# Patient Record
Sex: FEMALE | Race: WHITE | NOT HISPANIC OR LATINO | Employment: OTHER | URBAN - METROPOLITAN AREA
[De-identification: names, ages, dates, MRNs, and addresses within clinical notes are randomized per-mention and may not be internally consistent; named-entity substitution may affect disease eponyms.]

---

## 2017-01-23 ENCOUNTER — HOSPITAL ENCOUNTER (OUTPATIENT)
Dept: FAMILY MEDICINE CLINIC | Facility: CLINIC | Age: 57
Setting detail: SPECIMEN
Discharge: HOME | End: 2017-01-23
Attending: PREVENTIVE MEDICINE | Admitting: PREVENTIVE MEDICINE

## 2017-01-23 LAB
ALBUMIN SERPL-MCNC: 3.9 G/DL (ref 3.5–4.8)
ALBUMIN/GLOB SERPL: 1.2 {RATIO} (ref 1–1.7)
ALP SERPL-CCNC: 109 IU/L (ref 32–91)
ALT SERPL-CCNC: 16 IU/L (ref 14–54)
ANION GAP SERPL CALC-SCNC: 12.4 MMOL/L (ref 10–20)
AST SERPL-CCNC: 21 IU/L (ref 15–41)
BASOPHILS # BLD AUTO: 0.1 10*3/UL (ref 0–0.2)
BASOPHILS NFR BLD AUTO: 1 % (ref 0–2)
BILIRUB SERPL-MCNC: 1 MG/DL (ref 0.3–1.2)
BUN SERPL-MCNC: 8 MG/DL (ref 8–20)
BUN/CREAT SERPL: 10 (ref 5.4–26.2)
CALCIUM SERPL-MCNC: 9 MG/DL (ref 8.9–10.3)
CHLORIDE SERPL-SCNC: 103 MMOL/L (ref 101–111)
CHOLEST SERPL-MCNC: 192 MG/DL
CHOLEST/HDLC SERPL: 4.9 {RATIO}
CONV CO2: 27 MMOL/L (ref 22–32)
CONV LDL CHOLESTEROL DIRECT: 127 MG/DL (ref 0–100)
CONV MICROALBUM.,U,RANDOM: 71 MG/L
CONV TOTAL PROTEIN: 7.1 G/DL (ref 6.1–7.9)
CREAT 24H UR-MCNC: 103.4 MG/DL
CREAT UR-MCNC: 0.8 MG/DL (ref 0.4–1)
CRP SERPL-MCNC: 0.77 MG/DL (ref 0–0.7)
DIFFERENTIAL METHOD BLD: (no result)
EOSINOPHIL # BLD AUTO: 0.2 10*3/UL (ref 0–0.3)
EOSINOPHIL # BLD AUTO: 3 % (ref 0–3)
ERYTHROCYTE [DISTWIDTH] IN BLOOD BY AUTOMATED COUNT: 14.1 % (ref 11.5–14.5)
ERYTHROCYTE [SEDIMENTATION RATE] IN BLOOD BY WESTERGREN METHOD: 17 MM/HR (ref 0–30)
GLOBULIN UR ELPH-MCNC: 3.2 G/DL (ref 2.5–3.8)
GLUCOSE SERPL-MCNC: 122 MG/DL (ref 65–99)
HCT VFR BLD AUTO: 42.8 % (ref 35–49)
HDLC SERPL-MCNC: 39 MG/DL
HGB BLD-MCNC: 14.4 G/DL (ref 12–15)
LDLC/HDLC SERPL: 3.3 {RATIO}
LIPID INTERPRETATION: ABNORMAL
LYMPHOCYTES # BLD AUTO: 1.7 10*3/UL (ref 0.8–4.8)
LYMPHOCYTES NFR BLD AUTO: 23 % (ref 18–42)
MCH RBC QN AUTO: 27.4 PG (ref 26–32)
MCHC RBC AUTO-ENTMCNC: 33.6 G/DL (ref 32–36)
MCV RBC AUTO: 81.4 FL (ref 80–94)
MICROALBUMIN/CREAT UR: 68.7 UG/MG
MONOCYTES # BLD AUTO: 0.5 10*3/UL (ref 0.1–1.3)
MONOCYTES NFR BLD AUTO: 7 % (ref 2–11)
NEUTROPHILS # BLD AUTO: 4.8 10*3/UL (ref 2.3–8.6)
NEUTROPHILS NFR BLD AUTO: 66 % (ref 50–75)
NRBC BLD AUTO-RTO: 0 /100{WBCS}
NRBC/RBC NFR BLD MANUAL: 0 10*3/UL
PLATELET # BLD AUTO: 252 10*3/UL (ref 150–450)
PMV BLD AUTO: 9.5 FL (ref 7.4–10.4)
POTASSIUM SERPL-SCNC: 3.4 MMOL/L (ref 3.6–5.1)
RBC # BLD AUTO: 5.26 10*6/UL (ref 4–5.4)
SODIUM SERPL-SCNC: 139 MMOL/L (ref 136–144)
TRIGL SERPL-MCNC: 179 MG/DL
TSH SERPL-ACNC: 1.34 UIU/ML (ref 0.34–5.6)
VIT B12 SERPL-MCNC: 359 PG/ML (ref 180–914)
VLDLC SERPL CALC-MCNC: 25.9 MG/DL
WBC # BLD AUTO: 7.3 10*3/UL (ref 4.5–11.5)

## 2017-01-25 LAB
HAV IGM SERPL QL IA: NONREACTIVE
HBV CORE IGM SERPL QL IA: NONREACTIVE
HBV SURFACE AG SERPL QL IA: NONREACTIVE
HCV AB SER DONR QL: NORMAL
HCV AB SER DONR QL: NORMAL
HIV1+2 AB SERPL QL IA: NORMAL

## 2017-02-20 ENCOUNTER — HOSPITAL ENCOUNTER (OUTPATIENT)
Dept: PAIN MEDICINE | Facility: HOSPITAL | Age: 57
Discharge: HOME | End: 2017-02-20
Attending: PHYSICAL MEDICINE & REHABILITATION | Admitting: PHYSICAL MEDICINE & REHABILITATION

## 2017-02-20 LAB
AMPHETAMINES UR QL SCN: NEGATIVE
BZE UR QL SCN: NEGATIVE
CREAT 24H UR-MCNC: NORMAL MG/DL
METHADONE UR QL SCN: NEGATIVE
OPIATE CONFIRMATION URINE: NORMAL
THC SERPLBLD CFM-MCNC: NEGATIVE NG/ML

## 2017-03-13 ENCOUNTER — HOSPITAL ENCOUNTER (OUTPATIENT)
Dept: MRI IMAGING | Facility: HOSPITAL | Age: 57
Discharge: HOME | End: 2017-03-13
Attending: PREVENTIVE MEDICINE | Admitting: PREVENTIVE MEDICINE

## 2017-03-20 ENCOUNTER — HOSPITAL ENCOUNTER (OUTPATIENT)
Dept: PAIN MEDICINE | Facility: HOSPITAL | Age: 57
Discharge: HOME | End: 2017-03-20
Attending: PHYSICAL MEDICINE & REHABILITATION | Admitting: PHYSICAL MEDICINE & REHABILITATION

## 2017-04-03 ENCOUNTER — HOSPITAL ENCOUNTER (OUTPATIENT)
Dept: PAIN MEDICINE | Facility: HOSPITAL | Age: 57
Discharge: HOME | End: 2017-04-03
Attending: PHYSICAL MEDICINE & REHABILITATION | Admitting: PHYSICAL MEDICINE & REHABILITATION

## 2017-04-13 ENCOUNTER — HOSPITAL ENCOUNTER (OUTPATIENT)
Dept: ORTHOPEDIC SURGERY | Facility: CLINIC | Age: 57
Discharge: HOME | End: 2017-04-13
Attending: ORTHOPAEDIC SURGERY | Admitting: ORTHOPAEDIC SURGERY

## 2017-04-17 ENCOUNTER — HOSPITAL ENCOUNTER (OUTPATIENT)
Dept: PAIN MEDICINE | Facility: HOSPITAL | Age: 57
Discharge: HOME | End: 2017-04-17
Attending: PHYSICAL MEDICINE & REHABILITATION | Admitting: PHYSICAL MEDICINE & REHABILITATION

## 2017-04-25 ENCOUNTER — HOSPITAL ENCOUNTER (OUTPATIENT)
Dept: FAMILY MEDICINE CLINIC | Facility: CLINIC | Age: 57
Setting detail: SPECIMEN
Discharge: HOME | End: 2017-04-25
Attending: PREVENTIVE MEDICINE | Admitting: PREVENTIVE MEDICINE

## 2017-04-25 LAB
CASTS URNS QL MICRO: ABNORMAL /[LPF]
CONV BACTERIA IN URINE MICRO: NEGATIVE
CONV HYALINE CASTS IN URINE MICRO: 0 /[LPF] (ref 0–5)
CONV MICROALBUM.,U,RANDOM: 15 MG/L
CONV SMALL ROUND CELLS: ABNORMAL /[HPF]
CREAT 24H UR-MCNC: 64.2 MG/DL
MICROALBUMIN/CREAT UR: 23.4 UG/MG
RBC #/AREA URNS HPF: 4 /[HPF] (ref 0–3)
SPERM URNS QL MICRO: ABNORMAL /[HPF]
SQUAMOUS SPT QL MICRO: 2 /[HPF] (ref 0–5)
UNIDENT CRYS URNS QL MICRO: ABNORMAL /[HPF]
WBC #/AREA URNS HPF: 8 /[HPF] (ref 0–5)
YEAST SPEC QL WET PREP: ABNORMAL /[HPF]

## 2017-04-26 LAB
BACTERIA SPEC AEROBE CULT: NORMAL
Lab: NORMAL
MICRO REPORT STATUS: NORMAL
SPECIMEN SOURCE: NORMAL

## 2017-04-28 ENCOUNTER — HOSPITAL ENCOUNTER (OUTPATIENT)
Dept: FAMILY MEDICINE CLINIC | Facility: CLINIC | Age: 57
Setting detail: SPECIMEN
Discharge: HOME | End: 2017-04-28
Attending: PREVENTIVE MEDICINE | Admitting: PREVENTIVE MEDICINE

## 2017-04-28 LAB
ALBUMIN SERPL-MCNC: 3.7 G/DL (ref 3.5–4.8)
ALBUMIN/GLOB SERPL: 1.1 {RATIO} (ref 1–1.7)
ALP SERPL-CCNC: 119 IU/L (ref 32–91)
ALT SERPL-CCNC: 17 IU/L (ref 14–54)
ANION GAP SERPL CALC-SCNC: 13.8 MMOL/L (ref 10–20)
AST SERPL-CCNC: 16 IU/L (ref 15–41)
BILIRUB SERPL-MCNC: 0.9 MG/DL (ref 0.3–1.2)
BUN SERPL-MCNC: 11 MG/DL (ref 8–20)
BUN/CREAT SERPL: 13.8 (ref 5.4–26.2)
CALCIUM SERPL-MCNC: 9.2 MG/DL (ref 8.9–10.3)
CHLORIDE SERPL-SCNC: 103 MMOL/L (ref 101–111)
CHOLEST SERPL-MCNC: 200 MG/DL
CHOLEST/HDLC SERPL: 4.3 {RATIO}
CONV CO2: 28 MMOL/L (ref 22–32)
CONV LDL CHOLESTEROL DIRECT: 135 MG/DL (ref 0–100)
CONV TOTAL PROTEIN: 7.2 G/DL (ref 6.1–7.9)
CREAT UR-MCNC: 0.8 MG/DL (ref 0.4–1)
CRP SERPL-MCNC: 0.84 MG/DL (ref 0–0.7)
ERYTHROCYTE [SEDIMENTATION RATE] IN BLOOD BY WESTERGREN METHOD: 14 MM/HR (ref 0–30)
GLOBULIN UR ELPH-MCNC: 3.5 G/DL (ref 2.5–3.8)
GLUCOSE SERPL-MCNC: 113 MG/DL (ref 65–99)
HDLC SERPL-MCNC: 47 MG/DL
LDLC/HDLC SERPL: 2.9 {RATIO}
LIPID INTERPRETATION: ABNORMAL
POTASSIUM SERPL-SCNC: 3.8 MMOL/L (ref 3.6–5.1)
SODIUM SERPL-SCNC: 141 MMOL/L (ref 136–144)
TRIGL SERPL-MCNC: 108 MG/DL
VIT B12 SERPL-MCNC: 359 PG/ML (ref 180–914)
VLDLC SERPL CALC-MCNC: 17.9 MG/DL

## 2017-04-29 LAB
BACTERIA SPEC AEROBE CULT: NORMAL
Lab: NORMAL
MICRO REPORT STATUS: NORMAL
SPECIMEN SOURCE: NORMAL

## 2017-05-01 ENCOUNTER — HOSPITAL ENCOUNTER (OUTPATIENT)
Dept: PAIN MEDICINE | Facility: HOSPITAL | Age: 57
Discharge: HOME | End: 2017-05-01
Attending: PHYSICAL MEDICINE & REHABILITATION | Admitting: PHYSICAL MEDICINE & REHABILITATION

## 2017-05-15 ENCOUNTER — HOSPITAL ENCOUNTER (OUTPATIENT)
Dept: PAIN MEDICINE | Facility: HOSPITAL | Age: 57
Discharge: HOME | End: 2017-05-15
Attending: PHYSICAL MEDICINE & REHABILITATION | Admitting: PHYSICAL MEDICINE & REHABILITATION

## 2017-06-13 ENCOUNTER — HOSPITAL ENCOUNTER (OUTPATIENT)
Dept: FAMILY MEDICINE CLINIC | Facility: CLINIC | Age: 57
Setting detail: SPECIMEN
Discharge: HOME | End: 2017-06-13
Attending: PREVENTIVE MEDICINE | Admitting: PREVENTIVE MEDICINE

## 2017-07-10 ENCOUNTER — HOSPITAL ENCOUNTER (OUTPATIENT)
Dept: PAIN MEDICINE | Facility: HOSPITAL | Age: 57
Discharge: HOME | End: 2017-07-10
Attending: PHYSICAL MEDICINE & REHABILITATION | Admitting: PHYSICAL MEDICINE & REHABILITATION

## 2017-07-25 ENCOUNTER — ON CAMPUS - OUTPATIENT (AMBULATORY)
Dept: URBAN - METROPOLITAN AREA HOSPITAL 2 | Facility: HOSPITAL | Age: 57
End: 2017-07-25

## 2017-07-25 ENCOUNTER — HOSPITAL ENCOUNTER (OUTPATIENT)
Dept: OTHER | Facility: HOSPITAL | Age: 57
Setting detail: SPECIMEN
Discharge: HOME | End: 2017-07-25
Attending: INTERNAL MEDICINE | Admitting: INTERNAL MEDICINE

## 2017-07-25 ENCOUNTER — OFFICE (AMBULATORY)
Dept: URBAN - METROPOLITAN AREA CLINIC 64 | Facility: CLINIC | Age: 57
End: 2017-07-25

## 2017-07-25 VITALS
HEART RATE: 72 BPM | WEIGHT: 250 LBS | SYSTOLIC BLOOD PRESSURE: 101 MMHG | SYSTOLIC BLOOD PRESSURE: 111 MMHG | TEMPERATURE: 98.4 F | DIASTOLIC BLOOD PRESSURE: 69 MMHG | SYSTOLIC BLOOD PRESSURE: 114 MMHG | DIASTOLIC BLOOD PRESSURE: 58 MMHG | DIASTOLIC BLOOD PRESSURE: 55 MMHG | OXYGEN SATURATION: 92 % | SYSTOLIC BLOOD PRESSURE: 107 MMHG | HEART RATE: 66 BPM | DIASTOLIC BLOOD PRESSURE: 76 MMHG | HEART RATE: 68 BPM | SYSTOLIC BLOOD PRESSURE: 88 MMHG | HEART RATE: 90 BPM | HEIGHT: 66 IN | OXYGEN SATURATION: 94 % | DIASTOLIC BLOOD PRESSURE: 92 MMHG | DIASTOLIC BLOOD PRESSURE: 89 MMHG | RESPIRATION RATE: 18 BRPM | SYSTOLIC BLOOD PRESSURE: 207 MMHG | DIASTOLIC BLOOD PRESSURE: 75 MMHG | DIASTOLIC BLOOD PRESSURE: 61 MMHG | SYSTOLIC BLOOD PRESSURE: 158 MMHG | OXYGEN SATURATION: 95 % | OXYGEN SATURATION: 96 % | HEART RATE: 61 BPM | OXYGEN SATURATION: 93 % | SYSTOLIC BLOOD PRESSURE: 122 MMHG | DIASTOLIC BLOOD PRESSURE: 97 MMHG | OXYGEN SATURATION: 91 % | HEART RATE: 71 BPM | HEART RATE: 58 BPM | HEART RATE: 64 BPM | HEART RATE: 63 BPM | SYSTOLIC BLOOD PRESSURE: 146 MMHG | RESPIRATION RATE: 16 BRPM

## 2017-07-25 DIAGNOSIS — K57.30 DIVERTICULOSIS OF LARGE INTESTINE WITHOUT PERFORATION OR ABS: ICD-10-CM

## 2017-07-25 DIAGNOSIS — K62.1 RECTAL POLYP: ICD-10-CM

## 2017-07-25 DIAGNOSIS — D12.5 BENIGN NEOPLASM OF SIGMOID COLON: ICD-10-CM

## 2017-07-25 DIAGNOSIS — Z86.010 PERSONAL HISTORY OF COLONIC POLYPS: ICD-10-CM

## 2017-07-25 DIAGNOSIS — D12.3 BENIGN NEOPLASM OF TRANSVERSE COLON: ICD-10-CM

## 2017-07-25 DIAGNOSIS — K63.5 POLYP OF COLON: ICD-10-CM

## 2017-07-25 PROBLEM — Z80.0 FAMILY HISTORY OF COLON CANCER: Status: ACTIVE | Noted: 2017-07-25

## 2017-07-25 PROBLEM — Z12.11 SCREENING FOR MALIGNANT NEOPLASMS OF COLON: Status: ACTIVE | Noted: 2017-07-25

## 2017-07-25 LAB
GI HISTOLOGY: A. UNSPECIFIED: (no result)
GI HISTOLOGY: B. UNSPECIFIED: (no result)
GI HISTOLOGY: C. UNSPECIFIED: (no result)
GI HISTOLOGY: PDF REPORT: (no result)

## 2017-07-25 PROCEDURE — 45385 COLONOSCOPY W/LESION REMOVAL: CPT | Mod: PT | Performed by: INTERNAL MEDICINE

## 2017-07-25 PROCEDURE — 88305 TISSUE EXAM BY PATHOLOGIST: CPT | Mod: 26 | Performed by: INTERNAL MEDICINE

## 2017-07-25 RX ADMIN — PROPOFOL: 10 INJECTION, EMULSION INTRAVENOUS at 10:32

## 2017-09-05 ENCOUNTER — HOSPITAL ENCOUNTER (OUTPATIENT)
Dept: PAIN MEDICINE | Facility: HOSPITAL | Age: 57
Discharge: HOME | End: 2017-09-05
Attending: PHYSICAL MEDICINE & REHABILITATION | Admitting: PHYSICAL MEDICINE & REHABILITATION

## 2017-10-31 ENCOUNTER — HOSPITAL ENCOUNTER (OUTPATIENT)
Dept: PAIN MEDICINE | Facility: HOSPITAL | Age: 57
Discharge: HOME | End: 2017-10-31
Attending: PHYSICAL MEDICINE & REHABILITATION | Admitting: PHYSICAL MEDICINE & REHABILITATION

## 2017-11-16 ENCOUNTER — HOSPITAL ENCOUNTER (OUTPATIENT)
Dept: PAIN MEDICINE | Facility: HOSPITAL | Age: 57
Discharge: HOME | End: 2017-11-16
Attending: PHYSICAL MEDICINE & REHABILITATION | Admitting: PHYSICAL MEDICINE & REHABILITATION

## 2017-11-28 ENCOUNTER — HOSPITAL ENCOUNTER (OUTPATIENT)
Dept: PAIN MEDICINE | Facility: HOSPITAL | Age: 57
Discharge: HOME | End: 2017-11-28
Attending: PHYSICAL MEDICINE & REHABILITATION | Admitting: PHYSICAL MEDICINE & REHABILITATION

## 2017-12-19 ENCOUNTER — HOSPITAL ENCOUNTER (OUTPATIENT)
Dept: PAIN MEDICINE | Facility: HOSPITAL | Age: 57
Discharge: HOME | End: 2017-12-19
Attending: PHYSICAL MEDICINE & REHABILITATION | Admitting: PHYSICAL MEDICINE & REHABILITATION

## 2018-02-06 ENCOUNTER — HOSPITAL ENCOUNTER (OUTPATIENT)
Dept: PAIN MEDICINE | Facility: HOSPITAL | Age: 58
Discharge: HOME | End: 2018-02-06
Attending: PHYSICAL MEDICINE & REHABILITATION | Admitting: PHYSICAL MEDICINE & REHABILITATION

## 2018-02-08 ENCOUNTER — HOSPITAL ENCOUNTER (OUTPATIENT)
Dept: FAMILY MEDICINE CLINIC | Facility: CLINIC | Age: 58
Setting detail: SPECIMEN
Discharge: HOME | End: 2018-02-08
Attending: PREVENTIVE MEDICINE | Admitting: PREVENTIVE MEDICINE

## 2018-02-08 LAB
ALBUMIN SERPL-MCNC: 3.9 G/DL (ref 3.5–4.8)
ALBUMIN/GLOB SERPL: 1.1 {RATIO} (ref 1–1.7)
ALP SERPL-CCNC: 102 IU/L (ref 32–91)
ALT SERPL-CCNC: 14 IU/L (ref 14–54)
ANION GAP SERPL CALC-SCNC: 11.2 MMOL/L (ref 10–20)
AST SERPL-CCNC: 16 IU/L (ref 15–41)
BASOPHILS # BLD AUTO: 0 10*3/UL (ref 0–0.2)
BASOPHILS NFR BLD AUTO: 1 % (ref 0–2)
BILIRUB SERPL-MCNC: 1 MG/DL (ref 0.3–1.2)
BUN SERPL-MCNC: 8 MG/DL (ref 8–20)
BUN/CREAT SERPL: 11.4 (ref 5.4–26.2)
CALCIUM SERPL-MCNC: 9.3 MG/DL (ref 8.9–10.3)
CHLORIDE SERPL-SCNC: 104 MMOL/L (ref 101–111)
CHOLEST SERPL-MCNC: 192 MG/DL
CHOLEST/HDLC SERPL: 5.1 {RATIO}
CONV CO2: 27 MMOL/L (ref 22–32)
CONV LDL CHOLESTEROL DIRECT: 129 MG/DL (ref 0–100)
CONV MICROALBUM.,U,RANDOM: 22 MG/L
CONV TOTAL PROTEIN: 7.3 G/DL (ref 6.1–7.9)
CREAT 24H UR-MCNC: 126.9 MG/DL
CREAT UR-MCNC: 0.7 MG/DL (ref 0.4–1)
DIFFERENTIAL METHOD BLD: (no result)
EOSINOPHIL # BLD AUTO: 0.2 10*3/UL (ref 0–0.3)
EOSINOPHIL # BLD AUTO: 2 % (ref 0–3)
ERYTHROCYTE [DISTWIDTH] IN BLOOD BY AUTOMATED COUNT: 14.2 % (ref 11.5–14.5)
GLOBULIN UR ELPH-MCNC: 3.4 G/DL (ref 2.5–3.8)
GLUCOSE SERPL-MCNC: 106 MG/DL (ref 65–99)
HCT VFR BLD AUTO: 42.5 % (ref 35–49)
HDLC SERPL-MCNC: 38 MG/DL
HGB BLD-MCNC: 14.5 G/DL (ref 12–15)
LDLC/HDLC SERPL: 3.4 {RATIO}
LIPID INTERPRETATION: ABNORMAL
LYMPHOCYTES # BLD AUTO: 1.8 10*3/UL (ref 0.8–4.8)
LYMPHOCYTES NFR BLD AUTO: 19 % (ref 18–42)
MCH RBC QN AUTO: 28.7 PG (ref 26–32)
MCHC RBC AUTO-ENTMCNC: 34 G/DL (ref 32–36)
MCV RBC AUTO: 84.5 FL (ref 80–94)
MICROALBUMIN/CREAT UR: 17.3 UG/MG
MONOCYTES # BLD AUTO: 0.6 10*3/UL (ref 0.1–1.3)
MONOCYTES NFR BLD AUTO: 6 % (ref 2–11)
NEUTROPHILS # BLD AUTO: 6.9 10*3/UL (ref 2.3–8.6)
NEUTROPHILS NFR BLD AUTO: 72 % (ref 50–75)
NRBC BLD AUTO-RTO: 0 /100{WBCS}
NRBC/RBC NFR BLD MANUAL: 0 10*3/UL
PLATELET # BLD AUTO: 285 10*3/UL (ref 150–450)
PMV BLD AUTO: 9.6 FL (ref 7.4–10.4)
POTASSIUM SERPL-SCNC: 4.2 MMOL/L (ref 3.6–5.1)
RBC # BLD AUTO: 5.03 10*6/UL (ref 4–5.4)
SODIUM SERPL-SCNC: 138 MMOL/L (ref 136–144)
TRIGL SERPL-MCNC: 139 MG/DL
VLDLC SERPL CALC-MCNC: 25.1 MG/DL
WBC # BLD AUTO: 9.4 10*3/UL (ref 4.5–11.5)

## 2018-03-13 ENCOUNTER — HOSPITAL ENCOUNTER (OUTPATIENT)
Dept: FAMILY MEDICINE CLINIC | Facility: CLINIC | Age: 58
Setting detail: SPECIMEN
Discharge: HOME | End: 2018-03-13
Attending: PREVENTIVE MEDICINE | Admitting: PREVENTIVE MEDICINE

## 2018-04-24 ENCOUNTER — HOSPITAL ENCOUNTER (OUTPATIENT)
Dept: PAIN MEDICINE | Facility: HOSPITAL | Age: 58
Discharge: HOME | End: 2018-04-24
Attending: PHYSICAL MEDICINE & REHABILITATION | Admitting: PHYSICAL MEDICINE & REHABILITATION

## 2018-05-08 ENCOUNTER — HOSPITAL ENCOUNTER (OUTPATIENT)
Dept: ORTHOPEDIC SURGERY | Facility: CLINIC | Age: 58
Discharge: HOME | End: 2018-05-08
Attending: PHYSICIAN ASSISTANT | Admitting: PHYSICIAN ASSISTANT

## 2022-03-17 ENCOUNTER — OFFICE VISIT (OUTPATIENT)
Dept: ORTHOPEDIC SURGERY | Facility: CLINIC | Age: 62
End: 2022-03-17

## 2022-03-17 VITALS
WEIGHT: 253.4 LBS | BODY MASS INDEX: 40.72 KG/M2 | SYSTOLIC BLOOD PRESSURE: 138 MMHG | HEART RATE: 98 BPM | DIASTOLIC BLOOD PRESSURE: 82 MMHG | HEIGHT: 66 IN

## 2022-03-17 DIAGNOSIS — Z96.652 HISTORY OF TOTAL KNEE REPLACEMENT, LEFT: Primary | ICD-10-CM

## 2022-03-17 DIAGNOSIS — E66.01 MORBID OBESITY: ICD-10-CM

## 2022-03-17 PROBLEM — E11.65 TYPE 2 DIABETES MELLITUS WITH HYPERGLYCEMIA (HCC): Status: ACTIVE | Noted: 2017-01-23

## 2022-03-17 PROBLEM — Z96.651 PRESENCE OF RIGHT ARTIFICIAL KNEE JOINT: Status: ACTIVE | Noted: 2018-04-19

## 2022-03-17 PROBLEM — M79.7 FIBROMYALGIA: Status: ACTIVE | Noted: 2017-01-23

## 2022-03-17 PROBLEM — G89.29 CHRONIC LOW BACK PAIN: Status: ACTIVE | Noted: 2017-01-23

## 2022-03-17 PROBLEM — E55.9 VITAMIN D DEFICIENCY: Status: ACTIVE | Noted: 2017-01-23

## 2022-03-17 PROBLEM — R74.8 HIGH ALKALINE PHOSPHATASE: Status: ACTIVE | Noted: 2017-01-23

## 2022-03-17 PROBLEM — F41.1 ANXIETY STATE: Status: ACTIVE | Noted: 2017-01-23

## 2022-03-17 PROBLEM — E87.6 HYPOKALEMIA: Status: ACTIVE | Noted: 2017-01-23

## 2022-03-17 PROBLEM — M47.816 LUMBAR SPONDYLOSIS: Status: ACTIVE | Noted: 2020-02-26

## 2022-03-17 PROBLEM — M54.16 LUMBAR RADICULOPATHY: Status: ACTIVE | Noted: 2017-02-20

## 2022-03-17 PROBLEM — M51.36 DEGENERATION OF INTERVERTEBRAL DISC OF LUMBAR REGION: Status: ACTIVE | Noted: 2017-02-20

## 2022-03-17 PROBLEM — E66.9 OBESITY WITH BODY MASS INDEX 30 OR GREATER: Status: ACTIVE | Noted: 2017-01-23

## 2022-03-17 PROBLEM — A49.02 MRSA INFECTION: Status: ACTIVE | Noted: 2017-01-23

## 2022-03-17 PROBLEM — Z80.0 FAMILY HISTORY OF MALIGNANT NEOPLASM OF COLON: Status: ACTIVE | Noted: 2017-01-23

## 2022-03-17 PROBLEM — I10 HYPERTENSION: Status: ACTIVE | Noted: 2017-04-28

## 2022-03-17 PROBLEM — K13.21 LEUKOPLAKIA OF ORAL MUCOSA: Status: ACTIVE | Noted: 2017-01-23

## 2022-03-17 PROBLEM — M51.369 DEGENERATION OF INTERVERTEBRAL DISC OF LUMBAR REGION: Status: ACTIVE | Noted: 2017-02-20

## 2022-03-17 PROBLEM — H72.90 TYMPANIC MEMBRANE PERFORATION: Status: ACTIVE | Noted: 2018-03-13

## 2022-03-17 PROBLEM — M54.50 CHRONIC LOW BACK PAIN: Status: ACTIVE | Noted: 2017-01-23

## 2022-03-17 PROCEDURE — 99204 OFFICE O/P NEW MOD 45 MIN: CPT | Performed by: PHYSICIAN ASSISTANT

## 2022-03-17 RX ORDER — CYCLOSPORINE 0.5 MG/ML
1 EMULSION OPHTHALMIC EVERY 12 HOURS
COMMUNITY

## 2022-03-17 RX ORDER — SACCHAROMYCES BOULARDII 250 MG
250 CAPSULE ORAL 2 TIMES DAILY
COMMUNITY
End: 2022-05-09

## 2022-03-17 RX ORDER — DIPHENHYDRAMINE HYDROCHLORIDE 25 MG/1
CAPSULE, LIQUID FILLED ORAL
Status: ON HOLD | COMMUNITY
Start: 2017-01-23 | End: 2022-05-24

## 2022-03-17 RX ORDER — LOSARTAN POTASSIUM 100 MG/1
100 TABLET ORAL
COMMUNITY
Start: 2017-10-26

## 2022-03-17 RX ORDER — ATORVASTATIN CALCIUM 20 MG/1
20 TABLET, FILM COATED ORAL NIGHTLY
COMMUNITY
Start: 2022-02-15

## 2022-03-17 RX ORDER — GABAPENTIN 600 MG/1
600 TABLET ORAL 3 TIMES DAILY
COMMUNITY
Start: 2022-02-15

## 2022-03-17 RX ORDER — BUPROPION HYDROCHLORIDE 150 MG/1
1 TABLET ORAL EVERY MORNING
COMMUNITY
Start: 2022-02-15

## 2022-03-17 RX ORDER — MELOXICAM 15 MG/1
15 TABLET ORAL DAILY
Qty: 30 TABLET | Refills: 0 | Status: SHIPPED | OUTPATIENT
Start: 2022-03-17 | End: 2022-04-13

## 2022-03-17 RX ORDER — DAPAGLIFLOZIN AND METFORMIN HYDROCHLORIDE 5; 1000 MG/1; MG/1
TABLET, FILM COATED, EXTENDED RELEASE ORAL
COMMUNITY
End: 2022-05-09

## 2022-03-17 RX ORDER — ALBUTEROL SULFATE 90 UG/1
2 AEROSOL, METERED RESPIRATORY (INHALATION) EVERY 6 HOURS PRN
COMMUNITY

## 2022-03-17 RX ORDER — FAMOTIDINE 20 MG/1
TABLET, FILM COATED ORAL
COMMUNITY
End: 2022-05-09

## 2022-03-17 RX ORDER — FLUOXETINE HYDROCHLORIDE 60 MG/1
TABLET, FILM COATED ORAL; ORAL EVERY 24 HOURS
Status: ON HOLD | COMMUNITY
Start: 2017-11-13 | End: 2022-05-24

## 2022-03-17 RX ORDER — MULTIPLE VITAMINS W/ MINERALS TAB 9MG-400MCG
1 TAB ORAL DAILY
COMMUNITY

## 2022-03-17 RX ORDER — FLUTICASONE PROPIONATE 50 MCG
2 SPRAY, SUSPENSION (ML) NASAL DAILY
COMMUNITY

## 2022-03-17 RX ORDER — VILAZODONE HYDROCHLORIDE 40 MG/1
40 TABLET ORAL
COMMUNITY
Start: 2022-02-15

## 2022-03-17 RX ORDER — CHOLECALCIFEROL (VITAMIN D3) 25 MCG
1000 TABLET,CHEWABLE ORAL DAILY
COMMUNITY
Start: 2017-04-25

## 2022-03-17 RX ORDER — DULAGLUTIDE 1.5 MG/.5ML
1.5 INJECTION, SOLUTION SUBCUTANEOUS WEEKLY
COMMUNITY

## 2022-03-17 RX ORDER — HYDROXYZINE HYDROCHLORIDE 25 MG/1
25 TABLET, FILM COATED ORAL EVERY 8 HOURS PRN
COMMUNITY

## 2022-03-17 NOTE — PROGRESS NOTES
ORTHOPEDIC VISIT    Referring Provider: Referring  Primary Care Provider: Chaya Dorsey MD         Subjective:  Chief Complaint:  Chief Complaint   Patient presents with   • Left Knee - Initial Evaluation     S/p Lt TKA 2014 in [place], increased issues per patient. Was walking x 1-2 months ago felt like knee went out, feels a lot of pressure.       HPI:  Monica Fitzpatrick is a 61 y.o. female who presents for her initial visit for left knee pain ongoing for the last 2 months.  She denies any specific injury.  She had a left total knee replacement in 2014 in [place].  She describes a sharp, shooting pain in the general knee area.  She also reports radiation up and down the leg, but denies any numbness or tingling.  Her pain does increase significantly with weightbearing, and she also reports instability.  She has not been able to try anti-inflammatories due to stomach issues.  Taping by physical therapy has helped a little.  She denies any significant issues with the knee until now.  She denies any history of infections in the knee.    Past Medical History:   Diagnosis Date   • Anxiety and depression    • Diabetes (HCC)    • Dry eye    • GERD (gastroesophageal reflux disease)    • Hyperlipidemia    • Hypertension        Past Surgical History:   Procedure Laterality Date   • REPLACEMENT TOTAL KNEE Left 2014    [place]   • TOTAL KNEE ARTHROPLASTY Right    • WRIST SURGERY Bilateral     plate and screws       Family History   Problem Relation Age of Onset   • Diabetes Mother    • Diabetes Father    • Alcohol abuse Father    • Drug abuse Sister    • Alcohol abuse Brother    • Diabetes Maternal Grandmother        Social History     Occupational History   • Not on file   Tobacco Use   • Smoking status: Never Smoker   • Smokeless tobacco: Never Used   Vaping Use   • Vaping Use: Never used   Substance and Sexual Activity   • Alcohol use: Never   • Drug use: Never   • Sexual activity: Defer         Medications:    Current Outpatient Medications:   •  albuterol sulfate  (90 Base) MCG/ACT inhaler, Ventolin HFA 90 mcg/actuation aerosol inhaler  INHALE 2 PUFFS BY MOUTH FOUR TIMES DAILY, Disp: , Rfl:   •  atorvastatin (LIPITOR) 20 MG tablet, Take 20 mg by mouth Daily., Disp: , Rfl:   •  Blood Glucose Monitoring Suppl (Blood Glucose Monitor System) w/Device kit, BLOOD GLUCOSE MONITOR SYSTEM w/Device KIT, Disp: , Rfl:   •  buPROPion XL (WELLBUTRIN XL) 150 MG 24 hr tablet, Take 1 tablet by mouth Daily., Disp: , Rfl:   •  Cyanocobalamin (B-12) 1000 MCG capsule, B-12 1000 MCG CAPS, Disp: , Rfl:   •  cycloSPORINE (RESTASIS) 0.05 % ophthalmic emulsion, Restasis 0.05 % eye drops in a dropperette, Disp: , Rfl:   •  dapagliflozin-metformin HCl ER (Xigduo XR) 5-1000 MG tablet, Xigduo XR 5 mg-1,000 mg tablet,extended release  TAKE 1 TABLET BY MOUTH EVERY DAY, Disp: , Rfl:   •  Dulaglutide (Trulicity) 1.5 MG/0.5ML solution pen-injector, Trulicity 1.5 mg/0.5 mL subcutaneous pen injector  Inject by subcutaneous route., Disp: , Rfl:   •  famotidine (PEPCID) 20 MG tablet, famotidine 20 mg tablet, Disp: , Rfl:   •  FLUoxetine (PROzac) 60 MG tablet, Daily., Disp: , Rfl:   •  fluticasone (FLONASE) 50 MCG/ACT nasal spray, fluticasone propionate 50 mcg/actuation nasal spray,suspension  INSTILL 2 SPRAYS INTO EACH NOSTRIL EVERY DAY, Disp: , Rfl:   •  gabapentin (NEURONTIN) 600 MG tablet, Take 600 mg by mouth 3 (Three) Times a Day., Disp: , Rfl:   •  hydrocortisone 2.5 % cream, hydrocortisone 2.5 % topical cream with perineal applicator, Disp: , Rfl:   •  hydrOXYzine (ATARAX) 25 MG tablet, hydroxyzine HCl 25 mg tablet  TAKE ONE (1) TABLET BY MOUTH EVERY 6 HOURS AS NEEDED FOR ANXIETY, Disp: , Rfl:   •  losartan (COZAAR) 100 MG tablet, losartan 100 mg tablet  TAKE 1 TABLET BY MOUTH EVERY DAY, Disp: , Rfl:   •  multivitamin with minerals tablet tablet, Take 1 tablet by mouth Daily., Disp: , Rfl:   •  saccharomyces boulardii  "(FLORASTOR) 250 MG capsule, Take 250 mg by mouth 2 (Two) Times a Day., Disp: , Rfl:   •  Viibryd 40 MG tablet tablet, Take 40 mg by mouth Daily., Disp: , Rfl:   •  vitamin D3 125 MCG (5000 UT) capsule capsule, Take 5,000 Units by mouth Daily., Disp: , Rfl:   •  meloxicam (MOBIC) 15 MG tablet, Take 1 tablet by mouth Daily. Prn joint pain, Disp: 30 tablet, Rfl: 0    Allergies:  Allergies   Allergen Reactions   • Sulfa Antibiotics Itching, Nausea And Vomiting, Nausea Only and Rash   • Tramadol Itching, Nausea Only, Rash and GI Intolerance         Review of Systems:  Gen -no fever, chills , sweats, headache   Eyes - no irritation or discharge   ENT -  no ear pain , runny nose , sore throat , difficulty swallowing   Resp - no cough , congestion , excessive expectoration   CVS - no chest pain , palpitations.   Abd - no pain , nausea , vomiting , diarrhea   Skin - no rash , lesions.   Neuro - no dizziness    Please see HPI for any other pertinent positives.  All other systems were reviewed and are negative.       Objective   Objective:    /82 (BP Location: Left arm, Patient Position: Sitting, Cuff Size: Large Adult)   Pulse 98   Ht 167.6 cm (66\")   Wt 115 kg (253 lb 6.4 oz)   BMI 40.90 kg/m²     Physical Examination:  Alert, oriented, morbidly obese individual in no acute distress, ambulating with the assistance of a walker  Left lower extremity shows a well-healed surgical incision with no erythema, drainage, or open skin lesions. There is a mild amount of swelling. It is grossly well aligned, and the patient is neurovascularly intact distally. The knee is stable to varus and valgus stress, there is no patellar maltracking or crepitus noted, and plantar and dorsiflexion is 5/5. There is mild tenderness to palpation over the patella tendon and medial joint line and with range of motion, which is about 0-115.  No overt instability appreciated.           Imaging:  xrays obtained today  left Knee X-Ray    Date of " "exam: 3/17/2022    Indication: Left knee pain status post left total knee replacement    AP, Lateral, [place] views    Findings:A well positioned knee replacement without evidence of bony or implant failure. and No fractures or dislocations are appreciated    within normal limits joint spaces    Hardware appropriately positioned yes    no prior studies available for comparison.    This patient's x-ray report was graded according to the Kellgren and Fam classification.  This took into account the joint space narrowing, osteophyte formation, sclerosis of the distal femur/proximal tibia along with deformity of those bones.  The findings were indicative of K L grade na.    X-RAY was ordered and reviewed by GRICELDA Luciano          Assessment:  1. History of total knee replacement, left    2. Morbid obesity (HCC)                 Plan:  Since patient is having significant pain and trouble bearing weight, we will proceed with a bone scan as well as some blood work to include a CBC, sed rate, and CRP.  Weight loss is highly recommended.  I will try her on meloxicam for her pain.  She will be fitted for hinged knee brace today to help with stability and fall prevention.  She should continue using her walker.  We will plan to see her back after the above has been completed, further recommendations will be pending.             GRICELDA Luciano  03/17/22  09:43 EDT    \"Please note that portions of this note were completed with a voice recognition program\".   "

## 2022-03-17 NOTE — PATIENT INSTRUCTIONS
Preventing Health Risks of Being Overweight  Maintaining a healthy body weight is an important part of your overall health. Your healthy body weight depends on your age, gender, and height. Being overweight puts you at risk for many health problems, including:  Heart disease.  Diabetes.  Problems sleeping.  Joint problems.  You can make changes to your diet and lifestyle to prevent these risks. Consider working with a health care provider or a dietitian to make these changes.  What nutrition changes can be made?    Eat only as much as your body needs. In most cases, this is about 2,000 calories a day, but the amount varies depending on your height, gender, and activity level. Ask your health care provider how many calories you should have each day. Eating more than your body needs on a regular basis can cause you to become overweight or obese.  Eat slowly, and stop eating when you feel full.  Choose healthy foods, including:  Fruits and vegetables.  Lean meats.  Low-fat dairy products.  High-fiber foods, such as whole grains and beans.  Healthy snacks like vegetable sticks, a piece of fruit, or a small amount of yogurt or cheese.  Avoid foods and drinks that are high in sugar, salt (sodium), saturated fat, or trans fat. This includes:  Many desserts such as candy, cookies, and ice cream.  Soda.  Fried foods.  Processed meats such as hot dogs or lunch meats.  Prepackaged snack foods.  What lifestyle changes can be made?    Exercise for at least 150 minutes a week to prevent weight gain, or as often as recommended by your health care provider. Do moderate-intensity exercise, such as brisk walking.  Spread it out by exercising for 30 minutes 5 days a week, or in short 10-minute bursts several times a day.  Find other ways to stay active and burn calories, such as yard work or a hobby that involves physical activity.  Get at least 8 hours of sleep each night. When you are well-rested, you are more likely to be active  and make healthy choices during the day. To sleep better:  Try to go to bed and wake up at about the same time every day.  Keep your bedroom dark, quiet, and cool.  Make sure that your bed is comfortable.  Avoid stimulating activities, such as watching television or exercising, for at least one hour before bedtime.  Why are these changes important?  Eating healthy and being active helps you lose weight and prevent health problems caused by being overweight. Making these changes can also help you manage stress, feel better mentally, and connect with friends and family.  What can happen if changes are not made?  Being overweight can affect you for your entire life. You may develop joint or bone problems that make it painful or difficult for you to play sports or do activities you enjoy. Being overweight puts stress on your heart and lungs and can lead to medical problems like diabetes, heart disease, and sleeping problems.  Where to find support  You can get support for preventing health risks of being overweight from:  Your health care provider or a dietitian. They can provide guidance about healthy eating and healthy lifestyle choices.  Weight loss support groups, online or in-person.  Where to find more information  MyPlate: www.choosemyplate.gov  This an online tool that provides personalized recommendations about foods to eat each day.  The Centers for Disease Control and Prevention: www.cdc.gov/healthyweight  This resource gives tips for managing weight and having an active lifestyle.  Summary  To prevent unhealthy weight gain, it is important to maintain a healthy diet high in vegetables and whole grains, exercise regularly, and get at least 8 hours of sleep each night.  Making these changes helps prevent many long-term (chronic) health conditions that can shorten your life, such as diabetes, heart disease, and stroke.  This information is not intended to replace advice given to you by your health care  provider. Make sure you discuss any questions you have with your health care provider.  Document Revised: 04/15/2021 Document Reviewed: 04/15/2021  Elsevier Patient Education © 2021 [employer]

## 2022-03-18 ENCOUNTER — PATIENT ROUNDING (BHMG ONLY) (OUTPATIENT)
Dept: ORTHOPEDIC SURGERY | Facility: CLINIC | Age: 62
End: 2022-03-18

## 2022-03-18 NOTE — PROGRESS NOTES
A My-Chart message has been sent to the patient for PATIENT ROUNDING with [facility]

## 2022-03-28 ENCOUNTER — HOSPITAL ENCOUNTER (OUTPATIENT)
Dept: NUCLEAR MEDICINE | Facility: HOSPITAL | Age: 62
Discharge: HOME | End: 2022-03-28

## 2022-03-28 DIAGNOSIS — Z96.652 HISTORY OF TOTAL KNEE REPLACEMENT, LEFT: ICD-10-CM

## 2022-03-28 PROCEDURE — A9503 TC99M MEDRONATE: HCPCS | Performed by: PHYSICIAN ASSISTANT

## 2022-03-28 PROCEDURE — 78315 BONE IMAGING 3 PHASE: CPT

## 2022-03-28 PROCEDURE — 0 TECHNETIUM MEDRONATE KIT: Performed by: PHYSICIAN ASSISTANT

## 2022-03-28 RX ORDER — TC 99M MEDRONATE 20 MG/10ML
27 INJECTION, POWDER, LYOPHILIZED, FOR SOLUTION INTRAVENOUS
Status: COMPLETED | OUTPATIENT
Start: 2022-03-28 | End: 2022-03-28

## 2022-03-28 RX ADMIN — TC 99M MEDRONATE 27 MILLICURIE: 20 INJECTION, POWDER, LYOPHILIZED, FOR SOLUTION INTRAVENOUS at 10:14

## 2022-03-31 ENCOUNTER — LAB (OUTPATIENT)
Dept: LAB | Facility: HOSPITAL | Age: 62
End: 2022-03-31

## 2022-03-31 DIAGNOSIS — Z96.652 HISTORY OF TOTAL KNEE REPLACEMENT, LEFT: ICD-10-CM

## 2022-03-31 LAB
BASOPHILS # BLD AUTO: 0.06 10*3/MM3 (ref 0–0.2)
BASOPHILS NFR BLD AUTO: 0.9 % (ref 0–1.5)
CRP SERPL-MCNC: 0.47 MG/DL (ref 0–0.5)
DEPRECATED RDW RBC AUTO: 40.8 FL (ref 37–54)
EOSINOPHIL # BLD AUTO: 0.16 10*3/MM3 (ref 0–0.4)
EOSINOPHIL NFR BLD AUTO: 2.5 % (ref 0.3–6.2)
ERYTHROCYTE [DISTWIDTH] IN BLOOD BY AUTOMATED COUNT: 13.5 % (ref 12.3–15.4)
ERYTHROCYTE [SEDIMENTATION RATE] IN BLOOD: 10 MM/HR (ref 0–30)
HCT VFR BLD AUTO: 42.7 % (ref 34–46.6)
HGB BLD-MCNC: 13.9 G/DL (ref 12–15.9)
IMM GRANULOCYTES # BLD AUTO: 0.02 10*3/MM3 (ref 0–0.05)
IMM GRANULOCYTES NFR BLD AUTO: 0.3 % (ref 0–0.5)
LYMPHOCYTES # BLD AUTO: 1.62 10*3/MM3 (ref 0.7–3.1)
LYMPHOCYTES NFR BLD AUTO: 25.1 % (ref 19.6–45.3)
MCH RBC QN AUTO: 27.2 PG (ref 26.6–33)
MCHC RBC AUTO-ENTMCNC: 32.6 G/DL (ref 31.5–35.7)
MCV RBC AUTO: 83.6 FL (ref 79–97)
MONOCYTES # BLD AUTO: 0.48 10*3/MM3 (ref 0.1–0.9)
MONOCYTES NFR BLD AUTO: 7.4 % (ref 5–12)
NEUTROPHILS NFR BLD AUTO: 4.11 10*3/MM3 (ref 1.7–7)
NEUTROPHILS NFR BLD AUTO: 63.8 % (ref 42.7–76)
NRBC BLD AUTO-RTO: 0 /100 WBC (ref 0–0.2)
PLATELET # BLD AUTO: 249 10*3/MM3 (ref 140–450)
PMV BLD AUTO: 11.1 FL (ref 6–12)
RBC # BLD AUTO: 5.11 10*6/MM3 (ref 3.77–5.28)
WBC NRBC COR # BLD: 6.45 10*3/MM3 (ref 3.4–10.8)

## 2022-03-31 PROCEDURE — 85025 COMPLETE CBC W/AUTO DIFF WBC: CPT

## 2022-03-31 PROCEDURE — 36415 COLL VENOUS BLD VENIPUNCTURE: CPT

## 2022-03-31 PROCEDURE — 86140 C-REACTIVE PROTEIN: CPT

## 2022-03-31 PROCEDURE — 85652 RBC SED RATE AUTOMATED: CPT

## 2022-04-12 DIAGNOSIS — Z96.652 HISTORY OF TOTAL KNEE REPLACEMENT, LEFT: ICD-10-CM

## 2022-04-13 ENCOUNTER — OFFICE VISIT (OUTPATIENT)
Dept: ORTHOPEDIC SURGERY | Facility: CLINIC | Age: 62
End: 2022-04-13

## 2022-04-13 VITALS
HEIGHT: 66 IN | HEART RATE: 63 BPM | WEIGHT: 254 LBS | OXYGEN SATURATION: 97 % | DIASTOLIC BLOOD PRESSURE: 86 MMHG | SYSTOLIC BLOOD PRESSURE: 150 MMHG | BODY MASS INDEX: 40.82 KG/M2

## 2022-04-13 DIAGNOSIS — Z96.651 PRESENCE OF RIGHT ARTIFICIAL KNEE JOINT: Primary | ICD-10-CM

## 2022-04-13 DIAGNOSIS — Z96.652 HISTORY OF TOTAL KNEE REPLACEMENT, LEFT: ICD-10-CM

## 2022-04-13 PROCEDURE — 99215 OFFICE O/P EST HI 40 MIN: CPT | Performed by: ORTHOPAEDIC SURGERY

## 2022-04-13 RX ORDER — MELOXICAM 15 MG/1
TABLET ORAL
Qty: 30 TABLET | Refills: 0 | Status: SHIPPED | OUTPATIENT
Start: 2022-04-13 | End: 2022-05-13

## 2022-04-13 NOTE — PROGRESS NOTES
Chief Complaint  Pain of the Left Knee    Subjective    History of Present Illness      Monica Fitzpatrick is a 61 y.o. female who presents to [facility] for persistent left knee pain and discomfort following total knee arthroplasty.  History of Present Illness the patient states that she has been having increasing pain and discomfort in her left knee.  She had this left knee replaced in [place] in 2014.  She no longer goes to visit that surgeon down in [place].  She is having increasing pain and discomfort and has been worked up for loosening of the components.  She was seen by Fer scott physicians assistant and then referred to me for loosening on a triple phase bone scan.  The patient states that she can no longer ambulate without pain and discomfort.  She is also had a right total knee arthroplasty performed through this office 5 years ago.  That knee replacement is actually done reasonably well for the patient.  She does not have any issues related to that knee arthroplasty.  The patient states that she has complaints of buckling of the left knee.  The knee tends to give out from underneath her.  She has been using a brace to stabilize the knee.  She also uses a walker for limited amounts of ambulation.  The patient states that she would like to proceed with revision knee surgery.  We have had an extensive discussion with the patient and her  about the options for the differential diagnosis that could be related to knee arthroplasty related pain postoperatively.  She also has chronic low back pain and had received LESI at the pain clinic this past Monday.  That did generally help with the lower extremity pain and discomfort but not specifically for the knee pain.  Pain Location:  LEFT knee  Radiation: none  Quality: sharp, stabbing  Intensity/Severity: moderate to severe  Duration: Several months  Progression of symptoms: yes, progressive worsening  Onset quality:  "gradual   Timing: constant  Aggravating Factors: rising after sitting, squatting  Alleviating Factors: NSAIDs  Previous Episodes: yes  Associated Symptoms: pain, swelling, clicking/popping  ADLs Affected: ambulating  Previous Treatment: prior surgery       Objective   Vital Signs:   /86 (BP Location: Left arm, Patient Position: Sitting, Cuff Size: Adult)   Pulse 63   Ht 167.6 cm (66\")   Wt 115 kg (254 lb)   SpO2 97%   BMI 41.00 kg/m²     Physical Exam  Physical Exam  Vitals signs and nursing note reviewed.   Constitutional:       Appearance: Normal appearance.   Pulmonary:      Effort: Pulmonary effort is normal.   Skin:     General: Skin is warm and dry.      Capillary Refill: Capillary refill takes less than 2 seconds.   Neurological:      General: No focal deficit present.      Mental Status: He is alert and oriented to person, place, and time. Mental status is at baseline.   Psychiatric:         Mood and Affect: Mood normal.         Behavior: Behavior normal.         Thought Content: Thought content normal.         Judgment: Judgment normal.     Ortho Exam     Bilateral knee.The patient is status post total knee arthroplasty postoperative 8 years on the left side and on the right side it has been 5 year(s). Incision is clean. Calf is soft and nontender. Homans sign is negative. There is no clicking, popping or catching. Anterior and posterior drawer signs are negative.  There is no instability of the components. Appropriate amounts of swelling and bruising are noted. Dorsalis pedis and posterior tibial artery pulses are palpable. Common peroneal nerve function is well preserved. Range of motion is from 0-110 degrees of flexion. Gait is cautious but otherwise fairly normal. There is no evidence of a deep seated joint infection.    Lumbar Spine: The overlying skin and soft tissues are mildly swollen. Deep tendon reflexes are bilaterally, symmetric and equal.  No long tract signs are noted. There is No " evidence of myelopathy. No bowel or bladder deficit noted. Mild spasm of erector spinae muscle is noted. bilaterally sided rotation is associated with pain and discomfort. Straight leg raise test is positive to 60 degrees. Contralateral straight leg raise is negative. Pain radiates to buttock and thigh. Get up and go is slow due to the lumbar pain.No objective motor or sensory function loss is noted.         Result Review :   The following data was reviewed by: Hank Coles MD on 04/13/2022:      Triple phase bone scan is available.  These images are discussed with the patient at length.  There is evidence of loosening of the components.  The tibial component appears to be specifically loose with some metaphyseal bone loss on the tibial side.  These images appear to be confined to the left knee components.          Procedures           Assessment   Assessment and Plan    Diagnoses and all orders for this visit:    1. Presence of right artificial knee joint (Primary)    2. History of total knee replacement, left          Follow Up   · Compression/brace to prevent the knee from buckling and giving out.  · Calcium and vitamin D for bone health.  · Patient states that she wants to proceed with surgical intervention and the risks will be high because this is revision surgery.  · Extensive discussion with the patient about possibility of scar tissue Limited range of motion and also the fact that there could be a subclinical infection causing pain and discomfort which might require placement of an antibiotic spacer to clear up the infection before proceeding with two-stage replacement arthroplasty.  · Rest, ice, compression, and elevation (RICE) therapy  · Stretching and strengthening exercises of the flexors and tenses of the knee joint.  Time spent in the office today with the patient is 50 minutes.  · OTC Tylenol 500-1000mg by mouth every 6 hours as needed for pain   · Follow up in 6 week(s)  The patient was seen today  for preoperative discussion.  The patient has been tried on over-the-counter and prescription NSAID's despite the risks of anti-inflammatory bleeding, peptic ulcers and erosive gastritis with short term benefit only.  Braces have been prescribed for mechanical support.  Patient has been participating in an exercise program specifically targeting joint pain relief with limited benefit. Intraarticular injections have been used periodically with some but not complete relief of pain.  Ambulation aids have also been utilized.      · The details of the surgical procedure were explained including the location of probable incisions and a description of the likely hardware/grafts to be used. The patient understands the likely convalescence after surgery as well as the rehabilitation required.  Also, we have thoroughly discussed with the patient the risks, benefits and alternatives to surgery.  Risks include but are not limited to the risk of infection, joint stiffness, limited range of motion, wound healing problems, scar tissue build up, myocardial infarction, stroke, blood clots (including DVT and/or pulmonary embolus along with the risk of death) neurologic and/or vascular injury, limb length discrepancy, fracture, dislocation, nonunion, malunion, continued pain and need for further surgery including hardware failure requiring revision.   • Patient was given instructions and counseling regarding her condition or for health maintenance advice. Please see specific information pulled into the AVS if appropriate.     Hank Coles MD   Date of Encounter: 4/13/2022   Electronically signed by Hank Coles MD, 04/13/22, 1:06 PM EDT.     EMR Dragon/Transcription disclaimer:  Much of this encounter note is an electronic transcription/translation of spoken language to printed text. The electronic translation of spoken language may permit erroneous, or at times, nonsensical words or phrases to be inadvertently transcribed; Although  I have reviewed the note for such errors, some may still exist.

## 2022-04-13 NOTE — TELEPHONE ENCOUNTER
Refill req Meloxicam. Last office visit 3/17/2022, office visit today with you. Medication pended. Please review. Thank you.

## 2022-05-05 ENCOUNTER — LAB (OUTPATIENT)
Dept: LAB | Facility: HOSPITAL | Age: 62
End: 2022-05-05

## 2022-05-05 ENCOUNTER — HOSPITAL ENCOUNTER (OUTPATIENT)
Dept: GENERAL RADIOLOGY | Facility: HOSPITAL | Age: 62
Discharge: HOME | End: 2022-05-05

## 2022-05-05 ENCOUNTER — HOSPITAL ENCOUNTER (OUTPATIENT)
Dept: CARDIOLOGY | Facility: HOSPITAL | Age: 62
Discharge: HOME | End: 2022-05-05

## 2022-05-05 DIAGNOSIS — Z96.652 HISTORY OF TOTAL KNEE REPLACEMENT, LEFT: ICD-10-CM

## 2022-05-05 LAB
ABO GROUP BLD: NORMAL
ANION GAP SERPL CALCULATED.3IONS-SCNC: 13.8 MMOL/L (ref 5–15)
BLD GP AB SCN SERPL QL: NEGATIVE
BUN SERPL-MCNC: 11 MG/DL (ref 6–20)
BUN/CREAT SERPL: 12.9 (ref 7–25)
CALCIUM SPEC-SCNC: 9.2 MG/DL (ref 8.6–10.5)
CHLORIDE SERPL-SCNC: 101 MMOL/L (ref 98–107)
CO2 SERPL-SCNC: 27.2 MMOL/L (ref 22–29)
CREAT SERPL-MCNC: 0.85 MG/DL (ref 0.57–1)
DEPRECATED RDW RBC AUTO: 42.5 FL (ref 37–54)
EGFRCR SERPLBLD CKD-EPI 2021: 79 ML/MIN/1.73
ERYTHROCYTE [DISTWIDTH] IN BLOOD BY AUTOMATED COUNT: 13.9 % (ref 12.3–15.4)
GLUCOSE SERPL-MCNC: 117 MG/DL (ref 65–99)
HCT VFR BLD AUTO: 43.1 % (ref 34–46.6)
HGB BLD-MCNC: 14.2 G/DL (ref 12–15.9)
MCH RBC QN AUTO: 27.8 PG (ref 26.6–33)
MCHC RBC AUTO-ENTMCNC: 32.9 G/DL (ref 31.5–35.7)
MCV RBC AUTO: 84.5 FL (ref 79–97)
MRSA DNA SPEC QL NAA+PROBE: NORMAL
PLATELET # BLD AUTO: 236 10*3/MM3 (ref 140–450)
PMV BLD AUTO: 11.5 FL (ref 6–12)
POTASSIUM SERPL-SCNC: 3.2 MMOL/L (ref 3.5–5.2)
RBC # BLD AUTO: 5.1 10*6/MM3 (ref 3.77–5.28)
RH BLD: NEGATIVE
SODIUM SERPL-SCNC: 142 MMOL/L (ref 136–145)
T&S EXPIRATION DATE: NORMAL
WBC NRBC COR # BLD: 7.84 10*3/MM3 (ref 3.4–10.8)

## 2022-05-05 PROCEDURE — 86901 BLOOD TYPING SEROLOGIC RH(D): CPT

## 2022-05-05 PROCEDURE — 86900 BLOOD TYPING SEROLOGIC ABO: CPT

## 2022-05-05 PROCEDURE — 87641 MR-STAPH DNA AMP PROBE: CPT

## 2022-05-05 PROCEDURE — 36415 COLL VENOUS BLD VENIPUNCTURE: CPT

## 2022-05-05 PROCEDURE — 71046 X-RAY EXAM CHEST 2 VIEWS: CPT

## 2022-05-05 PROCEDURE — 80048 BASIC METABOLIC PNL TOTAL CA: CPT

## 2022-05-05 PROCEDURE — 93005 ELECTROCARDIOGRAM TRACING: CPT | Performed by: ORTHOPAEDIC SURGERY

## 2022-05-05 PROCEDURE — 93010 ELECTROCARDIOGRAM REPORT: CPT | Performed by: INTERNAL MEDICINE

## 2022-05-05 PROCEDURE — 85027 COMPLETE CBC AUTOMATED: CPT

## 2022-05-05 PROCEDURE — 86850 RBC ANTIBODY SCREEN: CPT

## 2022-05-09 ENCOUNTER — OFFICE VISIT (OUTPATIENT)
Dept: CARDIOLOGY | Facility: CLINIC | Age: 62
End: 2022-05-09

## 2022-05-09 VITALS
SYSTOLIC BLOOD PRESSURE: 134 MMHG | HEART RATE: 69 BPM | OXYGEN SATURATION: 99 % | HEIGHT: 66 IN | DIASTOLIC BLOOD PRESSURE: 82 MMHG | BODY MASS INDEX: 40.82 KG/M2 | WEIGHT: 254 LBS

## 2022-05-09 DIAGNOSIS — R06.09 DYSPNEA ON EXERTION: ICD-10-CM

## 2022-05-09 DIAGNOSIS — Z01.810 PREOP CARDIOVASCULAR EXAM: Primary | ICD-10-CM

## 2022-05-09 DIAGNOSIS — I10 PRIMARY HYPERTENSION: ICD-10-CM

## 2022-05-09 DIAGNOSIS — R94.31 ABNORMAL EKG: ICD-10-CM

## 2022-05-09 PROCEDURE — 99204 OFFICE O/P NEW MOD 45 MIN: CPT | Performed by: NURSE PRACTITIONER

## 2022-05-09 PROCEDURE — 93000 ELECTROCARDIOGRAM COMPLETE: CPT | Performed by: NURSE PRACTITIONER

## 2022-05-09 RX ORDER — CHLORTHALIDONE 25 MG/1
25 TABLET ORAL DAILY
COMMUNITY
Start: 2022-04-22 | End: 2022-05-09

## 2022-05-10 LAB — QT INTERVAL: 427 MS

## 2022-05-11 PROBLEM — R94.31 ABNORMAL EKG: Status: ACTIVE | Noted: 2022-05-11

## 2022-05-11 NOTE — PROGRESS NOTES
Cardiology Office New Patient Visit      Primary Care Provider:  Chaya Dorsey MD    Reason for Visit:     Preoperative cardiovascular risk assessment requested  Degenerative joint disease  Abnormal EKG  Mild dyspnea  Diabetes  Hypertension  Dyslipidemia      Subjective     CC: Complains of knee pain, occasional edema and mild dyspnea with exertion    History of Present Illness       Monica Fitzpatrick is a 61 y.o. female.  Patient is a very pleasant 61-year-old female who presents today for evaluation and requesting preoperative cardiovascular risk assessment prior to upcoming left total knee replacement planned by Dr. Coles at [facility].    Her surgery is scheduled for May 27.    The patient had a preoperative EKG that was abnormal showing sinus rhythm with a right bundle branch block and left anterior fascicular block.    The patient is not known to have CAD, previous MI or heart failure.    She has multiple cardiovascular risk factors including hypertension, dyslipidemia, diabetes, family history of coronary artery disease and obesity.    She denies any exertional chest pain though admits to not being very active due to being limited from her knee.  She has mild dyspnea with exertion and reports at times she notices some swelling in her lower extremities.    She has not had any type of recent noninvasive ischemic evaluation or structural evaluation.            ASSESSMENT/PLAN:        Diagnoses and all orders for this visit:    1. Preop cardiovascular exam (Primary)  Comments:  Preoperative cardiovascular risk assessment requested prior to upcoming repeat left total knee replacement  Orders:  -     ECG 12 Lead  -     Adult Transthoracic Echo Complete W/ Cont if Necessary Per Protocol; Future  -     Stress Test With Myocardial Perfusion (1 Day); Future    2. Dyspnea on exertion  Comments:  Patient does complain of some dyspnea with exertion  Orders:  -     ECG 12 Lead  -     Adult  Transthoracic Echo Complete W/ Cont if Necessary Per Protocol; Future  -     Stress Test With Myocardial Perfusion (1 Day); Future    3. Abnormal EKG  Comments:  Preoperative EKG shows sinus rhythm with right bundle branch block and left anterior fascicular block.  No previous EKGs for review    4. Primary hypertension  Comments:  Stable on current medical therapy        MEDICAL DECISION MAKING:    Patient is requesting preoperative cardiovascular risk assessment.    She has an abnormal baseline EKG with a right bundle branch block and left anterior fascicular block.  There are no ST or T wave segment abnormalities.    She has multiple cardiovascular risk factors.    I would like to proceed with a noninvasive ischemic evaluation with [facility].  The patient would be unable to walk on treadmill due to her knee pain.  In addition to her right bundle branch block and left infra tear fascicular block would like to do structural heart assessment with echocardiogram.    Cardiac clearance will be made pending review of these test.  Patient is in agreement to this plan.      Past Medical History:   Diagnosis Date   • Anxiety and depression    • Diabetes (HCC)    • Dry eye    • Hyperlipidemia    • Hypertension    • PONV (postoperative nausea and vomiting)        Past Surgical History:   Procedure Laterality Date   • REPLACEMENT TOTAL KNEE Left 2014 [place]   • TOTAL KNEE ARTHROPLASTY Right    • WRIST SURGERY Bilateral     plate and screws         Current Outpatient Medications:   •  albuterol sulfate  (90 Base) MCG/ACT inhaler, Ventolin HFA 90 mcg/actuation aerosol inhaler  INHALE 2 PUFFS BY MOUTH FOUR TIMES DAILY, Disp: , Rfl:   •  atorvastatin (LIPITOR) 20 MG tablet, Take 20 mg by mouth Daily., Disp: , Rfl:   •  Blood Glucose Monitoring Suppl (Blood Glucose Monitor System) w/Device kit, BLOOD GLUCOSE MONITOR SYSTEM w/Device KIT, Disp: , Rfl:   •  buPROPion XL (WELLBUTRIN XL) 150 MG 24 hr tablet, Take 1  tablet by mouth Daily., Disp: , Rfl:   •  Cyanocobalamin (B-12) 1000 MCG capsule, LD 5/10, Disp: , Rfl:   •  cycloSPORINE (RESTASIS) 0.05 % ophthalmic emulsion, Restasis 0.05 % eye drops in a dropperette, Disp: , Rfl:   •  Dulaglutide (Trulicity) 1.5 MG/0.5ML solution pen-injector, Trulicity 1.5 mg/0.5 mL subcutaneous pen injector  Inject by subcutaneous route., Disp: , Rfl:   •  FLUoxetine (PROzac) 60 MG tablet, Daily., Disp: , Rfl:   •  fluticasone (FLONASE) 50 MCG/ACT nasal spray, fluticasone propionate 50 mcg/actuation nasal spray,suspension  INSTILL 2 SPRAYS INTO EACH NOSTRIL EVERY DAY, Disp: , Rfl:   •  gabapentin (NEURONTIN) 600 MG tablet, Take 600 mg by mouth 3 (Three) Times a Day., Disp: , Rfl:   •  hydrocortisone 2.5 % cream, hydrocortisone 2.5 % topical cream with perineal applicator, Disp: , Rfl:   •  hydrOXYzine (ATARAX) 25 MG tablet, Take 25 mg by mouth Every 8 (Eight) Hours As Needed., Disp: , Rfl:   •  losartan (COZAAR) 100 MG tablet, Take 100 mg by mouth Daily. HOLD 24 hours before surgery, Disp: , Rfl:   •  meloxicam (MOBIC) 15 MG tablet, TAKE 1 TABLET BY MOUTH DAILY AS NEEDED FOR JOINT PAIN (Patient taking differently: Take 15 mg by mouth Daily. LD 5/10), Disp: 30 tablet, Rfl: 0  •  multivitamin with minerals tablet tablet, Take 1 tablet by mouth Daily. LD 5/10, Disp: , Rfl:   •  mupirocin (BACTROBAN) 2 % ointment, Apply a pea-sized amount to each nostril twice daily for 5 days prior to surgery., Disp: 22 g, Rfl: 0  •  Viibryd 40 MG tablet tablet, Take 40 mg by mouth Daily., Disp: , Rfl:   •  vitamin D3 125 MCG (5000 UT) capsule capsule, Take 5,000 Units by mouth Daily. LD 5/12, Disp: , Rfl:     Social History     Socioeconomic History   • Marital status:    Tobacco Use   • Smoking status: Never Smoker   • Smokeless tobacco: Never Used   Vaping Use   • Vaping Use: Never used   Substance and Sexual Activity   • Alcohol use: Never   • Drug use: Never   • Sexual activity: Defer       Family  "History   Problem Relation Age of Onset   • Diabetes Mother    • Heart attack Father    • Heart disease Father    • Diabetes Father    • Alcohol abuse Father    • Drug abuse Sister    • Alcohol abuse Brother    • Diabetes Maternal Grandmother        The following portions of the patient's history were reviewed and updated as appropriate: allergies, current medications, past family history, past medical history, past social history, past surgical history and problem list.    Review of Systems   Constitutional: Negative for decreased appetite and diaphoresis.   HENT: Negative for congestion, hearing loss and nosebleeds.    Cardiovascular: Positive for dyspnea on exertion and leg swelling. Negative for chest pain, claudication, irregular heartbeat, near-syncope, orthopnea, palpitations, paroxysmal nocturnal dyspnea and syncope.   Respiratory: Negative for cough, shortness of breath and sleep disturbances due to breathing.    Endocrine: Negative for polyuria.   Hematologic/Lymphatic: Does not bruise/bleed easily.   Skin: Negative for itching and rash.   Musculoskeletal: Positive for joint pain. Negative for back pain, muscle weakness and myalgias.   Gastrointestinal: Negative for abdominal pain, change in bowel habit and nausea.   Genitourinary: Negative for dysuria, flank pain, frequency and hesitancy.   Neurological: Negative for dizziness, tremors and weakness.   Psychiatric/Behavioral: Negative for altered mental status. The patient does not have insomnia.        /82 (BP Location: Left arm, Cuff Size: Large Adult)   Pulse 69   Ht 167.6 cm (65.98\")   Wt 115 kg (254 lb)   SpO2 99%   BMI 41.02 kg/m² .    Body mass index is 41.02 kg/m².    Objective     Vitals reviewed.   Constitutional:       General: Not in acute distress.     Appearance: Normal appearance. Well-developed.   Eyes:      Pupils: Pupils are equal, round, and reactive to light.   HENT:      Head: Normocephalic and atraumatic.   Neck:      " Vascular: No JVD.   Pulmonary:      Effort: Pulmonary effort is normal.      Breath sounds: Normal breath sounds.   Cardiovascular:      Normal rate. Regular rhythm.   Pulses:     Intact distal pulses.   Edema:     Peripheral edema absent.   Abdominal:      General: There is no distension.      Palpations: Abdomen is soft.      Tenderness: There is no abdominal tenderness.   Musculoskeletal: Normal range of motion.      Cervical back: Normal range of motion and neck supple. Skin:     General: Skin is warm and dry.   Neurological:      Mental Status: Alert and oriented to person, place, and time.             ECG 12 Lead    Date/Time: 5/9/2022 2:46 PM  Performed by: Gillian Kaye APRN  Authorized by: Gillian Kaye APRN   Comparison: not compared with previous ECG   Rhythm: sinus rhythm  BPM: 69  Conduction: right bundle branch block and left anterior fascicular block  ST Segments: ST segments normal  T Waves: T waves normal  Other: no other findings    Clinical impression: abnormal EKG

## 2022-05-13 ENCOUNTER — HOSPITAL ENCOUNTER (OUTPATIENT)
Dept: NUCLEAR MEDICINE | Facility: HOSPITAL | Age: 62
Discharge: HOME | End: 2022-05-13

## 2022-05-13 ENCOUNTER — HOSPITAL ENCOUNTER (OUTPATIENT)
Dept: CARDIOLOGY | Facility: HOSPITAL | Age: 62
Discharge: HOME | End: 2022-05-13

## 2022-05-13 VITALS
WEIGHT: 254 LBS | BODY MASS INDEX: 40.82 KG/M2 | SYSTOLIC BLOOD PRESSURE: 137 MMHG | DIASTOLIC BLOOD PRESSURE: 88 MMHG | HEIGHT: 66 IN

## 2022-05-13 DIAGNOSIS — R06.09 DYSPNEA ON EXERTION: ICD-10-CM

## 2022-05-13 DIAGNOSIS — Z01.810 PREOP CARDIOVASCULAR EXAM: ICD-10-CM

## 2022-05-13 DIAGNOSIS — Z96.652 HISTORY OF TOTAL KNEE REPLACEMENT, LEFT: ICD-10-CM

## 2022-05-13 PROCEDURE — A9502 TC99M TETROFOSMIN: HCPCS | Performed by: NURSE PRACTITIONER

## 2022-05-13 PROCEDURE — 25010000002 REGADENOSON 0.4 MG/5ML SOLUTION: Performed by: NURSE PRACTITIONER

## 2022-05-13 PROCEDURE — 93016 CV STRESS TEST SUPVJ ONLY: CPT | Performed by: INTERNAL MEDICINE

## 2022-05-13 PROCEDURE — 93306 TTE W/DOPPLER COMPLETE: CPT | Performed by: INTERNAL MEDICINE

## 2022-05-13 PROCEDURE — 93017 CV STRESS TEST TRACING ONLY: CPT

## 2022-05-13 PROCEDURE — 0 TECHNETIUM TETROFOSMIN KIT: Performed by: NURSE PRACTITIONER

## 2022-05-13 PROCEDURE — 78452 HT MUSCLE IMAGE SPECT MULT: CPT

## 2022-05-13 PROCEDURE — 93306 TTE W/DOPPLER COMPLETE: CPT

## 2022-05-13 PROCEDURE — 93018 CV STRESS TEST I&R ONLY: CPT | Performed by: INTERNAL MEDICINE

## 2022-05-13 PROCEDURE — 78452 HT MUSCLE IMAGE SPECT MULT: CPT | Performed by: INTERNAL MEDICINE

## 2022-05-13 RX ORDER — MELOXICAM 15 MG/1
TABLET ORAL
Qty: 30 TABLET | Refills: 0 | Status: SHIPPED | OUTPATIENT
Start: 2022-05-13 | End: 2022-07-21

## 2022-05-13 RX ADMIN — TETROFOSMIN 1 DOSE: 1.38 INJECTION, POWDER, LYOPHILIZED, FOR SOLUTION INTRAVENOUS at 13:57

## 2022-05-13 RX ADMIN — TETROFOSMIN 1 DOSE: 1.38 INJECTION, POWDER, LYOPHILIZED, FOR SOLUTION INTRAVENOUS at 12:27

## 2022-05-13 RX ADMIN — REGADENOSON 0.4 MG: 0.08 INJECTION, SOLUTION INTRAVENOUS at 13:57

## 2022-05-16 ENCOUNTER — TELEPHONE (OUTPATIENT)
Dept: CARDIOLOGY | Facility: CLINIC | Age: 62
End: 2022-05-16

## 2022-05-17 LAB
BH CV REST NUCLEAR ISOTOPE DOSE: 7.2 MCI
BH CV STRESS BP STAGE 1: NORMAL
BH CV STRESS BP STAGE 2: NORMAL
BH CV STRESS BP STAGE 3: NORMAL
BH CV STRESS BP STAGE 4: NORMAL
BH CV STRESS COMMENTS STAGE 1: NORMAL
BH CV STRESS COMMENTS STAGE 2: NORMAL
BH CV STRESS DOSE REGADENOSON STAGE 1: 0.4
BH CV STRESS DURATION MIN STAGE 1: 0
BH CV STRESS DURATION MIN STAGE 2: 4
BH CV STRESS DURATION SEC STAGE 1: 10
BH CV STRESS DURATION SEC STAGE 2: 0
BH CV STRESS HR STAGE 1: 82
BH CV STRESS HR STAGE 2: 80
BH CV STRESS HR STAGE 3: 81
BH CV STRESS HR STAGE 4: 80
BH CV STRESS NUCLEAR ISOTOPE DOSE: 21.6 MCI
BH CV STRESS PROTOCOL 1: NORMAL
BH CV STRESS RECOVERY BP: NORMAL MMHG
BH CV STRESS RECOVERY HR: 75 BPM
BH CV STRESS STAGE 1: 1
BH CV STRESS STAGE 2: 2
BH CV STRESS STAGE 3: 3
BH CV STRESS STAGE 4: 4
LV EF NUC BP: 71 %
MAXIMAL PREDICTED HEART RATE: 161 BPM
PERCENT MAX PREDICTED HR: 50.93 %
STRESS BASELINE BP: NORMAL MMHG
STRESS BASELINE HR: 63 BPM
STRESS PERCENT HR: 60 %
STRESS POST PEAK BP: NORMAL MMHG
STRESS POST PEAK HR: 82 BPM
STRESS TARGET HR: 137 BPM

## 2022-05-21 ENCOUNTER — LAB (OUTPATIENT)
Dept: LAB | Facility: HOSPITAL | Age: 62
End: 2022-05-21

## 2022-05-21 DIAGNOSIS — Z96.652 HISTORY OF TOTAL KNEE REPLACEMENT, LEFT: ICD-10-CM

## 2022-05-21 LAB
ABO GROUP BLD: NORMAL
BH CV ECHO MEAS - ACS: 1.98 CM
BH CV ECHO MEAS - AO MAX PG: 4.4 MMHG
BH CV ECHO MEAS - AO MEAN PG: 2.6 MMHG
BH CV ECHO MEAS - AO ROOT DIAM: 3.2 CM
BH CV ECHO MEAS - AO V2 MAX: 105 CM/SEC
BH CV ECHO MEAS - AO V2 VTI: 22.6 CM
BH CV ECHO MEAS - AVA(I,D): 2.7 CM2
BH CV ECHO MEAS - EDV(CUBED): 120.5 ML
BH CV ECHO MEAS - EDV(MOD-SP2): 72.5 ML
BH CV ECHO MEAS - EDV(MOD-SP4): 85.5 ML
BH CV ECHO MEAS - EF(MOD-BP): 55 %
BH CV ECHO MEAS - EF(MOD-SP2): 50.8 %
BH CV ECHO MEAS - EF(MOD-SP4): 61.8 %
BH CV ECHO MEAS - ESV(CUBED): 38 ML
BH CV ECHO MEAS - ESV(MOD-SP2): 35.6 ML
BH CV ECHO MEAS - ESV(MOD-SP4): 32.7 ML
BH CV ECHO MEAS - FS: 31.9 %
BH CV ECHO MEAS - IVS/LVPW: 1.02 CM
BH CV ECHO MEAS - IVSD: 1.1 CM
BH CV ECHO MEAS - LA DIMENSION(2D): 3.7 CM
BH CV ECHO MEAS - LV DIASTOLIC VOL/BSA (35-75): 39.6 CM2
BH CV ECHO MEAS - LV MASS(C)D: 201.6 GRAMS
BH CV ECHO MEAS - LV MAX PG: 2.11 MMHG
BH CV ECHO MEAS - LV MEAN PG: 1.07 MMHG
BH CV ECHO MEAS - LV SYSTOLIC VOL/BSA (12-30): 15.1 CM2
BH CV ECHO MEAS - LV V1 MAX: 72.7 CM/SEC
BH CV ECHO MEAS - LV V1 VTI: 16.3 CM
BH CV ECHO MEAS - LVIDD: 4.9 CM
BH CV ECHO MEAS - LVIDS: 3.4 CM
BH CV ECHO MEAS - LVOT AREA: 3.8 CM2
BH CV ECHO MEAS - LVOT DIAM: 2.19 CM
BH CV ECHO MEAS - LVPWD: 1.08 CM
BH CV ECHO MEAS - MV A MAX VEL: 82 CM/SEC
BH CV ECHO MEAS - MV DEC SLOPE: 692.4 CM/SEC2
BH CV ECHO MEAS - MV DEC TIME: 0.12 MSEC
BH CV ECHO MEAS - MV E MAX VEL: 86.4 CM/SEC
BH CV ECHO MEAS - MV E/A: 1.05
BH CV ECHO MEAS - MV MAX PG: 3.2 MMHG
BH CV ECHO MEAS - MV MEAN PG: 1.84 MMHG
BH CV ECHO MEAS - MV V2 VTI: 26.9 CM
BH CV ECHO MEAS - MVA(VTI): 2.3 CM2
BH CV ECHO MEAS - PA V2 MAX: 94 CM/SEC
BH CV ECHO MEAS - RV MAX PG: 1.28 MMHG
BH CV ECHO MEAS - RV V1 MAX: 56.5 CM/SEC
BH CV ECHO MEAS - RV V1 VTI: 11.3 CM
BH CV ECHO MEAS - RVDD: 2.37 CM
BH CV ECHO MEAS - SI(MOD-SP2): 17.1 ML/M2
BH CV ECHO MEAS - SI(MOD-SP4): 24.5 ML/M2
BH CV ECHO MEAS - SV(LVOT): 61.8 ML
BH CV ECHO MEAS - SV(MOD-SP2): 36.8 ML
BH CV ECHO MEAS - SV(MOD-SP4): 52.8 ML
BLD GP AB SCN SERPL QL: NEGATIVE
MAXIMAL PREDICTED HEART RATE: 161 BPM
POTASSIUM SERPL-SCNC: 3.6 MMOL/L (ref 3.5–5.2)
RH BLD: NEGATIVE
STRESS TARGET HR: 137 BPM
T&S EXPIRATION DATE: NORMAL

## 2022-05-21 PROCEDURE — 86850 RBC ANTIBODY SCREEN: CPT

## 2022-05-21 PROCEDURE — U0004 COV-19 TEST NON-CDC HGH THRU: HCPCS

## 2022-05-21 PROCEDURE — 84132 ASSAY OF SERUM POTASSIUM: CPT

## 2022-05-21 PROCEDURE — C9803 HOPD COVID-19 SPEC COLLECT: HCPCS

## 2022-05-21 PROCEDURE — 86901 BLOOD TYPING SEROLOGIC RH(D): CPT

## 2022-05-21 PROCEDURE — 36415 COLL VENOUS BLD VENIPUNCTURE: CPT

## 2022-05-21 PROCEDURE — 86900 BLOOD TYPING SEROLOGIC ABO: CPT

## 2022-05-22 LAB — SARS-COV-2 ORF1AB RESP QL NAA+PROBE: NOT DETECTED

## 2022-05-22 NOTE — PROGRESS NOTES
Please let pt know that her echo looked ok  Her heart pumping is normal   No significant valve problems  She has some stiffness to her heart muscle and main recommendation for that is to try to keep blood pressure controlled  Would schedule f/u with us in 6 months please

## 2022-05-23 ENCOUNTER — ANESTHESIA EVENT (OUTPATIENT)
Dept: PERIOP | Facility: HOSPITAL | Age: 62
End: 2022-05-23

## 2022-05-24 ENCOUNTER — HOSPITAL ENCOUNTER (INPATIENT)
Facility: HOSPITAL | Age: 62
LOS: 1 days | Discharge: HOME HEALTH | End: 2022-05-25
Attending: ORTHOPAEDIC SURGERY | Admitting: ORTHOPAEDIC SURGERY

## 2022-05-24 ENCOUNTER — ANESTHESIA (OUTPATIENT)
Dept: PERIOP | Facility: HOSPITAL | Age: 62
End: 2022-05-24

## 2022-05-24 ENCOUNTER — APPOINTMENT (OUTPATIENT)
Dept: GENERAL RADIOLOGY | Facility: HOSPITAL | Age: 62
End: 2022-05-24

## 2022-05-24 DIAGNOSIS — Z96.652 S/P REVISION OF TOTAL KNEE, LEFT: Primary | ICD-10-CM

## 2022-05-24 DIAGNOSIS — F41.1 ANXIETY STATE: ICD-10-CM

## 2022-05-24 DIAGNOSIS — Z96.652 HISTORY OF TOTAL KNEE REPLACEMENT, LEFT: ICD-10-CM

## 2022-05-24 LAB
GLUCOSE BLDC GLUCOMTR-MCNC: 167 MG/DL (ref 70–105)
GLUCOSE BLDC GLUCOMTR-MCNC: 188 MG/DL (ref 70–105)
GLUCOSE BLDC GLUCOMTR-MCNC: 192 MG/DL (ref 70–105)
GLUCOSE BLDC GLUCOMTR-MCNC: 254 MG/DL (ref 70–105)

## 2022-05-24 PROCEDURE — 94799 UNLISTED PULMONARY SVC/PX: CPT

## 2022-05-24 PROCEDURE — 25010000002 HYDRALAZINE PER 20 MG

## 2022-05-24 PROCEDURE — 0 BUPIVACAINE LIPOSOME 1.3 % SUSPENSION: Performed by: ORTHOPAEDIC SURGERY

## 2022-05-24 PROCEDURE — C1776 JOINT DEVICE (IMPLANTABLE): HCPCS | Performed by: ORTHOPAEDIC SURGERY

## 2022-05-24 PROCEDURE — 25010000002 VANCOMYCIN 10 G RECONSTITUTED SOLUTION: Performed by: PHYSICIAN ASSISTANT

## 2022-05-24 PROCEDURE — 25010000002 ONDANSETRON PER 1 MG

## 2022-05-24 PROCEDURE — 88331 PATH CONSLTJ SURG 1 BLK 1SPC: CPT | Performed by: ORTHOPAEDIC SURGERY

## 2022-05-24 PROCEDURE — 27487 REVISE/REPLACE KNEE JOINT: CPT | Performed by: ORTHOPAEDIC SURGERY

## 2022-05-24 PROCEDURE — 76942 ECHO GUIDE FOR BIOPSY: CPT | Performed by: ORTHOPAEDIC SURGERY

## 2022-05-24 PROCEDURE — 25010000002 PROPOFOL 10 MG/ML EMULSION

## 2022-05-24 PROCEDURE — 25010000002 MIDAZOLAM PER 1 MG: Performed by: ANESTHESIOLOGY

## 2022-05-24 PROCEDURE — 0SPW0JZ REMOVAL OF SYNTHETIC SUBSTITUTE FROM LEFT KNEE JOINT, TIBIAL SURFACE, OPEN APPROACH: ICD-10-PCS | Performed by: ORTHOPAEDIC SURGERY

## 2022-05-24 PROCEDURE — 97161 PT EVAL LOW COMPLEX 20 MIN: CPT

## 2022-05-24 PROCEDURE — 94760 N-INVAS EAR/PLS OXIMETRY 1: CPT

## 2022-05-24 PROCEDURE — S0260 H&P FOR SURGERY: HCPCS | Performed by: ORTHOPAEDIC SURGERY

## 2022-05-24 PROCEDURE — 25010000002 NALOXONE PER 1 MG

## 2022-05-24 PROCEDURE — 94761 N-INVAS EAR/PLS OXIMETRY MLT: CPT

## 2022-05-24 PROCEDURE — C9290 INJ, BUPIVACAINE LIPOSOME: HCPCS | Performed by: ORTHOPAEDIC SURGERY

## 2022-05-24 PROCEDURE — 0SRW0J9 REPLACEMENT OF LEFT KNEE JOINT, TIBIAL SURFACE WITH SYNTHETIC SUBSTITUTE, CEMENTED, OPEN APPROACH: ICD-10-PCS | Performed by: ORTHOPAEDIC SURGERY

## 2022-05-24 PROCEDURE — C1713 ANCHOR/SCREW BN/BN,TIS/BN: HCPCS | Performed by: ORTHOPAEDIC SURGERY

## 2022-05-24 PROCEDURE — 25010000002 FENTANYL CITRATE (PF) 50 MCG/ML SOLUTION: Performed by: ANESTHESIOLOGY

## 2022-05-24 PROCEDURE — 88304 TISSUE EXAM BY PATHOLOGIST: CPT | Performed by: ORTHOPAEDIC SURGERY

## 2022-05-24 PROCEDURE — 25010000002 VANCOMYCIN PER 500 MG: Performed by: PHYSICIAN ASSISTANT

## 2022-05-24 PROCEDURE — 25010000002 ROPIVACAINE PER 1 MG: Performed by: ANESTHESIOLOGY

## 2022-05-24 PROCEDURE — 25010000002 PROPOFOL 200 MG/20ML EMULSION

## 2022-05-24 PROCEDURE — 25010000002 DEXAMETHASONE PER 1 MG: Performed by: ANESTHESIOLOGY

## 2022-05-24 PROCEDURE — 82962 GLUCOSE BLOOD TEST: CPT

## 2022-05-24 PROCEDURE — 25010000002 CEFAZOLIN PER 500 MG: Performed by: ORTHOPAEDIC SURGERY

## 2022-05-24 PROCEDURE — 25010000002 VANCOMYCIN 1 G RECONSTITUTED SOLUTION 1 EACH VIAL

## 2022-05-24 PROCEDURE — 0SPU0JZ REMOVAL OF SYNTHETIC SUBSTITUTE FROM LEFT KNEE JOINT, FEMORAL SURFACE, OPEN APPROACH: ICD-10-PCS | Performed by: ORTHOPAEDIC SURGERY

## 2022-05-24 PROCEDURE — 0SRU0J9 REPLACEMENT OF LEFT KNEE JOINT, FEMORAL SURFACE WITH SYNTHETIC SUBSTITUTE, CEMENTED, OPEN APPROACH: ICD-10-PCS | Performed by: ORTHOPAEDIC SURGERY

## 2022-05-24 PROCEDURE — 63710000001 INSULIN LISPRO (HUMAN) PER 5 UNITS: Performed by: PHYSICIAN ASSISTANT

## 2022-05-24 PROCEDURE — 25010000002 ROPIVACAINE PER 1 MG: Performed by: ORTHOPAEDIC SURGERY

## 2022-05-24 PROCEDURE — 25010000002 VANCOMYCIN HCL 1.25 G RECONSTITUTED SOLUTION 1 EACH VIAL: Performed by: PHYSICIAN ASSISTANT

## 2022-05-24 PROCEDURE — 25010000002 HYDROMORPHONE PER 4 MG

## 2022-05-24 PROCEDURE — 71045 X-RAY EXAM CHEST 1 VIEW: CPT

## 2022-05-24 PROCEDURE — 73560 X-RAY EXAM OF KNEE 1 OR 2: CPT

## 2022-05-24 PROCEDURE — 25010000002 NEOSTIGMINE 5 MG/5ML SOLUTION

## 2022-05-24 DEVICE — IMPLANTABLE DEVICE: Type: IMPLANTABLE DEVICE | Site: KNEE | Status: FUNCTIONAL

## 2022-05-24 DEVICE — SCRW HD HEX 3.5X48MM: Type: IMPLANTABLE DEVICE | Site: KNEE | Status: FUNCTIONAL

## 2022-05-24 DEVICE — DEV CONTRL TISS STRATAFIX SPIRAL MNCRYL UD 3/0 PLS 30CM: Type: IMPLANTABLE DEVICE | Site: KNEE | Status: FUNCTIONAL

## 2022-05-24 DEVICE — CMT BONE REFOBACIN R W/GENT 1X40: Type: IMPLANTABLE DEVICE | Site: KNEE | Status: FUNCTIONAL

## 2022-05-24 DEVICE — DEV WND/CLS CONTRL TISS STRATAFIX SYMM PDS PLS CTX 60CM VIL: Type: IMPLANTABLE DEVICE | Site: KNEE | Status: FUNCTIONAL

## 2022-05-24 RX ORDER — LIDOCAINE HYDROCHLORIDE 10 MG/ML
INJECTION, SOLUTION EPIDURAL; INFILTRATION; INTRACAUDAL; PERINEURAL AS NEEDED
Status: DISCONTINUED | OUTPATIENT
Start: 2022-05-24 | End: 2022-05-24 | Stop reason: SURG

## 2022-05-24 RX ORDER — METOPROLOL TARTRATE 5 MG/5ML
INJECTION INTRAVENOUS AS NEEDED
Status: DISCONTINUED | OUTPATIENT
Start: 2022-05-24 | End: 2022-05-24 | Stop reason: SURG

## 2022-05-24 RX ORDER — GLYCOPYRROLATE 0.2 MG/ML
INJECTION INTRAMUSCULAR; INTRAVENOUS AS NEEDED
Status: DISCONTINUED | OUTPATIENT
Start: 2022-05-24 | End: 2022-05-24 | Stop reason: SURG

## 2022-05-24 RX ORDER — ROPIVACAINE HYDROCHLORIDE 5 MG/ML
INJECTION, SOLUTION EPIDURAL; INFILTRATION; PERINEURAL
Status: COMPLETED | OUTPATIENT
Start: 2022-05-24 | End: 2022-05-24

## 2022-05-24 RX ORDER — NICOTINE POLACRILEX 4 MG
15 LOZENGE BUCCAL
Status: DISCONTINUED | OUTPATIENT
Start: 2022-05-24 | End: 2022-05-25 | Stop reason: HOSPADM

## 2022-05-24 RX ORDER — INSULIN LISPRO 100 [IU]/ML
0-9 INJECTION, SOLUTION INTRAVENOUS; SUBCUTANEOUS
Status: DISCONTINUED | OUTPATIENT
Start: 2022-05-24 | End: 2022-05-25 | Stop reason: HOSPADM

## 2022-05-24 RX ORDER — ALBUTEROL SULFATE 2.5 MG/3ML
2.5 SOLUTION RESPIRATORY (INHALATION) ONCE AS NEEDED
Status: DISCONTINUED | OUTPATIENT
Start: 2022-05-24 | End: 2022-05-24

## 2022-05-24 RX ORDER — CYCLOSPORINE 0.5 MG/ML
1 EMULSION OPHTHALMIC DAILY
Status: DISCONTINUED | OUTPATIENT
Start: 2022-05-24 | End: 2022-05-25 | Stop reason: HOSPADM

## 2022-05-24 RX ORDER — IPRATROPIUM BROMIDE AND ALBUTEROL SULFATE 2.5; .5 MG/3ML; MG/3ML
3 SOLUTION RESPIRATORY (INHALATION) ONCE AS NEEDED
Status: COMPLETED | OUTPATIENT
Start: 2022-05-24 | End: 2022-05-24

## 2022-05-24 RX ORDER — ROPIVACAINE HYDROCHLORIDE 5 MG/ML
INJECTION, SOLUTION EPIDURAL; INFILTRATION; PERINEURAL AS NEEDED
Status: DISCONTINUED | OUTPATIENT
Start: 2022-05-24 | End: 2022-05-24 | Stop reason: HOSPADM

## 2022-05-24 RX ORDER — VILAZODONE HYDROCHLORIDE 40 MG/1
40 TABLET ORAL
Status: DISCONTINUED | OUTPATIENT
Start: 2022-05-25 | End: 2022-05-25 | Stop reason: HOSPADM

## 2022-05-24 RX ORDER — SODIUM CHLORIDE 9 MG/ML
100 INJECTION, SOLUTION INTRAVENOUS CONTINUOUS
Status: DISCONTINUED | OUTPATIENT
Start: 2022-05-24 | End: 2022-05-25 | Stop reason: HOSPADM

## 2022-05-24 RX ORDER — DIPHENHYDRAMINE HCL 25 MG
25 CAPSULE ORAL EVERY 6 HOURS PRN
Status: DISCONTINUED | OUTPATIENT
Start: 2022-05-24 | End: 2022-05-25 | Stop reason: HOSPADM

## 2022-05-24 RX ORDER — ALBUTEROL SULFATE 90 UG/1
2 AEROSOL, METERED RESPIRATORY (INHALATION) EVERY 4 HOURS PRN
Status: DISCONTINUED | OUTPATIENT
Start: 2022-05-24 | End: 2022-05-25 | Stop reason: HOSPADM

## 2022-05-24 RX ORDER — SODIUM CHLORIDE, SODIUM LACTATE, POTASSIUM CHLORIDE, CALCIUM CHLORIDE 600; 310; 30; 20 MG/100ML; MG/100ML; MG/100ML; MG/100ML
9 INJECTION, SOLUTION INTRAVENOUS CONTINUOUS PRN
Status: DISCONTINUED | OUTPATIENT
Start: 2022-05-24 | End: 2022-05-24

## 2022-05-24 RX ORDER — OLANZAPINE 10 MG/2ML
1 INJECTION, POWDER, LYOPHILIZED, FOR SOLUTION INTRAMUSCULAR
Status: DISCONTINUED | OUTPATIENT
Start: 2022-05-24 | End: 2022-05-25 | Stop reason: HOSPADM

## 2022-05-24 RX ORDER — INSULIN LISPRO 100 [IU]/ML
0-9 INJECTION, SOLUTION INTRAVENOUS; SUBCUTANEOUS AS NEEDED
Status: DISCONTINUED | OUTPATIENT
Start: 2022-05-24 | End: 2022-05-25 | Stop reason: HOSPADM

## 2022-05-24 RX ORDER — HYDROMORPHONE HCL 110MG/55ML
PATIENT CONTROLLED ANALGESIA SYRINGE INTRAVENOUS AS NEEDED
Status: DISCONTINUED | OUTPATIENT
Start: 2022-05-24 | End: 2022-05-24 | Stop reason: SURG

## 2022-05-24 RX ORDER — SODIUM CHLORIDE 0.9 % (FLUSH) 0.9 %
10 SYRINGE (ML) INJECTION EVERY 12 HOURS SCHEDULED
Status: DISCONTINUED | OUTPATIENT
Start: 2022-05-24 | End: 2022-05-24 | Stop reason: HOSPADM

## 2022-05-24 RX ORDER — FENTANYL CITRATE 50 UG/ML
INJECTION, SOLUTION INTRAMUSCULAR; INTRAVENOUS
Status: COMPLETED | OUTPATIENT
Start: 2022-05-24 | End: 2022-05-24

## 2022-05-24 RX ORDER — NEOSTIGMINE METHYLSULFATE 5 MG/5 ML
SYRINGE (ML) INTRAVENOUS AS NEEDED
Status: DISCONTINUED | OUTPATIENT
Start: 2022-05-24 | End: 2022-05-24 | Stop reason: SURG

## 2022-05-24 RX ORDER — FENTANYL CITRATE 50 UG/ML
100 INJECTION, SOLUTION INTRAMUSCULAR; INTRAVENOUS
Status: DISCONTINUED | OUTPATIENT
Start: 2022-05-24 | End: 2022-05-24

## 2022-05-24 RX ORDER — ONDANSETRON 4 MG/1
4 TABLET, FILM COATED ORAL EVERY 6 HOURS PRN
Status: DISCONTINUED | OUTPATIENT
Start: 2022-05-24 | End: 2022-05-25 | Stop reason: HOSPADM

## 2022-05-24 RX ORDER — MELOXICAM 15 MG/1
15 TABLET ORAL ONCE
Status: COMPLETED | OUTPATIENT
Start: 2022-05-24 | End: 2022-05-24

## 2022-05-24 RX ORDER — GABAPENTIN 600 MG/1
600 TABLET ORAL 3 TIMES DAILY
Status: DISCONTINUED | OUTPATIENT
Start: 2022-05-24 | End: 2022-05-25 | Stop reason: HOSPADM

## 2022-05-24 RX ORDER — SODIUM CHLORIDE 0.9 % (FLUSH) 0.9 %
10 SYRINGE (ML) INJECTION AS NEEDED
Status: DISCONTINUED | OUTPATIENT
Start: 2022-05-24 | End: 2022-05-24 | Stop reason: HOSPADM

## 2022-05-24 RX ORDER — ONDANSETRON 2 MG/ML
4 INJECTION INTRAMUSCULAR; INTRAVENOUS ONCE AS NEEDED
Status: DISCONTINUED | OUTPATIENT
Start: 2022-05-24 | End: 2022-05-24 | Stop reason: HOSPADM

## 2022-05-24 RX ORDER — HYDROXYZINE HYDROCHLORIDE 25 MG/1
25 TABLET, FILM COATED ORAL EVERY 8 HOURS PRN
Status: DISCONTINUED | OUTPATIENT
Start: 2022-05-24 | End: 2022-05-25 | Stop reason: HOSPADM

## 2022-05-24 RX ORDER — POLYETHYLENE GLYCOL 3350 17 G/17G
17 POWDER, FOR SOLUTION ORAL DAILY
Status: DISCONTINUED | OUTPATIENT
Start: 2022-05-24 | End: 2022-05-25 | Stop reason: HOSPADM

## 2022-05-24 RX ORDER — PROMETHAZINE HYDROCHLORIDE 25 MG/1
25 SUPPOSITORY RECTAL ONCE AS NEEDED
Status: DISCONTINUED | OUTPATIENT
Start: 2022-05-24 | End: 2022-05-24 | Stop reason: HOSPADM

## 2022-05-24 RX ORDER — DIPHENHYDRAMINE HYDROCHLORIDE 50 MG/ML
25 INJECTION INTRAMUSCULAR; INTRAVENOUS EVERY 6 HOURS PRN
Status: DISCONTINUED | OUTPATIENT
Start: 2022-05-24 | End: 2022-05-25 | Stop reason: HOSPADM

## 2022-05-24 RX ORDER — GABAPENTIN 300 MG/1
600 CAPSULE ORAL
Status: DISCONTINUED | OUTPATIENT
Start: 2022-05-24 | End: 2022-05-24 | Stop reason: HOSPADM

## 2022-05-24 RX ORDER — PROPOFOL 10 MG/ML
INJECTION, EMULSION INTRAVENOUS AS NEEDED
Status: DISCONTINUED | OUTPATIENT
Start: 2022-05-24 | End: 2022-05-24 | Stop reason: SURG

## 2022-05-24 RX ORDER — ONDANSETRON 2 MG/ML
INJECTION INTRAMUSCULAR; INTRAVENOUS AS NEEDED
Status: DISCONTINUED | OUTPATIENT
Start: 2022-05-24 | End: 2022-05-24 | Stop reason: SURG

## 2022-05-24 RX ORDER — HYDRALAZINE HYDROCHLORIDE 20 MG/ML
INJECTION INTRAMUSCULAR; INTRAVENOUS AS NEEDED
Status: DISCONTINUED | OUTPATIENT
Start: 2022-05-24 | End: 2022-05-24 | Stop reason: SURG

## 2022-05-24 RX ORDER — MELOXICAM 15 MG/1
15 TABLET ORAL DAILY
Status: DISCONTINUED | OUTPATIENT
Start: 2022-05-25 | End: 2022-05-25 | Stop reason: HOSPADM

## 2022-05-24 RX ORDER — OXYCODONE HCL 10 MG/1
10 TABLET, FILM COATED, EXTENDED RELEASE ORAL EVERY 12 HOURS SCHEDULED
Status: DISCONTINUED | OUTPATIENT
Start: 2022-05-24 | End: 2022-05-25 | Stop reason: HOSPADM

## 2022-05-24 RX ORDER — TRANEXAMIC ACID 10 MG/ML
1000 INJECTION, SOLUTION INTRAVENOUS ONCE
Status: COMPLETED | OUTPATIENT
Start: 2022-05-24 | End: 2022-05-24

## 2022-05-24 RX ORDER — FLUMAZENIL 0.1 MG/ML
0.2 INJECTION INTRAVENOUS AS NEEDED
Status: DISCONTINUED | OUTPATIENT
Start: 2022-05-24 | End: 2022-05-24

## 2022-05-24 RX ORDER — ACETAMINOPHEN 650 MG
TABLET, EXTENDED RELEASE ORAL AS NEEDED
Status: DISCONTINUED | OUTPATIENT
Start: 2022-05-24 | End: 2022-05-24 | Stop reason: HOSPADM

## 2022-05-24 RX ORDER — DEXAMETHASONE SODIUM PHOSPHATE 4 MG/ML
INJECTION, SOLUTION INTRA-ARTICULAR; INTRALESIONAL; INTRAMUSCULAR; INTRAVENOUS; SOFT TISSUE
Status: COMPLETED | OUTPATIENT
Start: 2022-05-24 | End: 2022-05-24

## 2022-05-24 RX ORDER — NALOXONE HCL 0.4 MG/ML
0.4 VIAL (ML) INJECTION
Status: DISCONTINUED | OUTPATIENT
Start: 2022-05-24 | End: 2022-05-25 | Stop reason: HOSPADM

## 2022-05-24 RX ORDER — NALOXONE HCL 0.4 MG/ML
0.4 VIAL (ML) INJECTION AS NEEDED
Status: DISCONTINUED | OUTPATIENT
Start: 2022-05-24 | End: 2022-05-24

## 2022-05-24 RX ORDER — MIDAZOLAM HYDROCHLORIDE 1 MG/ML
INJECTION INTRAMUSCULAR; INTRAVENOUS
Status: COMPLETED | OUTPATIENT
Start: 2022-05-24 | End: 2022-05-24

## 2022-05-24 RX ORDER — ATORVASTATIN CALCIUM 20 MG/1
20 TABLET, FILM COATED ORAL NIGHTLY
Status: DISCONTINUED | OUTPATIENT
Start: 2022-05-24 | End: 2022-05-25 | Stop reason: HOSPADM

## 2022-05-24 RX ORDER — MORPHINE SULFATE 4 MG/ML
4 INJECTION, SOLUTION INTRAMUSCULAR; INTRAVENOUS
Status: DISCONTINUED | OUTPATIENT
Start: 2022-05-24 | End: 2022-05-25 | Stop reason: HOSPADM

## 2022-05-24 RX ORDER — FERROUS SULFATE TAB EC 324 MG (65 MG FE EQUIVALENT) 324 (65 FE) MG
324 TABLET DELAYED RESPONSE ORAL
Status: DISCONTINUED | OUTPATIENT
Start: 2022-05-25 | End: 2022-05-25 | Stop reason: HOSPADM

## 2022-05-24 RX ORDER — PROMETHAZINE HYDROCHLORIDE 25 MG/1
12.5 TABLET ORAL EVERY 6 HOURS PRN
Status: DISCONTINUED | OUTPATIENT
Start: 2022-05-24 | End: 2022-05-25 | Stop reason: HOSPADM

## 2022-05-24 RX ORDER — PROMETHAZINE HYDROCHLORIDE 25 MG/1
25 TABLET ORAL ONCE AS NEEDED
Status: DISCONTINUED | OUTPATIENT
Start: 2022-05-24 | End: 2022-05-24 | Stop reason: HOSPADM

## 2022-05-24 RX ORDER — DEXTROSE MONOHYDRATE 25 G/50ML
25 INJECTION, SOLUTION INTRAVENOUS
Status: DISCONTINUED | OUTPATIENT
Start: 2022-05-24 | End: 2022-05-25 | Stop reason: HOSPADM

## 2022-05-24 RX ORDER — LOSARTAN POTASSIUM 50 MG/1
100 TABLET ORAL
Status: DISCONTINUED | OUTPATIENT
Start: 2022-05-25 | End: 2022-05-25 | Stop reason: HOSPADM

## 2022-05-24 RX ORDER — ONDANSETRON 2 MG/ML
4 INJECTION INTRAMUSCULAR; INTRAVENOUS EVERY 6 HOURS PRN
Status: DISCONTINUED | OUTPATIENT
Start: 2022-05-24 | End: 2022-05-25 | Stop reason: HOSPADM

## 2022-05-24 RX ORDER — ROCURONIUM BROMIDE 10 MG/ML
INJECTION, SOLUTION INTRAVENOUS AS NEEDED
Status: DISCONTINUED | OUTPATIENT
Start: 2022-05-24 | End: 2022-05-24 | Stop reason: SURG

## 2022-05-24 RX ORDER — BUPROPION HYDROCHLORIDE 150 MG/1
150 TABLET ORAL EVERY MORNING
Status: DISCONTINUED | OUTPATIENT
Start: 2022-05-25 | End: 2022-05-25 | Stop reason: HOSPADM

## 2022-05-24 RX ORDER — ACETAMINOPHEN 500 MG
1000 TABLET ORAL ONCE
Status: COMPLETED | OUTPATIENT
Start: 2022-05-24 | End: 2022-05-24

## 2022-05-24 RX ORDER — MEPERIDINE HYDROCHLORIDE 25 MG/ML
12.5 INJECTION INTRAMUSCULAR; INTRAVENOUS; SUBCUTANEOUS
Status: DISCONTINUED | OUTPATIENT
Start: 2022-05-24 | End: 2022-05-24 | Stop reason: HOSPADM

## 2022-05-24 RX ORDER — MIDAZOLAM HYDROCHLORIDE 1 MG/ML
1 INJECTION INTRAMUSCULAR; INTRAVENOUS
Status: DISCONTINUED | OUTPATIENT
Start: 2022-05-24 | End: 2022-05-24 | Stop reason: HOSPADM

## 2022-05-24 RX ORDER — OXYCODONE HYDROCHLORIDE 5 MG/1
5 TABLET ORAL EVERY 4 HOURS PRN
Status: DISCONTINUED | OUTPATIENT
Start: 2022-05-24 | End: 2022-05-25 | Stop reason: HOSPADM

## 2022-05-24 RX ORDER — SCOLOPAMINE TRANSDERMAL SYSTEM 1 MG/1
PATCH, EXTENDED RELEASE TRANSDERMAL AS NEEDED
Status: DISCONTINUED | OUTPATIENT
Start: 2022-05-24 | End: 2022-05-24 | Stop reason: SURG

## 2022-05-24 RX ORDER — OXYCODONE HCL 10 MG/1
10 TABLET, FILM COATED, EXTENDED RELEASE ORAL
Status: COMPLETED | OUTPATIENT
Start: 2022-05-24 | End: 2022-05-24

## 2022-05-24 RX ORDER — ACETAMINOPHEN 500 MG
1000 TABLET ORAL EVERY 8 HOURS
Status: DISCONTINUED | OUTPATIENT
Start: 2022-05-24 | End: 2022-05-25 | Stop reason: HOSPADM

## 2022-05-24 RX ADMIN — LIDOCAINE HYDROCHLORIDE 50 MG: 10 INJECTION, SOLUTION EPIDURAL; INFILTRATION; INTRACAUDAL; PERINEURAL at 07:42

## 2022-05-24 RX ADMIN — PROPOFOL 200 MG: 10 INJECTION, EMULSION INTRAVENOUS at 07:42

## 2022-05-24 RX ADMIN — ACETAMINOPHEN 1000 MG: 500 TABLET ORAL at 16:26

## 2022-05-24 RX ADMIN — SODIUM CHLORIDE, POTASSIUM CHLORIDE, SODIUM LACTATE AND CALCIUM CHLORIDE: 600; 310; 30; 20 INJECTION, SOLUTION INTRAVENOUS at 09:35

## 2022-05-24 RX ADMIN — METOPROLOL TARTRATE 1 MG: 5 INJECTION, SOLUTION INTRAVENOUS at 08:41

## 2022-05-24 RX ADMIN — METOPROLOL TARTRATE 1 MG: 5 INJECTION, SOLUTION INTRAVENOUS at 08:50

## 2022-05-24 RX ADMIN — VANCOMYCIN HYDROCHLORIDE 1250 MG: 1.25 INJECTION, POWDER, LYOPHILIZED, FOR SOLUTION INTRAVENOUS at 20:12

## 2022-05-24 RX ADMIN — CYCLOSPORINE 1 DROP: 0.5 EMULSION OPHTHALMIC at 17:52

## 2022-05-24 RX ADMIN — POLYETHYLENE GLYCOL 3350 17 G: 17 POWDER, FOR SOLUTION ORAL at 16:26

## 2022-05-24 RX ADMIN — HYDROMORPHONE HYDROCHLORIDE 0.5 MG: 2 INJECTION, SOLUTION INTRAMUSCULAR; INTRAVENOUS; SUBCUTANEOUS at 08:14

## 2022-05-24 RX ADMIN — METOPROLOL TARTRATE 1 MG: 5 INJECTION, SOLUTION INTRAVENOUS at 09:06

## 2022-05-24 RX ADMIN — PROPOFOL 125 MCG/KG/MIN: 10 INJECTION, EMULSION INTRAVENOUS at 07:44

## 2022-05-24 RX ADMIN — ACETAMINOPHEN 1000 MG: 500 TABLET ORAL at 07:00

## 2022-05-24 RX ADMIN — HYDRALAZINE HYDROCHLORIDE 5 MG: 20 INJECTION INTRAMUSCULAR; INTRAVENOUS at 09:38

## 2022-05-24 RX ADMIN — METOPROLOL TARTRATE 1 MG: 5 INJECTION, SOLUTION INTRAVENOUS at 09:27

## 2022-05-24 RX ADMIN — VANCOMYCIN HYDROCHLORIDE 1500 MG: 1 INJECTION, POWDER, LYOPHILIZED, FOR SOLUTION INTRAVENOUS at 07:46

## 2022-05-24 RX ADMIN — NALOXONE HYDROCHLORIDE 0.04 MG: 0.4 INJECTION, SOLUTION INTRAMUSCULAR; INTRAVENOUS; SUBCUTANEOUS at 11:55

## 2022-05-24 RX ADMIN — PROPOFOL 40 MG: 10 INJECTION, EMULSION INTRAVENOUS at 08:31

## 2022-05-24 RX ADMIN — HYDROMORPHONE HYDROCHLORIDE 0.5 MG: 2 INJECTION, SOLUTION INTRAMUSCULAR; INTRAVENOUS; SUBCUTANEOUS at 08:40

## 2022-05-24 RX ADMIN — MIDAZOLAM 2 MG: 1 INJECTION INTRAMUSCULAR; INTRAVENOUS at 07:11

## 2022-05-24 RX ADMIN — ATORVASTATIN CALCIUM 20 MG: 20 TABLET, FILM COATED ORAL at 22:24

## 2022-05-24 RX ADMIN — TRANEXAMIC ACID 1000 MG: 10 INJECTION, SOLUTION INTRAVENOUS at 07:50

## 2022-05-24 RX ADMIN — ONDANSETRON 4 MG: 2 INJECTION INTRAMUSCULAR; INTRAVENOUS at 09:23

## 2022-05-24 RX ADMIN — HYDRALAZINE HYDROCHLORIDE 5 MG: 20 INJECTION INTRAMUSCULAR; INTRAVENOUS at 09:30

## 2022-05-24 RX ADMIN — NALOXONE HYDROCHLORIDE 0.04 MG: 0.4 INJECTION, SOLUTION INTRAMUSCULAR; INTRAVENOUS; SUBCUTANEOUS at 10:39

## 2022-05-24 RX ADMIN — SODIUM CHLORIDE, POTASSIUM CHLORIDE, SODIUM LACTATE AND CALCIUM CHLORIDE 9 ML/HR: 600; 310; 30; 20 INJECTION, SOLUTION INTRAVENOUS at 06:58

## 2022-05-24 RX ADMIN — OXYCODONE HYDROCHLORIDE 10 MG: 10 TABLET, FILM COATED, EXTENDED RELEASE ORAL at 07:00

## 2022-05-24 RX ADMIN — ROPIVACAINE HYDROCHLORIDE 30 ML: 5 INJECTION EPIDURAL; INFILTRATION; PERINEURAL at 07:11

## 2022-05-24 RX ADMIN — OXYCODONE HYDROCHLORIDE 10 MG: 10 TABLET, FILM COATED, EXTENDED RELEASE ORAL at 22:24

## 2022-05-24 RX ADMIN — IPRATROPIUM BROMIDE AND ALBUTEROL SULFATE 3 ML: 2.5; .5 SOLUTION RESPIRATORY (INHALATION) at 10:15

## 2022-05-24 RX ADMIN — GABAPENTIN 600 MG: 600 TABLET, FILM COATED ORAL at 16:30

## 2022-05-24 RX ADMIN — RIVAROXABAN 10 MG: 10 TABLET, FILM COATED ORAL at 17:52

## 2022-05-24 RX ADMIN — MUPIROCIN: 20 OINTMENT TOPICAL at 07:00

## 2022-05-24 RX ADMIN — MELOXICAM 15 MG: 15 TABLET ORAL at 07:00

## 2022-05-24 RX ADMIN — DEXAMETHASONE SODIUM PHOSPHATE 4 MG: 4 INJECTION, SOLUTION INTRAMUSCULAR; INTRAVENOUS at 07:11

## 2022-05-24 RX ADMIN — FENTANYL CITRATE 100 MCG: 50 INJECTION, SOLUTION INTRAMUSCULAR; INTRAVENOUS at 07:11

## 2022-05-24 RX ADMIN — METOPROLOL TARTRATE 1 MG: 5 INJECTION, SOLUTION INTRAVENOUS at 09:21

## 2022-05-24 RX ADMIN — Medication 3 MG: at 09:32

## 2022-05-24 RX ADMIN — GABAPENTIN 600 MG: 600 TABLET, FILM COATED ORAL at 22:24

## 2022-05-24 RX ADMIN — VANCOMYCIN HYDROCHLORIDE 1500 MG: 500 INJECTION, POWDER, LYOPHILIZED, FOR SOLUTION INTRAVENOUS at 06:58

## 2022-05-24 RX ADMIN — HYDROMORPHONE HYDROCHLORIDE 0.5 MG: 2 INJECTION, SOLUTION INTRAMUSCULAR; INTRAVENOUS; SUBCUTANEOUS at 08:33

## 2022-05-24 RX ADMIN — INSULIN LISPRO 6 UNITS: 100 INJECTION, SOLUTION INTRAVENOUS; SUBCUTANEOUS at 17:52

## 2022-05-24 RX ADMIN — SCOPALAMINE 1 PATCH: 1 PATCH, EXTENDED RELEASE TRANSDERMAL at 08:19

## 2022-05-24 RX ADMIN — PROPOFOL 40 MG: 10 INJECTION, EMULSION INTRAVENOUS at 08:05

## 2022-05-24 RX ADMIN — ROCURONIUM BROMIDE 50 MG: 50 INJECTION, SOLUTION INTRAVENOUS at 07:42

## 2022-05-24 RX ADMIN — HYDROMORPHONE HYDROCHLORIDE 0.5 MG: 2 INJECTION, SOLUTION INTRAMUSCULAR; INTRAVENOUS; SUBCUTANEOUS at 08:03

## 2022-05-24 RX ADMIN — GLYCOPYRROLATE 0.4 MCG: 0.2 INJECTION INTRAMUSCULAR; INTRAVENOUS at 09:32

## 2022-05-24 NOTE — ANESTHESIA PROCEDURE NOTES
Peripheral Block      Patient reassessed immediately prior to procedure    Patient location during procedure: pre-op  Start time: 5/24/2022 7:00 AM  Reason for block: at surgeon's request and post-op pain management  Performed by  Anesthesiologist: Francois Cox MD  Preanesthetic Checklist  Completed: patient identified, IV checked, site marked, risks and benefits discussed, surgical consent, monitors and equipment checked, pre-op evaluation and timeout performed  Prep:  Pt Position: supine  Sterile barriers:cap, gloves and sterile barriers  Prep: ChloraPrep  Patient monitoring: blood pressure monitoring, continuous pulse oximetry and EKG  Procedure    Sedation: yes  Performed under: local infiltration  Guidance:ultrasound guided    ULTRASOUND INTERPRETATION.  Using ultrasound guidance a 20 G gauge needle was placed in close proximity to the femoral nerve, at which point, under ultrasound guidance anesthetic was injected in the area of the nerve and spread of the anesthesia was seen on ultrasound in close proximity thereto.  There were no abnormalities seen on ultrasound; a digital image was taken; and the patient tolerated the procedure with no complications. Images:still images obtained, printed/placed on chart    Laterality:left  Block Type:adductor canal block  Injection Technique:single-shot  Needle Type:echogenic  Needle Gauge:20 G  Resistance on Injection: none  Sedation medications used: midazolam (VERSED) injection, 2 mg  fentaNYL citrate (PF) (SUBLIMAZE) injection, 100 mcg  Medications Used: dexamethasone (DECADRON) injection, 4 mg  ropivacaine (NAROPIN) 0.5 % injection, 30 mL  Med administered at 5/24/2022 7:11 AM      Medications  Comment:Ultrasound visualization of needle and local anesthetic spread.    Post Assessment  Injection Assessment: negative aspiration for heme, no paresthesia on injection and incremental injection  Patient Tolerance:comfortable throughout block  Complications:no

## 2022-05-24 NOTE — ANESTHESIA POSTPROCEDURE EVALUATION
Patient: Monica Fitzpatrick    Procedure Summary     Date: 05/24/22 Room / Location: [facility] OR  / [facility] MAIN OR    Anesthesia Start: 0732 Anesthesia Stop: 1011    Procedure: TOTAL KNEE ARTHROPLASTY REVISION (Left Knee) Diagnosis:       History of total knee replacement, left      (History of total knee replacement, left [Z96.652])    Surgeons: Hank Coles MD Provider: Francois Cox MD    Anesthesia Type: general with block ASA Status: 3          Anesthesia Type: general with block    Vitals  Vitals Value Taken Time   /81 05/24/22 1322   Temp 97.1 °F (36.2 °C) 05/24/22 1321   Pulse 87 05/24/22 1324   Resp 11 05/24/22 1321   SpO2 92 % 05/24/22 1324   Vitals shown include unvalidated device data.        Post Anesthesia Care and Evaluation    Patient location during evaluation: PACU  Patient participation: complete - patient participated  Level of consciousness: sleepy but conscious and responsive to verbal stimuli  Pain score: 0  Pain management: adequate  Airway patency: patent  Anesthetic complications: No anesthetic complications  PONV Status: none  Cardiovascular status: acceptable  Respiratory status: acceptable and nasal cannula  Hydration status: acceptable

## 2022-05-24 NOTE — ANESTHESIA PREPROCEDURE EVALUATION
Anesthesia Evaluation     Patient summary reviewed and Nursing notes reviewed   history of anesthetic complications: PONV  NPO Solid Status: > 8 hours  NPO Liquid Status: > 2 hours           Airway   Mallampati: II  TM distance: >3 FB  Neck ROM: full  No difficulty expected  Dental - normal exam   (+) upper dentures and poor dentition    Comment: Upper dentures, many broken teeth bottom    Pulmonary - negative pulmonary ROS and normal exam   Cardiovascular - normal exam    ECG reviewed    (+) hypertension well controlled, hyperlipidemia,     ROS comment: ECHO 05/2022:  Interpretation Summary  Left ventricular ejection fraction is 55 to 60%  ·Left ventricular systolic function is normal.  ·Mild dilation of the aortic root is present.  ·Left ventricular diastolic function is consistent with (grade II w/high LAP) pseudonormalization    NL stress test    Neuro/Psych  (+) numbness, psychiatric history Depression and Anxiety,    GI/Hepatic/Renal/Endo    (+) morbid obesity,  diabetes mellitus,     Musculoskeletal     Abdominal  - normal exam    Bowel sounds: normal.   Substance History - negative use     OB/GYN negative ob/gyn ROS         Other   arthritis,                    Anesthesia Plan    ASA 3     general with block     intravenous induction     Anesthetic plan, all risks, benefits, and alternatives have been provided, discussed and informed consent has been obtained with: patient.    Plan discussed with CRNA and CAA.        CODE STATUS:

## 2022-05-24 NOTE — ANESTHESIA PROCEDURE NOTES
Airway  Urgency: elective    Date/Time: 5/24/2022 7:44 AM  Airway not difficult    General Information and Staff    Patient location during procedure: OR  Anesthesiologist: Francois Cox MD  CRNA/CAA: Francine Cristina CAA    Indications and Patient Condition  Indications for airway management: airway protection    Preoxygenated: yes  Mask difficulty assessment: 2 - vent by mask + OA or adjuvant +/- NMBA    Final Airway Details  Final airway type: endotracheal airway      Successful airway: ETT  Cuffed: yes   Successful intubation technique: direct laryngoscopy  Facilitating devices/methods: intubating stylet  Endotracheal tube insertion site: oral  Blade: Charlette  Blade size: 4  ETT size (mm): 7.0  Cormack-Lehane Classification: grade I - full view of glottis  Placement verified by: chest auscultation and capnometry   Measured from: lips  ETT/EBT  to lips (cm): 22  Number of attempts at approach: 1  Assessment: lips, teeth, and gum same as pre-op and atraumatic intubation

## 2022-05-25 VITALS
SYSTOLIC BLOOD PRESSURE: 112 MMHG | HEART RATE: 78 BPM | WEIGHT: 251 LBS | TEMPERATURE: 98.2 F | BODY MASS INDEX: 41.82 KG/M2 | HEIGHT: 65 IN | OXYGEN SATURATION: 92 % | DIASTOLIC BLOOD PRESSURE: 63 MMHG | RESPIRATION RATE: 16 BRPM

## 2022-05-25 LAB
ANION GAP SERPL CALCULATED.3IONS-SCNC: 11 MMOL/L (ref 5–15)
BUN SERPL-MCNC: 10 MG/DL (ref 6–20)
BUN/CREAT SERPL: 10.2 (ref 7–25)
CALCIUM SPEC-SCNC: 8.8 MG/DL (ref 8.6–10.5)
CHLORIDE SERPL-SCNC: 104 MMOL/L (ref 98–107)
CO2 SERPL-SCNC: 24 MMOL/L (ref 22–29)
CREAT SERPL-MCNC: 0.98 MG/DL (ref 0.57–1)
EGFRCR SERPLBLD CKD-EPI 2021: 66.6 ML/MIN/1.73
GLUCOSE BLDC GLUCOMTR-MCNC: 149 MG/DL (ref 70–105)
GLUCOSE BLDC GLUCOMTR-MCNC: 158 MG/DL (ref 70–105)
GLUCOSE SERPL-MCNC: 147 MG/DL (ref 65–99)
HCT VFR BLD AUTO: 36 % (ref 34–46.6)
HGB BLD-MCNC: 11.7 G/DL (ref 12–15.9)
LAB AP CASE REPORT: NORMAL
LAB AP CLINICAL INFORMATION: NORMAL
LAB AP DIAGNOSIS COMMENT: NORMAL
Lab: NORMAL
PATH REPORT.FINAL DX SPEC: NORMAL
PATH REPORT.GROSS SPEC: NORMAL
POTASSIUM SERPL-SCNC: 4 MMOL/L (ref 3.5–5.2)
SODIUM SERPL-SCNC: 139 MMOL/L (ref 136–145)

## 2022-05-25 PROCEDURE — 80048 BASIC METABOLIC PNL TOTAL CA: CPT | Performed by: PHYSICIAN ASSISTANT

## 2022-05-25 PROCEDURE — 97110 THERAPEUTIC EXERCISES: CPT

## 2022-05-25 PROCEDURE — 97116 GAIT TRAINING THERAPY: CPT

## 2022-05-25 PROCEDURE — 82962 GLUCOSE BLOOD TEST: CPT

## 2022-05-25 PROCEDURE — 85018 HEMOGLOBIN: CPT | Performed by: PHYSICIAN ASSISTANT

## 2022-05-25 PROCEDURE — 85014 HEMATOCRIT: CPT | Performed by: PHYSICIAN ASSISTANT

## 2022-05-25 PROCEDURE — 97150 GROUP THERAPEUTIC PROCEDURES: CPT

## 2022-05-25 PROCEDURE — 99024 POSTOP FOLLOW-UP VISIT: CPT | Performed by: PHYSICIAN ASSISTANT

## 2022-05-25 RX ORDER — OXYCODONE HYDROCHLORIDE AND ACETAMINOPHEN 5; 325 MG/1; MG/1
1-2 TABLET ORAL EVERY 6 HOURS PRN
Qty: 50 TABLET | Refills: 0 | Status: SHIPPED | OUTPATIENT
Start: 2022-05-25 | End: 2022-06-01 | Stop reason: SDUPTHER

## 2022-05-25 RX ADMIN — BUPROPION HYDROCHLORIDE 150 MG: 150 TABLET, EXTENDED RELEASE ORAL at 08:01

## 2022-05-25 RX ADMIN — MELOXICAM 15 MG: 15 TABLET ORAL at 08:01

## 2022-05-25 RX ADMIN — POLYETHYLENE GLYCOL 3350 17 G: 17 POWDER, FOR SOLUTION ORAL at 08:01

## 2022-05-25 RX ADMIN — GABAPENTIN 600 MG: 600 TABLET, FILM COATED ORAL at 08:01

## 2022-05-25 RX ADMIN — OXYCODONE 5 MG: 5 TABLET ORAL at 00:11

## 2022-05-25 RX ADMIN — ACETAMINOPHEN 1000 MG: 500 TABLET ORAL at 08:01

## 2022-05-25 RX ADMIN — CYCLOSPORINE 1 DROP: 0.5 EMULSION OPHTHALMIC at 08:00

## 2022-05-25 RX ADMIN — OXYCODONE 5 MG: 5 TABLET ORAL at 05:04

## 2022-05-25 RX ADMIN — VILAZODONE HYDROCHLORIDE 40 MG: 40 TABLET ORAL at 05:04

## 2022-05-25 RX ADMIN — FERROUS SULFATE TAB EC 324 MG (65 MG FE EQUIVALENT) 324 MG: 324 (65 FE) TABLET DELAYED RESPONSE at 08:01

## 2022-05-25 RX ADMIN — SODIUM CHLORIDE 100 ML/HR: 9 INJECTION, SOLUTION INTRAVENOUS at 00:44

## 2022-05-25 RX ADMIN — ACETAMINOPHEN 1000 MG: 500 TABLET ORAL at 00:11

## 2022-05-25 RX ADMIN — LOSARTAN POTASSIUM 100 MG: 50 TABLET, FILM COATED ORAL at 08:01

## 2022-06-01 ENCOUNTER — OFFICE VISIT (OUTPATIENT)
Dept: ORTHOPEDIC SURGERY | Facility: CLINIC | Age: 62
End: 2022-06-01

## 2022-06-01 VITALS
HEIGHT: 65 IN | WEIGHT: 251 LBS | HEART RATE: 89 BPM | SYSTOLIC BLOOD PRESSURE: 152 MMHG | BODY MASS INDEX: 41.82 KG/M2 | DIASTOLIC BLOOD PRESSURE: 93 MMHG

## 2022-06-01 DIAGNOSIS — E66.01 MORBID OBESITY: ICD-10-CM

## 2022-06-01 DIAGNOSIS — Z96.652 HISTORY OF REVISION OF TOTAL REPLACEMENT OF LEFT KNEE JOINT: Primary | ICD-10-CM

## 2022-06-01 PROCEDURE — 99024 POSTOP FOLLOW-UP VISIT: CPT | Performed by: PHYSICIAN ASSISTANT

## 2022-06-01 RX ORDER — OXYCODONE HYDROCHLORIDE AND ACETAMINOPHEN 5; 325 MG/1; MG/1
1-2 TABLET ORAL EVERY 4 HOURS PRN
Qty: 50 TABLET | Refills: 0 | Status: SHIPPED | OUTPATIENT
Start: 2022-06-01 | End: 2022-07-21

## 2022-06-01 NOTE — PATIENT INSTRUCTIONS
Total Knee Replacement, Care After  After the procedure, it is common to have stiffness and discomfort, or redness, pain, and swelling around your cut from surgery (incision). You may also have a small amount of blood or clear fluid coming from your incision.  Follow these instructions at home:  Your doctor may give you more instructions. If you have problems, contact your doctor.  Medicines  Take over-the-counter and prescription medicines only as told by your doctor.  If you were prescribed a blood thinner, take it as told by your doctor.  If told, take steps to prevent problems with pooping (constipation). You may need to:  Drink enough fluid to keep your pee (urine) pale yellow.  Take medicines. You will be told what medicines to take.  Eat foods that are high in fiber. These include beans, whole grains, and fresh fruits and vegetables.  Limit foods that are high in fat and sugar. These include fried or sweet foods.  Incision care    Follow instructions from your doctor about how to take care of your incision. Make sure you:  Wash your hands with soap and water for at least 20 seconds before and after you change your bandage. If you cannot use soap and water, use hand .  Change your bandage.  Leave stitches, staples, or skin glue in place for at least 2 weeks.  Leave tape strips alone unless you are told to take them off. You may trim the edges of the tape strips if they curl up.  Do not take baths, swim, or use a hot tub until your doctor says it is okay.  Check your incision every day for signs of infection. Check for:  More redness, swelling, or pain.  More fluid or blood.  Warmth.  Pus or a bad smell.    Activity  Rest as told by your doctor.  Get up to take short walks every 1 to 2 hours. Ask for help if you feel weak or unsteady.  Follow instructions from your doctor about:  Using a walker, crutches, or a cane.  How much body weight you may safely support on your affected leg (weight-bearing  restrictions).  How to get out of a bed and chair and how to go up and down stairs. A physical therapist will show you how to do this.  Do exercises as told by your doctor or physical therapist.  Avoid activities that put stress on your knees. These include running, jumping rope, and doing jumping jacks.  Do not play contact sports until your doctor says it is okay.  Return to your normal activities when your doctor says that it is safe.  Managing pain, stiffness, and swelling    If told, put ice on your knee. To do this:  Put ice in a plastic bag or use an icing device (cold flow pad). Follow your doctor's directions about how to use the icing device.  Place a towel between your skin and the bag or between your skin and the icing device.  Leave the ice on for 20 minutes, 2-3 times a day.  Take off the ice if your skin turns bright red. This is very important. If you cannot feel pain, heat, or cold, you have a greater risk of damage to the area.  Move your toes often.  Raise your leg above the level of your heart while you are sitting or lying down.  Use a few pillows to keep your leg straight.  Do not put a pillow just under the knee. If the knee is bent for a long time, this may make the knee stiff.  Wear elastic knee support as told by your doctor.    Safety    To help prevent falls:  Keep floors clear of objects you may trip over.  Place items that you may need within easy reach.  Wear an apron or tool belt with pockets for carrying objects. This leaves your hands free to help with your balance.  Do not drive or use machines until your doctor says it is safe.    General instructions  Wear compression stockings as told by your doctor.  Continue with breathing exercises. This helps prevent lung infection.  Do not smoke or use any products that contain nicotine or tobacco. If you need help quitting, ask your doctor.  If you plan to visit a dentist:  Tell your doctor. Ask about things to do before your teeth are  cleaned.  Tell your dentist about your new joint.  Keep all follow-up visits.  Contact a doctor if:  You have a fever or chills.  You have a cough or feel short of breath.  Your medicine is not controlling your pain.  You have any of these signs of infection around your incision:  More redness, swelling, or pain.  More fluid or blood.  Warmth.  Pus or a bad smell.  You fall.  Get help right away if:  You have very bad pain.  You have trouble breathing.  You have chest pain.  You have pain in your calf or leg.  You have redness, swelling, or warmth in your calf or leg.  Your incision breaks open after the stitches or staples are taken out.  These symptoms may be an emergency. Get help right away. Call your local emergency services (911 in the [place]  Do not wait to see if the symptoms will go away.  Do not drive yourself to the hospital.  Summary  After the procedure, it is common to have stiffness and discomfort, or redness, pain, and swelling around your incision.  Follow instructions from your doctor about how to take care of your incision.  Use crutches, a walker, or a cane as told by your doctor.  If you were prescribed a blood thinner, take it as told by your doctor.  Keep all follow-up visits.  This information is not intended to replace advice given to you by your health care provider. Make sure you discuss any questions you have with your health care provider.  Document Revised: 06/08/2021 Document Reviewed: 06/08/2021  [employer] Patient Education © 2021 [employer]

## 2022-06-01 NOTE — PROGRESS NOTES
ORTHO POSTOP VISIT       Subjective:    HPI:  Monica Fitzpatrick is a 61 y.o. female who presents about 1 week out from having a left knee revision.  She reports that she continues to have a moderate amount of pain that is not controlled by her pain medication at times.    Medications:    Current Outpatient Medications:   •  albuterol sulfate  (90 Base) MCG/ACT inhaler, Inhale 2 puffs Every 6 (Six) Hours As Needed., Disp: , Rfl:   •  atorvastatin (LIPITOR) 20 MG tablet, Take 20 mg by mouth Daily., Disp: , Rfl:   •  buPROPion XL (WELLBUTRIN XL) 150 MG 24 hr tablet, Take 1 tablet by mouth Every Morning., Disp: , Rfl:   •  Cyanocobalamin (B-12) 1000 MCG capsule, Take 1,000 mcg by mouth Daily. LD 5/10, Disp: , Rfl:   •  cycloSPORINE (RESTASIS) 0.05 % ophthalmic emulsion, Administer 1 drop to both eyes Every 12 (Twelve) Hours., Disp: , Rfl:   •  Dulaglutide (Trulicity) 1.5 MG/0.5ML solution pen-injector, 1.5 mg 1 (One) Time Per Week. Wed, Disp: , Rfl:   •  fluticasone (FLONASE) 50 MCG/ACT nasal spray, 2 sprays into the nostril(s) as directed by provider Daily., Disp: , Rfl:   •  gabapentin (NEURONTIN) 600 MG tablet, Take 600 mg by mouth 3 (Three) Times a Day., Disp: , Rfl:   •  hydrocortisone 2.5 % cream, Apply 1 application topically to the appropriate area as directed As Needed., Disp: , Rfl:   •  hydrOXYzine (ATARAX) 25 MG tablet, Take 25 mg by mouth Every 8 (Eight) Hours As Needed., Disp: , Rfl:   •  losartan (COZAAR) 100 MG tablet, Take 100 mg by mouth every night at bedtime., Disp: , Rfl:   •  meloxicam (MOBIC) 15 MG tablet, TAKE 1 TABLET BY MOUTH DAILY AS NEEDED FOR JOINT PAIN, Disp: 30 tablet, Rfl: 0  •  multivitamin with minerals tablet tablet, Take 1 tablet by mouth Daily. LD 5/10, Disp: , Rfl:   •  oxyCODONE-acetaminophen (PERCOCET) 5-325 MG per tablet, Take 1-2 tablets by mouth Every 4 (Four) Hours As Needed for Moderate Pain ., Disp: 50 tablet, Rfl: 0  •  rivaroxaban (XARELTO) 10 MG  "tablet, Take 1 tablet by mouth Daily With Dinner., Disp: 12 tablet, Rfl: 0  •  Viibryd 40 MG tablet tablet, Take 40 mg by mouth Every Morning., Disp: , Rfl:   •  vitamin D3 125 MCG (5000 UT) capsule capsule, Take 5,000 Units by mouth Daily. LD 5/12, Disp: , Rfl:   Current outpatient and discharge medications have been reconciled for the patient.  Reviewed by: GRICELDA Luciano      Allergies:  Allergies   Allergen Reactions   • Sulfa Antibiotics Itching, Nausea And Vomiting, Nausea Only and Rash   • Tramadol Itching, Nausea Only, Rash and GI Intolerance          Objective   Objective:    /93 (BP Location: Left arm, Patient Position: Sitting, Cuff Size: Large Adult)   Pulse 89   Ht 165.1 cm (65\")   Wt 114 kg (251 lb)   BMI 41.77 kg/m²     Physical Examination:  Alert, oriented, morbidly obese individual in no acute distress, ambulating with the assistance of a walker  Left lower extremity shows a well-healing surgical incision with staples in place with no erythema, drainage, or open skin lesions. There is a mild amount of swelling. It is grossly well aligned, and the patient is neurovascularly intact distally. There is mild tenderness to palpation and with range of motion.           Imaging:  xrays to be obtained at next visit            Assessment:  1. 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 Morbid obesity (HCC)       About 1 week out from surgery        Plan:  Her incision was cleansed thoroughly with chlorhexidine soap and saline solution and redressed with a bordered Mepilex silver dressing.  This dressing should left in place until patient seen back in 1 week.  She may shower with dressing in place.  Her leg was rewrapped with clean Ace wraps.  I will refill her pain medication at this time, and she may take 1 to 2 tablets every 4 hours as needed to help with her pain.  She is to call me if this does not control her pain.  We will see her back in 1 week with imaging at " "that time.  She should call with any questions or concerns.             GRICELDA Luciano  06/01/22  10:11 EDT    \"Please note that portions of this note were completed with a voice recognition program\".                   "

## 2022-06-03 ENCOUNTER — PATIENT ROUNDING (BHMG ONLY) (OUTPATIENT)
Dept: CARDIOLOGY | Facility: CLINIC | Age: 62
End: 2022-06-03

## 2022-06-03 NOTE — PROGRESS NOTES
Delilah 3, 2022    Hello, may I speak with Monica Fitzpatrick?    My name is Aracely Portillo    I am  with [facility]  [address] [place] [address].    Before we get started may I verify your date of birth? 1960    I am calling to officially welcome you to our practice and ask about your recent visit. Is this a good time to talk? Yes    Tell me about your visit with us. What things went well? Went very well       We're always looking for ways to make our patients' experiences even better. Do you have recommendations on ways we may improve?  no    Overall were you satisfied with your first visit to our practice? yes       I appreciate you taking the time to speak with me today. Is there anything else I can do for you? no      Thank you, and have a great day.

## 2022-06-09 ENCOUNTER — TELEPHONE (OUTPATIENT)
Dept: ORTHOPEDIC SURGERY | Facility: CLINIC | Age: 62
End: 2022-06-09

## 2022-06-09 ENCOUNTER — OFFICE VISIT (OUTPATIENT)
Dept: ORTHOPEDIC SURGERY | Facility: CLINIC | Age: 62
End: 2022-06-09

## 2022-06-09 VITALS
HEIGHT: 65 IN | BODY MASS INDEX: 41.82 KG/M2 | WEIGHT: 251 LBS | DIASTOLIC BLOOD PRESSURE: 85 MMHG | HEART RATE: 89 BPM | SYSTOLIC BLOOD PRESSURE: 136 MMHG

## 2022-06-09 DIAGNOSIS — Z96.652 HISTORY OF REVISION OF TOTAL REPLACEMENT OF LEFT KNEE JOINT: Primary | ICD-10-CM

## 2022-06-09 DIAGNOSIS — E66.01 MORBID OBESITY: ICD-10-CM

## 2022-06-09 PROCEDURE — 99024 POSTOP FOLLOW-UP VISIT: CPT | Performed by: PHYSICIAN ASSISTANT

## 2022-06-09 NOTE — PROGRESS NOTES
ORTHO POSTOP VISIT       Subjective:    HPI:  Monica Fitzpatrick is a 61 y.o. female who presents about 2 weeks out from having a left total knee revision.  She reports that she is doing well with mild intermittent discomfort.  He does state that her pain is controlled at this time.  She states that she is already set up to transition to outpatient physical therapy.    Medications:    Current Outpatient Medications:   •  albuterol sulfate  (90 Base) MCG/ACT inhaler, Inhale 2 puffs Every 6 (Six) Hours As Needed., Disp: , Rfl:   •  atorvastatin (LIPITOR) 20 MG tablet, Take 20 mg by mouth Daily., Disp: , Rfl:   •  buPROPion XL (WELLBUTRIN XL) 150 MG 24 hr tablet, Take 1 tablet by mouth Every Morning., Disp: , Rfl:   •  Cyanocobalamin (B-12) 1000 MCG capsule, Take 1,000 mcg by mouth Daily. LD 5/10, Disp: , Rfl:   •  cycloSPORINE (RESTASIS) 0.05 % ophthalmic emulsion, Administer 1 drop to both eyes Every 12 (Twelve) Hours., Disp: , Rfl:   •  Dulaglutide (Trulicity) 1.5 MG/0.5ML solution pen-injector, 1.5 mg 1 (One) Time Per Week. Wed, Disp: , Rfl:   •  fluticasone (FLONASE) 50 MCG/ACT nasal spray, 2 sprays into the nostril(s) as directed by provider Daily., Disp: , Rfl:   •  gabapentin (NEURONTIN) 600 MG tablet, Take 600 mg by mouth 3 (Three) Times a Day., Disp: , Rfl:   •  hydrocortisone 2.5 % cream, Apply 1 application topically to the appropriate area as directed As Needed., Disp: , Rfl:   •  hydrOXYzine (ATARAX) 25 MG tablet, Take 25 mg by mouth Every 8 (Eight) Hours As Needed., Disp: , Rfl:   •  losartan (COZAAR) 100 MG tablet, Take 100 mg by mouth every night at bedtime., Disp: , Rfl:   •  meloxicam (MOBIC) 15 MG tablet, TAKE 1 TABLET BY MOUTH DAILY AS NEEDED FOR JOINT PAIN, Disp: 30 tablet, Rfl: 0  •  multivitamin with minerals tablet tablet, Take 1 tablet by mouth Daily. LD 5/10, Disp: , Rfl:   •  oxyCODONE-acetaminophen (PERCOCET) 5-325 MG per tablet, Take 1-2 tablets by mouth Every 4 (Four)  "Hours As Needed for Moderate Pain ., Disp: 50 tablet, Rfl: 0  •  Viibryd 40 MG tablet tablet, Take 40 mg by mouth Every Morning., Disp: , Rfl:   •  vitamin D3 125 MCG (5000 UT) capsule capsule, Take 5,000 Units by mouth Daily. LD 5/12, Disp: , Rfl:   Current outpatient and discharge medications have been reconciled for the patient.  Reviewed by: GRICELDA Luciano      Allergies:  Allergies   Allergen Reactions   • Sulfa Antibiotics Itching, Nausea And Vomiting, Nausea Only and Rash   • Tramadol Itching, Nausea Only, Rash and GI Intolerance          Objective   Objective:    /85 (BP Location: Right arm, Patient Position: Sitting, Cuff Size: Large Adult)   Pulse 89   Ht 165.1 cm (65\")   Wt 114 kg (251 lb)   BMI 41.77 kg/m²     Physical Examination:  Alert, oriented, morbidly obese individual in no acute distress, ambulating with the assistance of a walker  Left lower extremity shows a well-healing surgical incision with no erythema, drainage, or open skin lesions.  There are multiple small scabs present along the incision.  There is a mild amount of swelling. It is grossly well aligned, and the patient is neurovascularly intact distally. The knee is stable to varus and valgus stress, there is no patellar maltracking or crepitus noted, and plantar and dorsiflexion is 5/5. There is mild tenderness to palpation and with range of motion, which is about 0-80.           Imaging:  xrays obtained today  left Knee X-Ray    Date of exam: 6/9/2022    Indication: 2 weeks status post left total knee revision    AP, Lateral, [place] views    Findings:A well positioned knee replacement, longstem revision components, without evidence of bony or implant failure. and No fractures or dislocations are appreciated    within normal limits joint spaces    Hardware appropriately positioned yes    yes prior studies available for comparison.    This patient's x-ray report was graded according to the Kellgren and Fam " "classification.  This took into account the joint space narrowing, osteophyte formation, sclerosis of the distal femur/proximal tibia along with deformity of those bones.  The findings were indicative of K L grade na.    X-RAY was ordered and reviewed by GRICELDA Luciano            Assessment:  1. 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 Morbid obesity (HCC)       2 weeks out from surgery        Plan:  At this time, we will plan to see the patient back in about 6 weeks. The patient should continue PT, WBAT, and call with any problems.               GRICELDA Luciano  06/09/22  10:59 EDT    \"Please note that portions of this note were completed with a voice recognition program\".                   "

## 2022-06-09 NOTE — TELEPHONE ENCOUNTER
[facility] [phone] CALLED IN REQUESTING A PT ORDER BE SENT ([phone]. PLEASE ADVISE.

## 2022-07-21 ENCOUNTER — OFFICE VISIT (OUTPATIENT)
Dept: ORTHOPEDIC SURGERY | Facility: CLINIC | Age: 62
End: 2022-07-21

## 2022-07-21 VITALS
SYSTOLIC BLOOD PRESSURE: 140 MMHG | HEIGHT: 65 IN | DIASTOLIC BLOOD PRESSURE: 90 MMHG | HEART RATE: 78 BPM | BODY MASS INDEX: 40.82 KG/M2 | WEIGHT: 245 LBS

## 2022-07-21 DIAGNOSIS — E66.01 MORBID OBESITY: ICD-10-CM

## 2022-07-21 DIAGNOSIS — Z96.652 HISTORY OF REVISION OF TOTAL REPLACEMENT OF LEFT KNEE JOINT: Primary | ICD-10-CM

## 2022-07-21 PROCEDURE — 99024 POSTOP FOLLOW-UP VISIT: CPT | Performed by: PHYSICIAN ASSISTANT

## 2022-07-21 NOTE — PROGRESS NOTES
ORTHO POSTOP VISIT       Subjective:    HPI:  Monica Fitzpatrick is a 61 y.o. female who presents about 8 weeks out from having a left total knee revision.  She reports that she is doing well with little to no pain in the knee area.  She does report that she is working hard in therapy and continues to make progress.    Medications:    Current Outpatient Medications:   •  albuterol sulfate  (90 Base) MCG/ACT inhaler, Inhale 2 puffs Every 6 (Six) Hours As Needed., Disp: , Rfl:   •  atorvastatin (LIPITOR) 20 MG tablet, Take 20 mg by mouth Daily., Disp: , Rfl:   •  buPROPion XL (WELLBUTRIN XL) 150 MG 24 hr tablet, Take 1 tablet by mouth Every Morning., Disp: , Rfl:   •  Cyanocobalamin (B-12) 1000 MCG capsule, Take 1,000 mcg by mouth Daily. LD 5/10, Disp: , Rfl:   •  cycloSPORINE (RESTASIS) 0.05 % ophthalmic emulsion, Administer 1 drop to both eyes Every 12 (Twelve) Hours., Disp: , Rfl:   •  Dulaglutide (Trulicity) 1.5 MG/0.5ML solution pen-injector, 1.5 mg 1 (One) Time Per Week. Wed, Disp: , Rfl:   •  fluticasone (FLONASE) 50 MCG/ACT nasal spray, 2 sprays into the nostril(s) as directed by provider Daily., Disp: , Rfl:   •  gabapentin (NEURONTIN) 600 MG tablet, Take 600 mg by mouth 3 (Three) Times a Day., Disp: , Rfl:   •  hydrocortisone 2.5 % cream, Apply 1 application topically to the appropriate area as directed As Needed., Disp: , Rfl:   •  hydrOXYzine (ATARAX) 25 MG tablet, Take 25 mg by mouth Every 8 (Eight) Hours As Needed., Disp: , Rfl:   •  losartan (COZAAR) 100 MG tablet, Take 100 mg by mouth every night at bedtime., Disp: , Rfl:   •  multivitamin with minerals tablet tablet, Take 1 tablet by mouth Daily. LD 5/10, Disp: , Rfl:   •  Viibryd 40 MG tablet tablet, Take 40 mg by mouth Every Morning., Disp: , Rfl:   •  vitamin D3 125 MCG (5000 UT) capsule capsule, Take 5,000 Units by mouth Daily. LD 5/12, Disp: , Rfl:   Current outpatient and discharge medications have been reconciled for the  "patient.  Reviewed by: GRICELDA Luciano      Allergies:  Allergies   Allergen Reactions   • Sulfa Antibiotics Itching, Nausea And Vomiting, Nausea Only and Rash   • Tramadol Itching, Nausea Only, Rash and GI Intolerance          Objective   Objective:    /90 (BP Location: Left arm, Patient Position: Sitting, Cuff Size: Large Adult)   Pulse 78   Ht 165.1 cm (65\")   Wt 111 kg (245 lb)   BMI 40.77 kg/m²     Physical Examination:  Alert, oriented, morbidly obese individual in no acute distress, ambulating with the assistance of a walker  Left lower extremity shows a well-healed surgical incision with no erythema, drainage, or open skin lesions. There is a mild amount of swelling. It is grossly well aligned, and the patient is neurovascularly intact distally. The knee is stable to varus and valgus stress, there is no patellar maltracking or crepitus noted, and plantar and dorsiflexion is 5/5. There is  no tenderness to palpation or with range of motion, which is about 2-88 degrees of flexion.         Imaging:  no diagnostic testing performed this visit            Assessment:  1. 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 Morbid obesity (HCC)       About 8 weeks out from surgery        Plan:  Continue aggressive physical therapy.  Follow-up with Dr. Coles in 4 weeks for recheck.  , GI, and dental antibiotic prophylaxis discussed.  She should call with any questions or concerns.             GRICELDA Luciano  07/21/22  10:59 EDT    \"Please note that portions of this note were completed with a voice recognition program\".                   "

## 2022-07-27 ENCOUNTER — TELEPHONE (OUTPATIENT)
Dept: ORTHOPEDIC SURGERY | Facility: CLINIC | Age: 62
End: 2022-07-27

## 2022-07-27 NOTE — TELEPHONE ENCOUNTER
ELSI PATIENT: TKA LEFT 05 24 22 07/27/2022 PATIENT SX WAS 05 24 22 WITH DR CALZADA - PATIENT IS HAVING TROUBLE WITH THE KNEE: IS I THERAPY - BUT THERAPIST SAID FOR HER TO FOLLOW UP WITH ORTHO FOR APPTMT OR MED ADVICE.  KNEE IS POPPING.  PATIENT ADVISES SHE DOESN'T WANT ANY PAIN MEDS....WAS GOING TO SCHEDULE FOLLOW UP BUT, NEXT IS 08 03 22  THEN GOES OUT TO 09 02 22.  PATIENT SAID NEEDS 4 WEEK FOLLOW UP JUST SAW ON 07 21 22.  HUB ADVISED PATIENT WOULD NOTIFY CLINICAL TO GIVE HER A CALL FOR CLINICAL ADVICE OR SEE IF CAN GET HER IN SOONER THAN 09 02.

## 2022-07-27 NOTE — TELEPHONE ENCOUNTER
Please contact patient to set up appt with Dr. Coles for issues having with operative knee; surgery was 5/24/2022. Thank you.

## 2022-08-03 ENCOUNTER — OFFICE VISIT (OUTPATIENT)
Dept: ORTHOPEDIC SURGERY | Facility: CLINIC | Age: 62
End: 2022-08-03

## 2022-08-03 DIAGNOSIS — Z96.652 HISTORY OF REVISION OF TOTAL REPLACEMENT OF LEFT KNEE JOINT: Primary | ICD-10-CM

## 2022-08-03 DIAGNOSIS — R52 INCREASED PAIN: ICD-10-CM

## 2022-08-03 PROCEDURE — 99024 POSTOP FOLLOW-UP VISIT: CPT | Performed by: ORTHOPAEDIC SURGERY

## 2022-08-03 NOTE — PROGRESS NOTES
Chief Complaint  Follow-up of the Left Knee    Subjective    History of Present Illness      Monica Fitzpatrick is a 61 y.o. female who presents to [facility] for follow-up on left total knee arthroplasty revision surgery.  History of Present Illness the patient has done reasonably well after knee replacement revision surgery.  I performed the surgery in May 2022.  She is a little disappointed because she is starting to develop some arthrofibrosis.  She did have a very good range of motion immediately postoperatively but subsequently has now started to lose some range of motion.  She is able to flex to about 90 degrees with some difficulty.  We have discussed the role of manipulation of the knee postoperatively.  We have also discussed about the use of Kinesiotape in the setting of swelling to reduce the swelling and improve the mobility.  Pain Location:  LEFT knee  Radiation: none  Quality: dull  Intensity/Severity: moderate  Duration: 2 months  Progression of symptoms: no worsening, symptoms stable/unchanged  Onset quality: gradual   Timing: intermittent  Aggravating Factors: squatting  Alleviating Factors: Tylenol  Previous Episodes: yes  Associated Symptoms: swelling  ADLs Affected: ambulating  Previous Treatment: prior surgery       Objective   Vital Signs:   There were no vitals taken for this visit.    Physical Exam  Physical Exam  Vitals signs and nursing note reviewed.   Constitutional:       Appearance: Normal appearance.   Pulmonary:      Effort: Pulmonary effort is normal.   Skin:     General: Skin is warm and dry.      Capillary Refill: Capillary refill takes less than 2 seconds.   Neurological:      General: No focal deficit present.      Mental Status: He is alert and oriented to person, place, and time. Mental status is at baseline.   Psychiatric:         Mood and Affect: Mood normal.         Behavior: Behavior normal.         Thought Content: Thought content  normal.         Judgment: Judgment normal.     Ortho Exam     Left knee.The patient is status post total knee revision arthroplasty postoperative 9 week(s). Incision is clean. Calf is soft and nontender. Homans sign is negative. There is no clicking, popping or catching. Anterior and posterior drawer signs are negative.  There is no instability of the components. Appropriate amounts of swelling and bruising are noted. Dorsalis pedis and posterior tibial artery pulses are palpable. Common peroneal nerve function is well preserved. Range of motion is from 0-95 degrees of flexion. Gait is cautious but otherwise fairly normal. There is no evidence of a deep seated joint infection.          Result Review :   The following data was reviewed by: Hank Coles MD on 08/03/2022:  Radiologic studies - see below for interpretation  LEFT knee xrays  weightbearing/standing ap/lateral views were performed at [facility] on 08/03/2022. Images were independently viewed and interpreted by myself, my impression as follows:    left Knee X-Ray  Indication: Evaluation of implant position after revision knee arthroplasty.  AP, Lateral views  Findings: Excellent position of the implants with a good cement mantle without any subsidence or osteolysis.  no bony lesion  Soft tissues within normal limits  within normal limits joint spaces  Hardware appropriately positioned yes      yes prior studies available for comparison.        X-RAY was ordered and reviewed by Hank Coles MD        Procedures           Assessment   Assessment and Plan    Diagnoses and all orders for this visit:    1. 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 Increased pain  -     XR Knee 3 View Left          Follow Up   · Kinesiotape to the knee to help reduce the swelling and to improve the flexion.  · We have discussed possible manipulation with the patient and her  and she will let me know if she  wants to proceed with that form of intervention.  · Continue with aggressive physical therapy.  · , GI and dental procedure prophylaxis with antibiotics to prevent metastatic infection of the knee arthroplasty implants.  · Rest, ice, compression, and elevation (RICE) therapy  · Stretching and strengthening exercises of the quads and hamstrings.  · OTC Tylenol 500-1000mg by mouth every 6 hours as needed for pain   · Follow up in 2 month(s)  • Patient was given instructions and counseling regarding her condition or for health maintenance advice. Please see specific information pulled into the AVS if appropriate.     Hank Coles MD   Date of Encounter: 8/3/2022        EMR Dragon/Transcription disclaimer:  Much of this encounter note is an electronic transcription/translation of spoken language to printed text. The electronic translation of spoken language may permit erroneous, or at times, nonsensical words or phrases to be inadvertently transcribed; Although I have reviewed the note for such errors, some may still exist.

## 2022-08-08 ENCOUNTER — TELEPHONE (OUTPATIENT)
Dept: ORTHOPEDIC SURGERY | Facility: CLINIC | Age: 62
End: 2022-08-08

## 2022-08-08 NOTE — TELEPHONE ENCOUNTER
Caller: BELIA  Relationship to Patient: SELF    Phone Number: [phone]    Reason for Call: PT CALLED STATING THAT SHE JUST HAD RECENT SURGERY BY DR CALZADA AND THEY DISCUSSED SOME MANIPULATION. PT WOULD LIKE TO MOVE FORWARD WITH THIS. PLEASE ADVISE AT ABOVE PHONE NUMBER

## 2022-08-11 ENCOUNTER — HOSPITAL ENCOUNTER (OUTPATIENT)
Dept: CARDIOLOGY | Facility: HOSPITAL | Age: 62
Discharge: HOME | End: 2022-08-11

## 2022-08-11 ENCOUNTER — LAB (OUTPATIENT)
Dept: LAB | Facility: HOSPITAL | Age: 62
End: 2022-08-11

## 2022-08-11 LAB
ANION GAP SERPL CALCULATED.3IONS-SCNC: 8.8 MMOL/L (ref 5–15)
BUN SERPL-MCNC: 8 MG/DL (ref 6–20)
BUN/CREAT SERPL: 9.4 (ref 7–25)
CALCIUM SPEC-SCNC: 9.3 MG/DL (ref 8.6–10.5)
CHLORIDE SERPL-SCNC: 103 MMOL/L (ref 98–107)
CO2 SERPL-SCNC: 29.2 MMOL/L (ref 22–29)
CREAT SERPL-MCNC: 0.85 MG/DL (ref 0.57–1)
DEPRECATED RDW RBC AUTO: 39.6 FL (ref 37–54)
EGFRCR SERPLBLD CKD-EPI 2021: 79 ML/MIN/1.73
ERYTHROCYTE [DISTWIDTH] IN BLOOD BY AUTOMATED COUNT: 13.6 % (ref 12.3–15.4)
GLUCOSE SERPL-MCNC: 88 MG/DL (ref 65–99)
HCT VFR BLD AUTO: 41.6 % (ref 34–46.6)
HGB BLD-MCNC: 13.2 G/DL (ref 12–15.9)
MCH RBC QN AUTO: 26 PG (ref 26.6–33)
MCHC RBC AUTO-ENTMCNC: 31.7 G/DL (ref 31.5–35.7)
MCV RBC AUTO: 82.1 FL (ref 79–97)
PLATELET # BLD AUTO: 257 10*3/MM3 (ref 140–450)
PMV BLD AUTO: 11.7 FL (ref 6–12)
POTASSIUM SERPL-SCNC: 3.2 MMOL/L (ref 3.5–5.2)
QT INTERVAL: 475 MS
RBC # BLD AUTO: 5.07 10*6/MM3 (ref 3.77–5.28)
SODIUM SERPL-SCNC: 141 MMOL/L (ref 136–145)
WBC NRBC COR # BLD: 5.94 10*3/MM3 (ref 3.4–10.8)

## 2022-08-11 PROCEDURE — 93005 ELECTROCARDIOGRAM TRACING: CPT

## 2022-08-11 PROCEDURE — 80048 BASIC METABOLIC PNL TOTAL CA: CPT | Performed by: ORTHOPAEDIC SURGERY

## 2022-08-11 PROCEDURE — 85027 COMPLETE CBC AUTOMATED: CPT | Performed by: ORTHOPAEDIC SURGERY

## 2022-08-11 PROCEDURE — 93010 ELECTROCARDIOGRAM REPORT: CPT | Performed by: INTERNAL MEDICINE

## 2022-08-11 PROCEDURE — 36415 COLL VENOUS BLD VENIPUNCTURE: CPT | Performed by: ORTHOPAEDIC SURGERY

## 2022-08-12 NOTE — PAT
Patient notified of low K+ of 3.2, encouraged to increase foods high in K+ into diet, verbalized understanding.  Will repeat K+ day of surgery.

## 2022-08-13 ENCOUNTER — LAB (OUTPATIENT)
Dept: LAB | Facility: HOSPITAL | Age: 62
End: 2022-08-13

## 2022-08-13 DIAGNOSIS — Z96.652 HISTORY OF REVISION OF TOTAL REPLACEMENT OF LEFT KNEE JOINT: ICD-10-CM

## 2022-08-13 LAB
POTASSIUM SERPL-SCNC: 3.6 MMOL/L (ref 3.5–5.2)
SARS-COV-2 ORF1AB RESP QL NAA+PROBE: NOT DETECTED

## 2022-08-13 PROCEDURE — U0004 COV-19 TEST NON-CDC HGH THRU: HCPCS

## 2022-08-13 PROCEDURE — C9803 HOPD COVID-19 SPEC COLLECT: HCPCS

## 2022-08-13 PROCEDURE — 84132 ASSAY OF SERUM POTASSIUM: CPT | Performed by: ORTHOPAEDIC SURGERY

## 2022-08-15 ENCOUNTER — ANESTHESIA EVENT (OUTPATIENT)
Dept: PERIOP | Facility: HOSPITAL | Age: 62
End: 2022-08-15

## 2022-08-15 NOTE — ANESTHESIA PREPROCEDURE EVALUATION
Anesthesia Evaluation     Patient summary reviewed and Nursing notes reviewed   history of anesthetic complications: PONV  NPO Solid Status: > 8 hours  NPO Liquid Status: > 8 hours           Airway   Dental      Pulmonary    Cardiovascular     ECG reviewed    (+) hypertension, hyperlipidemia,       Neuro/Psych  (+) numbness, psychiatric history Anxiety and Depression,    GI/Hepatic/Renal/Endo    (+) morbid obesity,  diabetes mellitus,     Musculoskeletal     (+) back pain, myalgias, radiculopathy  Abdominal    Substance History      OB/GYN          Other   arthritis,      ROS/Med Hx Other: RBBB, dilation aortic root, dry eyes, spondylosis, DDD, fibromyalgia, low vit D, h/o hypokalemia    Echocardiogram Findings    Left Ventricle Calculated left ventricular EF = 55% Left ventricular systolic function is normal.   Normal left ventricular cavity size and wall thickness noted. All left ventricular wall segments contract normally. Left ventricular diastolic function is consistent with (grade II w/high LAP) pseudonormalization. Normal left atrial pressure. No evidence of left ventricular thrombus or mass present.  Right Ventricle Normal right ventricular cavity size and systolic function noted.  Left Atrium Normal left atrial cavity size noted.  Right Atrium Normal right atrial cavity size noted.  Aortic Valve The aortic valve is structurally normal with no regurgitation or stenosis present.  Mitral Valve The mitral valve is structurally normal with no significant stenosis present. No significant mitral valve regurgitation is present.  Tricuspid Valve The tricuspid valve is structurally normal with no significant regurgitation or significant stenosis present.  Pulmonic Valve The pulmonic valve is not well visualized.  Greater Vessels Mild dilation of the aortic root is present.  Pericardium There is no evidence of pericardial effusion. .    Stress  · Left ventricular ejection fraction is hyperdynamic (Calculated EF >  70%). .  · Myocardial perfusion imaging indicates a normal myocardial perfusion study with no evidence of ischemia.  · Impressions are consistent with a low risk study.  · This is normal Cardiolite imaging stress test with no evidence of ischemia or myocardial infarction. Left ventricle size and function is normal on gated SPECT imaging. No wall motion abnormality was noted. Clinical correlation recommended. Further recommendation as per ordering physician..  · Findings consistent with a normal ECG stress test.        PSH  REPLACEMENT TOTAL KNEE TOTAL KNEE ARTHROPLASTY  WRIST SURGERY TOTAL KNEE ARTHROPLASTY REVISION  COLONOSCOPY ENDOSCOPY                Anesthesia Plan    ASA 3     regional     (Patient identified; pre-operative vital signs, all relevant labs/studies, complete medical/surgical/anesthetic history, full medication list, full allergy list, and NPO status obtained/reviewed; physical assessment performed; anesthetic options, side effects, potential complications, risks, and benefits discussed; questions answered; written anesthesia consent obtained; patient cleared for procedure; anesthesia machine and equipment checked and functioning)    Anesthetic plan, risks, benefits, and alternatives have been provided, discussed and informed consent has been obtained with: patient.    Plan discussed with CRNA and CAA.        CODE STATUS:

## 2022-08-16 ENCOUNTER — HOSPITAL ENCOUNTER (OUTPATIENT)
Facility: HOSPITAL | Age: 62
Discharge: HOME | End: 2022-08-16
Attending: ORTHOPAEDIC SURGERY | Admitting: ORTHOPAEDIC SURGERY

## 2022-08-16 ENCOUNTER — ANESTHESIA (OUTPATIENT)
Dept: PERIOP | Facility: HOSPITAL | Age: 62
End: 2022-08-16

## 2022-08-16 VITALS
BODY MASS INDEX: 38.89 KG/M2 | TEMPERATURE: 97.3 F | RESPIRATION RATE: 14 BRPM | HEIGHT: 66 IN | SYSTOLIC BLOOD PRESSURE: 156 MMHG | DIASTOLIC BLOOD PRESSURE: 80 MMHG | HEART RATE: 60 BPM | OXYGEN SATURATION: 96 % | WEIGHT: 242 LBS

## 2022-08-16 LAB — GLUCOSE BLDC GLUCOMTR-MCNC: 125 MG/DL (ref 70–105)

## 2022-08-16 PROCEDURE — 25010000002 DEXAMETHASONE PER 1 MG: Performed by: ANESTHESIOLOGY

## 2022-08-16 PROCEDURE — 82962 GLUCOSE BLOOD TEST: CPT

## 2022-08-16 PROCEDURE — 25010000002 ROPIVACAINE PER 1 MG: Performed by: ANESTHESIOLOGY

## 2022-08-16 PROCEDURE — S0260 H&P FOR SURGERY: HCPCS | Performed by: ORTHOPAEDIC SURGERY

## 2022-08-16 PROCEDURE — 25010000002 FENTANYL CITRATE (PF) 50 MCG/ML SOLUTION: Performed by: NURSE ANESTHETIST, CERTIFIED REGISTERED

## 2022-08-16 PROCEDURE — G0378 HOSPITAL OBSERVATION PER HR: HCPCS

## 2022-08-16 PROCEDURE — 27570 FIXATION OF KNEE JOINT: CPT | Performed by: ORTHOPAEDIC SURGERY

## 2022-08-16 PROCEDURE — 76942 ECHO GUIDE FOR BIOPSY: CPT | Performed by: ORTHOPAEDIC SURGERY

## 2022-08-16 PROCEDURE — 25010000002 KETOROLAC TROMETHAMINE PER 15 MG: Performed by: ANESTHESIOLOGY

## 2022-08-16 PROCEDURE — 25010000002 PROPOFOL 10 MG/ML EMULSION: Performed by: NURSE ANESTHETIST, CERTIFIED REGISTERED

## 2022-08-16 RX ORDER — SODIUM CHLORIDE, SODIUM LACTATE, POTASSIUM CHLORIDE, CALCIUM CHLORIDE 600; 310; 30; 20 MG/100ML; MG/100ML; MG/100ML; MG/100ML
INJECTION, SOLUTION INTRAVENOUS CONTINUOUS PRN
Status: DISCONTINUED | OUTPATIENT
Start: 2022-08-16 | End: 2022-08-16 | Stop reason: SURG

## 2022-08-16 RX ORDER — KETOROLAC TROMETHAMINE 30 MG/ML
30 INJECTION, SOLUTION INTRAMUSCULAR; INTRAVENOUS ONCE AS NEEDED
Status: COMPLETED | OUTPATIENT
Start: 2022-08-16 | End: 2022-08-16

## 2022-08-16 RX ORDER — SODIUM CHLORIDE, SODIUM LACTATE, POTASSIUM CHLORIDE, CALCIUM CHLORIDE 600; 310; 30; 20 MG/100ML; MG/100ML; MG/100ML; MG/100ML
9 INJECTION, SOLUTION INTRAVENOUS CONTINUOUS PRN
Status: DISCONTINUED | OUTPATIENT
Start: 2022-08-16 | End: 2022-08-16 | Stop reason: HOSPADM

## 2022-08-16 RX ORDER — SODIUM CHLORIDE 0.9 % (FLUSH) 0.9 %
10 SYRINGE (ML) INJECTION AS NEEDED
Status: DISCONTINUED | OUTPATIENT
Start: 2022-08-16 | End: 2022-08-16 | Stop reason: HOSPADM

## 2022-08-16 RX ORDER — FENTANYL CITRATE 50 UG/ML
25 INJECTION, SOLUTION INTRAMUSCULAR; INTRAVENOUS
Status: DISCONTINUED | OUTPATIENT
Start: 2022-08-16 | End: 2022-08-16 | Stop reason: HOSPADM

## 2022-08-16 RX ORDER — ROPIVACAINE HYDROCHLORIDE 5 MG/ML
INJECTION, SOLUTION EPIDURAL; INFILTRATION; PERINEURAL
Status: DISCONTINUED | OUTPATIENT
Start: 2022-08-16 | End: 2022-08-16 | Stop reason: SURG

## 2022-08-16 RX ORDER — DEXAMETHASONE SODIUM PHOSPHATE 4 MG/ML
INJECTION, SOLUTION INTRA-ARTICULAR; INTRALESIONAL; INTRAMUSCULAR; INTRAVENOUS; SOFT TISSUE
Status: DISCONTINUED | OUTPATIENT
Start: 2022-08-16 | End: 2022-08-16 | Stop reason: SURG

## 2022-08-16 RX ORDER — SODIUM CHLORIDE 0.9 % (FLUSH) 0.9 %
10 SYRINGE (ML) INJECTION EVERY 12 HOURS SCHEDULED
Status: DISCONTINUED | OUTPATIENT
Start: 2022-08-16 | End: 2022-08-16 | Stop reason: HOSPADM

## 2022-08-16 RX ORDER — MIDAZOLAM HYDROCHLORIDE 1 MG/ML
1 INJECTION INTRAMUSCULAR; INTRAVENOUS
Status: DISCONTINUED | OUTPATIENT
Start: 2022-08-16 | End: 2022-08-16 | Stop reason: HOSPADM

## 2022-08-16 RX ORDER — PROPOFOL 10 MG/ML
VIAL (ML) INTRAVENOUS AS NEEDED
Status: DISCONTINUED | OUTPATIENT
Start: 2022-08-16 | End: 2022-08-16 | Stop reason: SURG

## 2022-08-16 RX ADMIN — SODIUM CHLORIDE, SODIUM LACTATE, POTASSIUM CHLORIDE, AND CALCIUM CHLORIDE: .6; .31; .03; .02 INJECTION, SOLUTION INTRAVENOUS at 07:22

## 2022-08-16 RX ADMIN — SODIUM CHLORIDE, SODIUM LACTATE, POTASSIUM CHLORIDE, AND CALCIUM CHLORIDE 9 ML/HR: .6; .31; .03; .02 INJECTION, SOLUTION INTRAVENOUS at 06:29

## 2022-08-16 RX ADMIN — KETOROLAC TROMETHAMINE 30 MG: 30 INJECTION, SOLUTION INTRAMUSCULAR; INTRAVENOUS at 07:58

## 2022-08-16 RX ADMIN — PROPOFOL 80 MG: 10 INJECTION, EMULSION INTRAVENOUS at 07:28

## 2022-08-16 RX ADMIN — DEXAMETHASONE SODIUM PHOSPHATE 4 MG: 4 INJECTION, SOLUTION INTRAMUSCULAR; INTRAVENOUS at 07:10

## 2022-08-16 RX ADMIN — FENTANYL CITRATE 25 MCG: 50 INJECTION, SOLUTION INTRAMUSCULAR; INTRAVENOUS at 07:45

## 2022-08-16 RX ADMIN — ROPIVACAINE HYDROCHLORIDE 30 ML: 5 INJECTION EPIDURAL; INFILTRATION; PERINEURAL at 07:10

## 2022-08-16 NOTE — ANESTHESIA POSTPROCEDURE EVALUATION
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        Post Anesthesia Care and Evaluation    Patient location during evaluation: PACU  Patient participation: complete - patient participated  Level of consciousness: awake  Pain scale: See nurse's notes for pain score.  Pain management: adequate    Airway patency: patent  Anesthetic complications: No anesthetic complications  PONV Status: none  Cardiovascular status: acceptable  Respiratory status: acceptable and spontaneous ventilation  Hydration status: acceptable    Comments: Patient seen and examined postoperatively; vital signs stable; SpO2 greater than or equal to 90%; cardiopulmonary status stable; nausea/vomiting adequately controlled; pain adequately controlled; no apparent anesthesia complications; patient discharged from anesthesia care when discharge criteria were met

## 2022-08-16 NOTE — H&P
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 Onset of symptoms was gradual and slowly progressive following a revision left knee arthroplasty performed on 24 May 2022.  Symptoms are associated with pain with limited range of motion.  Symptoms are aggravated by flexion of the knee.   Symptoms improve with physical therapy. Patient is now being admitted to the services of Hank Coles MD for further evaluation and treatment.        Allergies   Allergen Reactions   • Sulfa Antibiotics Itching, Nausea And Vomiting, Nausea Only and Rash   • Tramadol Itching, Nausea Only, Rash and GI Intolerance         Home Medications:  Medications Prior to Admission   Medication Sig Dispense Refill Last Dose   • albuterol sulfate  (90 Base) MCG/ACT inhaler Inhale 2 puffs Every 6 (Six) Hours As Needed.   8/15/2022 at Unknown time   • atorvastatin (LIPITOR) 20 MG tablet Take 20 mg by mouth Every Night.   8/15/2022 at Unknown time   • buPROPion XL (WELLBUTRIN XL) 150 MG 24 hr tablet Take 1 tablet by mouth Every Morning.   8/15/2022 at Unknown time   • Cyanocobalamin (B-12) 1000 MCG capsule Take 1,000 mcg by mouth Daily. LD 8/10   Past Week at Unknown time   • cycloSPORINE (RESTASIS) 0.05 % ophthalmic emulsion Administer 1 drop to both eyes Every 12 (Twelve) Hours.   Past Week at Unknown time   • Dulaglutide (Trulicity) 1.5 MG/0.5ML solution pen-injector 1.5 mg 1 (One) Time Per Week. Wed   Past Week at Unknown time   • fluticasone (FLONASE) 50 MCG/ACT nasal spray 2 sprays into the nostril(s) as directed by provider Daily.   8/15/2022 at Unknown  time   • gabapentin (NEURONTIN) 600 MG tablet Take 600 mg by mouth 3 (Three) Times a Day.   8/15/2022 at Unknown time   • hydrOXYzine (ATARAX) 25 MG tablet Take 25 mg by mouth Every 8 (Eight) Hours As Needed.   Past Month at Unknown time   • losartan (COZAAR) 100 MG tablet Take 100 mg by mouth every night at bedtime.   8/15/2022 at Unknown time   • multivitamin with minerals tablet tablet Take 1 tablet by mouth Daily. LD 8/11   Past Week at Unknown time   • Viibryd 40 MG tablet tablet Take 40 mg by mouth Every Morning.   8/15/2022 at Unknown time   • vitamin D3 125 MCG (5000 UT) capsule capsule Take 5,000 Units by mouth Daily. LD 5/12   Past Week at Unknown time   • hydrocortisone 2.5 % cream Apply 1 application topically to the appropriate area as directed As Needed.          Current Medications:  Scheduled Meds:sodium chloride, 10 mL, Intravenous, Q12H      Continuous Infusions:lactated ringers, 9 mL/hr, Last Rate: 9 mL/hr (08/16/22 0629)      PRN Meds:.•  lactated ringers  •  midazolam  •  sodium chloride       Past Medical History:   Diagnosis Date   • Anxiety and depression    • Diabetes (HCC)    • Dry eye    • Hyperlipidemia    • Hypertension    • PONV (postoperative nausea and vomiting)         Past Surgical History:   Procedure Laterality Date   • COLONOSCOPY     • ENDOSCOPY     • REPLACEMENT TOTAL KNEE Left 2014 [place]   • TOTAL KNEE ARTHROPLASTY Right    • TOTAL KNEE ARTHROPLASTY REVISION Left 05/24/2022    Procedure: TOTAL KNEE ARTHROPLASTY REVISION;  Surgeon: Hank Coles MD;  Location: [facility] MAIN OR;  Service: Orthopedics;  Laterality: Left;   • WRIST SURGERY Bilateral     plate and screws        Social History     Occupational History   • Not on file   Tobacco Use   • Smoking status: Never Smoker   • Smokeless tobacco: Never Used   Vaping Use   • Vaping Use: Never used   Substance and Sexual Activity   • Alcohol use: Never   • Drug use: Never   • Sexual activity: Defer      Social History      Social History Narrative   • Not on file        Family History   Problem Relation Age of Onset   • Diabetes Mother    • Heart attack Father    • Heart disease Father    • Diabetes Father    • Alcohol abuse Father    • Drug abuse Sister    • Alcohol abuse Brother    • Diabetes Maternal Grandmother          Review of Systems:   HEENT: Patient denies any headaches, vision changes, change in hearing, or tinnitus, Patient denies any rhinorrhea,epistaxis, sinus pain, mouth or dental problems, sore throat or hoarseness, or dysphagia  Pulmonary: Patient denies any cough, congestion, SOA, or wheezing  Cardiovascular: Patient denies any chest pain, dyspnea, palpitations, weakness, intolerance of exercise, varicosities, swelling of extremities, known murmur  Gastrointestinal:  Patient denies nausea, vomiting, diarrhea, constipation, loss  of appetite, change in appetite, dysphagia, gas, heartburn, melena, change in bowel habits, use of laxatives or other drugs to alter the function of the gastrointestinal tract.  Genital/Urinary: Patient denies dysuria, change in color of urine, change in frequency of urination, pain with urgency, incontinence, retention, or nocturia.  Musculoskeletal: Patient denies increased warmth; redness; or swelling of joints; limitation of function; deformity; crepitation: pain in a joint or an extremity, the neck, or the back, especially with movement.  Neurological: Patient denies dizziness, tremor, ataxia, difficulty in speaking, change in speech, paresthesia, loss of sensation, seizures, syncope, changes in memory.  Endocrine system: Patient denies tremors, palpitations, intolerance of heat or cold, polyuria, polydipsia, polyphagia, diaphoresis, exophthalmos, or goiter.  Psychological: Patient denies thoughts/plans or harming self or other; depression,  insomnia, night terrors, herminia, memory loss, disorientation.  Skin: Patient denies any bruising, rashes, discoloration, pruritus, wounds,  "ulcers, decubiti, changes in the hair or nails  Hematopoietic: Patient denies history of spontaneous or excessive bleeding, epistaxis, hematuria, melena, fatigue, enlarged or tender lymph nodes, pallor, history of anemia.    Physical Exam: 61 y.o. female  Vitals:    08/16/22 0545   BP: 153/91   BP Location: Left arm   Patient Position: Sitting   Pulse: 67   Resp: 11   Temp: 98.1 °F (36.7 °C)   TempSrc: Oral   SpO2: 97%   Weight: 110 kg (242 lb)   Height: 167.6 cm (66\")       General Appearance:          Alert, cooperative, in no acute distress                                                 Head:    Normocephalic, without obvious abnormality, atraumatic   Eyes:            Lids and lashes normal, conjunctivae and sclerae normal, no   icterus, no pallor, corneas clear, PERRLA   Ears:    Ears appear intact with no abnormalities noted   Throat:   No oral lesions, no thrush, oral mucosa moist   Neck:   No adenopathy, supple, trachea midline, no thyromegaly, no   carotid bruit, no JVD   Back:     No kyphosis present, no scoliosis present, no skin lesions,      erythema or scars, no tenderness to percussion or                   palpation,   range of motion normal   Lungs:     Clear to auscultation,respirations regular, even and                  unlabored    Heart:    Regular rhythm and normal rate, normal S1 and S2, no            murmur, no gallop, no rub, no click   Chest Wall:    No abnormalities observed   Abdomen:     Normal bowel sounds, no masses, no organomegaly, soft        nontender, nondistended, no guarding, no rebound                tenderness   Rectal:     Deferred   Extremities:   Tenderness over anterior and medial aspect of the left knee. Moves all extremities well, no edema,   no cyanosis, no redness   Pulses:   Pulses palpable and equal bilaterally   Skin:   No bleeding, bruising or rash   Lymph nodes:   No palpable adenopathy   Neurologic:   Cranial nerves 2 - 12 grossly intact, sensation intact, DTR     "   present and equal bilaterally         left knee.The patient is status post total knee arthroplasty postoperative 2.5 month(s). Incision is clean. Calf is soft and nontender. Homans sign is negative. There is no clicking, popping or catching. Anterior and posterior drawer signs are negative.  There is no instability of the components. Appropriate amounts of swelling and bruising are noted. Dorsalis pedis and posterior tibial artery pulses are palpable. Common peroneal nerve function is well preserved. Range of motion is from 0-60 degrees of flexion. Gait is cautious but otherwise fairly normal. There is no evidence of a deep seated joint infection.  Diagnostic Tests:  Admission on 08/16/2022   Component Date Value Ref Range Status   • WBC 08/11/2022 5.94  3.40 - 10.80 10*3/mm3 Final   • RBC 08/11/2022 5.07  3.77 - 5.28 10*6/mm3 Final   • Hemoglobin 08/11/2022 13.2  12.0 - 15.9 g/dL Final   • Hematocrit 08/11/2022 41.6  34.0 - 46.6 % Final   • MCV 08/11/2022 82.1  79.0 - 97.0 fL Final   • MCH 08/11/2022 26.0 (A) 26.6 - 33.0 pg Final   • MCHC 08/11/2022 31.7  31.5 - 35.7 g/dL Final   • RDW 08/11/2022 13.6  12.3 - 15.4 % Final   • RDW-SD 08/11/2022 39.6  37.0 - 54.0 fl Final   • MPV 08/11/2022 11.7  6.0 - 12.0 fL Final   • Platelets 08/11/2022 257  140 - 450 10*3/mm3 Final   • Glucose 08/11/2022 88  65 - 99 mg/dL Final   • BUN 08/11/2022 8  6 - 20 mg/dL Final   • Creatinine 08/11/2022 0.85  0.57 - 1.00 mg/dL Final   • Sodium 08/11/2022 141  136 - 145 mmol/L Final   • Potassium 08/11/2022 3.2 (A) 3.5 - 5.2 mmol/L Final   • Chloride 08/11/2022 103  98 - 107 mmol/L Final   • CO2 08/11/2022 29.2 (A) 22.0 - 29.0 mmol/L Final   • Calcium 08/11/2022 9.3  8.6 - 10.5 mg/dL Final   • BUN/Creatinine Ratio 08/11/2022 9.4  7.0 - 25.0 Final   • Anion Gap 08/11/2022 8.8  5.0 - 15.0 mmol/L Final   • eGFR 08/11/2022 79.0  >60.0 mL/min/1.73 Final    National Kidney Foundation and American Society of Nephrology (ASN) Task Force  recommended calculation based on the Chronic Kidney Disease Epidemiology Collaboration (CKD-EPI) equation refit without adjustment for race.   • QT Interval 08/11/2022 475  ms Final   • Potassium 08/13/2022 3.6  3.5 - 5.2 mmol/L Final   • Glucose 08/16/2022 125 (A) 70 - 105 mg/dL Final    Serial Number: [id number]:  [id number]     No results found.      Assessment:  Patient Active Problem List   Diagnosis   • Anxiety state   • Chronic low back pain   • Degeneration of intervertebral disc of lumbar region   • Family history of malignant neoplasm of colon   • Fibromyalgia   • High alkaline phosphatase   • Hives of unknown origin   • Hypertension   • Hypokalemia   • Leukoplakia of oral mucosa   • Lumbar spondylosis   • Lumbar radiculopathy   • MRSA infection   • Obesity with body mass index 30 or greater   • Presence of right artificial knee joint   • Tympanic membrane perforation   • Type 2 diabetes mellitus with hyperglycemia (HCC)   • Vitamin D deficiency   • 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     Risks and benefits of manipulation discussed with the patient.  She understands that this will be a staged procedure.  If her symptoms do not improve in terms of improved range of motion with the manipulation and physical therapy she might actually require lysis of adhesions and resection of scar tissue.  Discharge Plan: today to home      Date: 8/16/2022    Hank Coles MD      DICTATED UTILIZING DRAGON DICTATION

## 2022-08-16 NOTE — ANESTHESIA PROCEDURE NOTES
Peripheral Block    Pre-sedation assessment completed: 8/16/2022 6:45 AM    Patient reassessed immediately prior to procedure    Patient location during procedure: pre-op  Reason for block: procedure for pain, at surgeon's request, post-op pain management and primary anesthetic  Performed by  Anesthesiologist: Rufino Nunes MD  Preanesthetic Checklist  Completed: patient identified, IV checked, site marked, risks and benefits discussed, surgical consent, monitors and equipment checked, pre-op evaluation and timeout performed  Prep:  Pt Position: sitting  Sterile barriers:cap, gloves, mask and washed/disinfected hands  Prep: ChloraPrep  Patient monitoring: blood pressure monitoring, continuous pulse oximetry and EKG  Procedure    Sedation: no  Performed under: local infiltration  Guidance:ultrasound guided and landmark technique    ULTRASOUND INTERPRETATION.  Using ultrasound guidance a 20 G gauge needle was placed in close proximity to the femoral nerve, at which point, under ultrasound guidance anesthetic was injected in the area of the nerve and spread of the anesthesia was seen on ultrasound in close proximity thereto.  There were no abnormalities seen on ultrasound; a digital image was taken; and the patient tolerated the procedure with no complications. Images:still images obtained, printed/placed on chart    Laterality:left  Block Type:adductor canal block  Injection Technique:single-shot  Needle Type:echogenic  Needle Gauge:20 G  Resistance on Injection: less than 15 psi    Medications Used: dexamethasone (DECADRON) injection, 4 mg  ropivacaine (NAROPIN) 0.5 % injection, 30 mL  Med administered at 8/16/2022 7:10 AM      Post Assessment  Injection Assessment: negative aspiration for heme, no paresthesia on injection and incremental injection  Patient Tolerance:comfortable throughout block  Complications:no  Additional Notes  Pre-procedure:  Peripheral nerve block performed preoperatively prior to the  start of anesthesia time at the request of the patient and the surgeon as the primary intraoperative anesthetic as well as for postoperative management of acute surgical pain (regional anesthesia is the primary intraoperative anesthetic); patient identified; pre-procedure vital signs, all relevant labs/studies, complete medical/surgical/anesthetic history, full medication list, full allergy list, and NPO status obtained/reviewed; physical assessment performed; anesthetic options, side effects, potential complications, risks, and benefits discussed; questions answered; patient wishes to proceed with the procedure; written anesthesia procedure consent obtained; patient cleared for procedure; time out performed; IV access in situ    Procedure:  ASA monitor placed; supplemental oxygen provided; patient positioned; hand hygiene performed; sterile technique maintained throughout the procedure; sterile prep applied; insertion site determined by anatomical landmarks, palpation, and ultrasound imaging; live ultrasound guidance throughout the procedure; target nerves/landmarks identified on live ultrasound; skin and subcutaneous tissues numbed by injection of 1% lidocaine; 4 inch 20G StimupKeen Impressions Ultra 360 Insulated Echogenic Needle used; realtime needle advancement and placement near the target nerves witnessed on live ultrasound; negative aspiration prior to injection; correct needle placement confirmed on live ultrasound by local anesthetic spread around the target nerves; local anesthetic mixture injected with negative aspiration prior to each injection and after each 1-5 mL injected; needle withdrawn; dressing applied; ultrasound image printed and placed in the patient's permanent medical record    Post-procedure:  Peripheral nerve block placed successfully; good block; no apparent complications; minimal estimated blood loss; vital signs stable throughout; see nurse's notes for vitals; transported to the operating room,  sedation provided, and surgery started

## 2022-08-19 NOTE — DISCHARGE SUMMARY
Orthopedic Discharge Summary      Patient: Monica Fitzpatrick      YOB: 1960    Medical Record Number: [id number]    Attending Physician: Hank Coles MD orthopedic surgery.  Consulting Physician(s): None.  Date of Admission: 8/16/2022  5:41 AM  Date of Discharge: 8/16/2022  The patient came to the outpatient surgery area for manipulation of the knee under anesthesia following arthrofibrosis after revision total knee arthroplasty.  She did very well postoperatively.  She was released from the PACU unit after she was found to be stable and tolerating her pain well.    Patient Active Problem List   Diagnosis   • Anxiety state   • Chronic low back pain   • Degeneration of intervertebral disc of lumbar region   • Family history of malignant neoplasm of colon   • Fibromyalgia   • High alkaline phosphatase   • Hives of unknown origin   • Hypertension   • Hypokalemia   • Leukoplakia of oral mucosa   • Lumbar spondylosis   • Lumbar radiculopathy   • MRSA infection   • Obesity with body mass index 30 or greater   • Presence of right artificial knee joint   • Tympanic membrane perforation   • Type 2 diabetes mellitus with hyperglycemia (HCC)   • Vitamin D deficiency   • 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   • Morbid obesity (HCC)   • Abnormal EKG     ME MANIPULATN KNEE JT+ANESTHESIA [88184] (KNEE MANIPULATION)       Allergies   Allergen Reactions   • Sulfa Antibiotics Itching, Nausea And Vomiting, Nausea Only and Rash   • Tramadol Itching, Nausea Only, Rash and GI Intolerance       Current Medications:     Discharge Medications      ASK your doctor about these medications      Instructions Start Date   albuterol sulfate  (90 Base) MCG/ACT inhaler  Commonly known as: PROVENTIL HFA;VENTOLIN HFA;PROAIR HFA   2 puffs, Inhalation, Every 6 Hours PRN      atorvastatin 20 MG tablet  Commonly known as: LIPITOR   20 mg, Oral, Nightly      B-12 1000 MCG capsule   1,000 mcg, Oral,  Daily, LD 8/10      buPROPion  MG 24 hr tablet  Commonly known as: WELLBUTRIN XL   1 tablet, Oral, Every Morning      cycloSPORINE 0.05 % ophthalmic emulsion  Commonly known as: RESTASIS   1 drop, Both Eyes, Every 12 Hours      fluticasone 50 MCG/ACT nasal spray  Commonly known as: FLONASE   2 sprays, Nasal, Daily      gabapentin 600 MG tablet  Commonly known as: NEURONTIN   600 mg, Oral, 3 Times Daily      hydrocortisone 2.5 % cream   1 application, Topical, As Needed      hydrOXYzine 25 MG tablet  Commonly known as: ATARAX   25 mg, Oral, Every 8 Hours PRN      losartan 100 MG tablet  Commonly known as: COZAAR   100 mg, Oral, Every Night at Bedtime      multivitamin with minerals tablet tablet   1 tablet, Oral, Daily, LD 8/11      Trulicity 1.5 MG/0.5ML solution pen-injector  Generic drug: Dulaglutide   1.5 mg, Weekly, Wed      Viibryd 40 MG tablet tablet  Generic drug: vilazodone   40 mg, Oral, Every Early Morning      vitamin D3 125 MCG (5000 UT) capsule capsule   5,000 Units, Oral, Daily, LD 5/12                  Past Medical History:   Diagnosis Date   • Anxiety and depression    • Diabetes (HCC)    • Dry eye    • Hyperlipidemia    • Hypertension    • PONV (postoperative nausea and vomiting)         Past Surgical History:   Procedure Laterality Date   • COLONOSCOPY     • ENDOSCOPY     • [place]   • TOTAL KNEE ARTHROPLASTY Right    • TOTAL KNEE ARTHROPLASTY REVISION Left 05/24/2022    Procedure: TOTAL KNEE ARTHROPLASTY REVISION;  Surgeon: Hank Coles MD;  [facility]: [facility];  Service: Orthopedics;  Laterality: Left;   • WRIST SURGERY Bilateral     plate and screws        Social History     Occupational History   • Not on file   Tobacco Use   • Smoking status: Never Smoker   • Smokeless tobacco: Never Used   Vaping Use   • Vaping Use: Never used   Substance and Sexual Activity   • Alcohol use: Never   • Drug use: Never   • Sexual activity: Defer      Social  History     Social History Narrative   • Not on file        Family History   Problem Relation Age of Onset   • Diabetes Mother    • Heart attack Father    • Heart disease Father    • Diabetes Father    • Alcohol abuse Father    • Drug abuse Sister    • Alcohol abuse Brother    • Diabetes Maternal Grandmother              Hospital Course:  61 y.o. female admitted to [facility] to services of No att. 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 providers found. Antibiotic and VTE prophylaxis were per [facility] protocols. Post-operatively the patient transferred to the post-operative floor where the patient underwent mobilization therapy that included active as well as passive ROM exercises. Opioids were titrated to achieve appropriate pain management to allow for participation in mobilization exercises. Vital signs are now stable. The incision is intact without signs or symptoms of infection. Operative extremity neurovascular status remains intact.   Appropriate education re: incision care, activity levels, medications, and follow up visits was completed and all questions were answered. The patient is now deemed stable for discharge from hospital.      DIAGNOSTIC TESTS:   Admission on 08/16/2022, Discharged on 08/16/2022   Component Date Value Ref Range Status   • WBC 08/11/2022 5.94  3.40 - 10.80 10*3/mm3 Final   • RBC 08/11/2022 5.07  3.77 - 5.28 10*6/mm3 Final   • Hemoglobin 08/11/2022 13.2  12.0 - 15.9 g/dL Final   • Hematocrit 08/11/2022 41.6  34.0 - 46.6 % Final   • MCV 08/11/2022 82.1  79.0 - 97.0 fL Final   • MCH 08/11/2022 26.0 (A) 26.6 - 33.0 pg Final   • MCHC 08/11/2022 31.7  31.5 - 35.7 g/dL Final   • RDW 08/11/2022 13.6  12.3 - 15.4 % Final   • RDW-SD 08/11/2022 39.6  37.0 - 54.0 fl Final   • MPV 08/11/2022 11.7  6.0 - 12.0 fL Final   • Platelets 08/11/2022 257   140 - 450 10*3/mm3 Final   • Glucose 08/11/2022 88  65 - 99 mg/dL Final   • BUN 08/11/2022 8  6 - 20 mg/dL Final   • Creatinine 08/11/2022 0.85  0.57 - 1.00 mg/dL Final   • Sodium 08/11/2022 141  136 - 145 mmol/L Final   • Potassium 08/11/2022 3.2 (A) 3.5 - 5.2 mmol/L Final   • Chloride 08/11/2022 103  98 - 107 mmol/L Final   • CO2 08/11/2022 29.2 (A) 22.0 - 29.0 mmol/L Final   • Calcium 08/11/2022 9.3  8.6 - 10.5 mg/dL Final   • BUN/Creatinine Ratio 08/11/2022 9.4  7.0 - 25.0 Final   • Anion Gap 08/11/2022 8.8  5.0 - 15.0 mmol/L Final   • eGFR 08/11/2022 79.0  >60.0 mL/min/1.73 Final    National Kidney Foundation and American Society of Nephrology (ASN) Task Force recommended calculation based on the Chronic Kidney Disease Epidemiology Collaboration (CKD-EPI) equation refit without adjustment for race.   • QT Interval 08/11/2022 475  ms Final   • Potassium 08/13/2022 3.6  3.5 - 5.2 mmol/L Final   • Glucose 08/16/2022 125 (A) 70 - 105 mg/dL Final    Serial Number: [id number]:  [id number]     No results found.    Discharge and Follow up Instructions:   Discharge Instructions:       1. Patient is to continue with physical therapy exercises BID and continue working with        the physical therapist as ordered.  2.  Continue to follow precautions as instructed.   3.  Patient may weight bear as tolerated.   4.  Continue VRASHA hose daily and ice regularly. Patient instructed on frequent calf                  pumping exercises.  May remove compression stockings at night.  5.  Patient also instructed on incentive spirometer during hospitalization and                          encouraged to continue to use at home regularly.   6.  Patient is instructed to continue DVT prophylaxis.  7.  The dressing should be left in place. If waterproof dressing is intact the patient may         shower immediately following discharge. If the dressing becomes disloged or                   saturated it should be changed and patient must  wait until POD #5 to shower. If               dressing is changed, apply dry sterile dressing after showering.  8.  Follow up appointment in 2 weeks - patient to call the office at [phone] to schedule.       Date: 8/19/2022    Hank Coles MD      Time: Discharge 20 min     DICTATED UTILIZING DRAGON DICTATION

## 2022-08-19 NOTE — OP NOTE
TOTAL KNEE ARTHROPLASTY OPERATIVE     PATIENT NAME:Monica Fitzpatrick     YOB: 1960     ATTENDING PHYSICIAN: Hank Coles MD orthopedic surgeon    DATE OF PROCEDURE: 8/19/2022     PREOPERATIVE DIAGNOSIS: Severe degenerative osteoarthritis, left knee status post revision total knee arthroplasty with arthrofibrosis and stiffness of the knee.    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    SURGEON:  Hank Coles M.D.        ANESTHESIOLOGIST: Dr. Rufino Nunes MD    ANESTHESIA: Adductor canal block for postop pain control followed by general anesthesia.    POSITION: Supine on the operating room table.    DRAINS: None.    COMPLICATIONS: None.    ESTIMATED BLOOD LOSS: none    IMPLANT USED: None.  SPECIMENS: * No orders in the log *        INDICATION: The patient has been having very significant pain and discomfort in the knee.  She had a revision left total knee arthroplasty about 8 weeks ago.  Initially she made great progress with physical therapy but then she started to plateau out with her range of motion improvements.  We diagnosed her with arthrofibrosis.  There is no evidence of a deep-seated joint infection.  Risks and benefits of manipulation under anesthesia followed by aggressive physical therapy discussed with the patient at length.  The patient understood that if she did not get a full resolution of improved range of motion and relief of pain then we would discuss open resection of scar tissue and possibly a quadricepsplasty to help improve the symptoms of lack of motion and pain.  We had scheduled the patient for manipulation of total knee replacement after all forms of conservative nonoperative care had failed to  provide adequate relief of symptoms.  Patient understood all the risks and benefits which were discussed in great detail including the possibility of death, infection, myocardial infarction, DVT, pulmonary embolism, stiffness of the knee, wound infection and breakdown, possibility of revision surgery, neurovascular compromise, pneumonia, pulmonary embolism      DETAILS OF PROCEDURE: Surgical timeout was called. Operative extremity was correctly identified in the operating room suite. Patient was placed under appropriate anesthetic.  Prior to the manipulation she could passively flex the knee to about 60 degrees.  We performed a gentle manipulation under anesthesia.  There was an audible breaking down of the adhesions.  We were now able to passively flex the knee to 120 degrees without any difficulty.  Her extensor lag had also been reduced to about 5 degrees. No complications were encountered. Sponge count and needle count were correct. The patient was reversed from anesthesia and taken from the operating room to the recovery room in stable condition.  She will be receiving her first round of physical therapy today itself followed by daily physical therapy to retain the improved range of motion accomplished on the table following the manipulation.  I discussed the satisfactory performance of this procedure with the patient’s family and answered all questions for them.       Hank Coles MD  8/19/2022  07:13 EDT

## 2022-11-10 ENCOUNTER — TELEPHONE (OUTPATIENT)
Dept: ORTHOPEDIC SURGERY | Facility: CLINIC | Age: 62
End: 2022-11-10

## 2022-11-10 NOTE — TELEPHONE ENCOUNTER
venkat to [facility] pharmacy, they sent a prescription request for molixicam and mupirocin on 11/4 [phone]

## 2022-11-14 RX ORDER — MELOXICAM 15 MG/1
15 TABLET ORAL DAILY
Qty: 90 TABLET | Refills: 0 | Status: SHIPPED | OUTPATIENT
Start: 2022-11-14

## 2022-11-14 NOTE — TELEPHONE ENCOUNTER
Meloxicam has been sent. Patient does not need the other medication since she is not having surgery

## 2023-01-19 ENCOUNTER — TELEPHONE (OUTPATIENT)
Dept: ORTHOPEDIC SURGERY | Facility: CLINIC | Age: 63
End: 2023-01-19
Payer: MEDICARE

## 2023-01-19 RX ORDER — AMOXICILLIN 500 MG/1
CAPSULE ORAL
Qty: 4 CAPSULE | Refills: 2 | Status: SHIPPED | OUTPATIENT
Start: 2023-01-19 | End: 2023-02-16 | Stop reason: SDUPTHER

## 2023-01-19 NOTE — TELEPHONE ENCOUNTER
Caller: Monica Fam    Relationship to patient: Self    Best call back number:   [phone]    Patient is needing: THE PATIENT IS HAVING HER TEETH PULLED ON 1/29/23 AND 1/30/23 AND IS NEEDING DR CALZADA TO PRESCRIBE ANTIBIOTICS.    PHARMACY:  [facility]  [address] [place] [id number]  Phone  :  [phone]

## 2023-02-16 ENCOUNTER — TELEPHONE (OUTPATIENT)
Dept: ORTHOPEDIC SURGERY | Facility: CLINIC | Age: 63
End: 2023-02-16
Payer: MEDICARE

## 2023-02-16 RX ORDER — AMOXICILLIN 500 MG/1
CAPSULE ORAL
Qty: 4 CAPSULE | Refills: 2 | Status: SHIPPED | OUTPATIENT
Start: 2023-02-16

## 2023-02-16 NOTE — TELEPHONE ENCOUNTER
Caller: BELIA RANGEL    Relationship: SELF    Best call back number: [phone]    Requested Prescriptions:   AMOXICILLAN    Pharmacy where request should be sent:  [address]    Additional details provided by patient: PATIENT NEEDS IT FOR HER SURGERY TOMORROW    Does the patient have less than a 3 day supply:  [x] Yes  [] No    Would you like a call back once the refill request has been completed: [x] Yes [] No    If the office needs to give you a call back, can they leave a voicemail: [x] Yes [] No    Mejia Agarwal Rep   02/16/23 14:38 EST

## 2023-10-11 RX ORDER — MELOXICAM 15 MG/1
15 TABLET ORAL DAILY
Qty: 90 TABLET | Refills: 10 | Status: SHIPPED | OUTPATIENT
Start: 2023-10-11

## 2023-11-24 ENCOUNTER — APPOINTMENT (OUTPATIENT)
Dept: GENERAL RADIOLOGY | Facility: HOSPITAL | Age: 63
End: 2023-11-24
Payer: MEDICARE

## 2023-11-24 ENCOUNTER — HOSPITAL ENCOUNTER (OUTPATIENT)
Facility: HOSPITAL | Age: 63
Setting detail: OBSERVATION
Discharge: HOME | End: 2023-11-27
Attending: EMERGENCY MEDICINE | Admitting: NURSE PRACTITIONER
Payer: MEDICARE

## 2023-11-24 DIAGNOSIS — U07.1 COVID: Primary | ICD-10-CM

## 2023-11-24 DIAGNOSIS — E87.6 HYPOKALEMIA: ICD-10-CM

## 2023-11-24 DIAGNOSIS — R06.00 DYSPNEA, UNSPECIFIED TYPE: ICD-10-CM

## 2023-11-24 DIAGNOSIS — M79.10 MYALGIA: ICD-10-CM

## 2023-11-24 DIAGNOSIS — R11.2 NAUSEA AND VOMITING, UNSPECIFIED VOMITING TYPE: ICD-10-CM

## 2023-11-24 DIAGNOSIS — J02.9 PHARYNGITIS, UNSPECIFIED ETIOLOGY: ICD-10-CM

## 2023-11-24 DIAGNOSIS — R53.1 WEAKNESS: ICD-10-CM

## 2023-11-24 LAB
ALBUMIN SERPL-MCNC: 4.1 G/DL (ref 3.5–5.2)
ALBUMIN/GLOB SERPL: 1.3 G/DL
ALP SERPL-CCNC: 114 U/L (ref 39–117)
ALT SERPL W P-5'-P-CCNC: 9 U/L (ref 1–33)
ANION GAP SERPL CALCULATED.3IONS-SCNC: 13 MMOL/L (ref 5–15)
AST SERPL-CCNC: 15 U/L (ref 1–32)
B PARAPERT DNA SPEC QL NAA+PROBE: NOT DETECTED
B PERT DNA SPEC QL NAA+PROBE: NOT DETECTED
BASOPHILS # BLD AUTO: 0 10*3/MM3 (ref 0–0.2)
BASOPHILS NFR BLD AUTO: 0.4 % (ref 0–1.5)
BILIRUB SERPL-MCNC: 0.8 MG/DL (ref 0–1.2)
BUN SERPL-MCNC: 11 MG/DL (ref 8–23)
BUN/CREAT SERPL: 11.7 (ref 7–25)
C PNEUM DNA NPH QL NAA+NON-PROBE: NOT DETECTED
CALCIUM SPEC-SCNC: 9.3 MG/DL (ref 8.6–10.5)
CHLORIDE SERPL-SCNC: 98 MMOL/L (ref 98–107)
CO2 SERPL-SCNC: 24 MMOL/L (ref 22–29)
CREAT SERPL-MCNC: 0.94 MG/DL (ref 0.57–1)
DEPRECATED RDW RBC AUTO: 43.3 FL (ref 37–54)
EGFRCR SERPLBLD CKD-EPI 2021: 69.2 ML/MIN/1.73
EOSINOPHIL # BLD AUTO: 0 10*3/MM3 (ref 0–0.4)
EOSINOPHIL NFR BLD AUTO: 0.1 % (ref 0.3–6.2)
ERYTHROCYTE [DISTWIDTH] IN BLOOD BY AUTOMATED COUNT: 14.7 % (ref 12.3–15.4)
FLUAV SUBTYP SPEC NAA+PROBE: NOT DETECTED
FLUBV RNA ISLT QL NAA+PROBE: NOT DETECTED
GLOBULIN UR ELPH-MCNC: 3.2 GM/DL
GLUCOSE BLDC GLUCOMTR-MCNC: 189 MG/DL (ref 70–105)
GLUCOSE SERPL-MCNC: 135 MG/DL (ref 65–99)
HADV DNA SPEC NAA+PROBE: NOT DETECTED
HCOV 229E RNA SPEC QL NAA+PROBE: NOT DETECTED
HCOV HKU1 RNA SPEC QL NAA+PROBE: NOT DETECTED
HCOV NL63 RNA SPEC QL NAA+PROBE: NOT DETECTED
HCOV OC43 RNA SPEC QL NAA+PROBE: NOT DETECTED
HCT VFR BLD AUTO: 37.9 % (ref 34–46.6)
HGB BLD-MCNC: 12.7 G/DL (ref 12–15.9)
HMPV RNA NPH QL NAA+NON-PROBE: NOT DETECTED
HPIV1 RNA ISLT QL NAA+PROBE: NOT DETECTED
HPIV2 RNA SPEC QL NAA+PROBE: NOT DETECTED
HPIV3 RNA NPH QL NAA+PROBE: NOT DETECTED
HPIV4 P GENE NPH QL NAA+PROBE: NOT DETECTED
LYMPHOCYTES # BLD AUTO: 0.7 10*3/MM3 (ref 0.7–3.1)
LYMPHOCYTES NFR BLD AUTO: 10.4 % (ref 19.6–45.3)
M PNEUMO IGG SER IA-ACNC: NOT DETECTED
MAGNESIUM SERPL-MCNC: 2.1 MG/DL (ref 1.6–2.4)
MCH RBC QN AUTO: 28.1 PG (ref 26.6–33)
MCHC RBC AUTO-ENTMCNC: 33.4 G/DL (ref 31.5–35.7)
MCV RBC AUTO: 84 FL (ref 79–97)
MONOCYTES # BLD AUTO: 0.7 10*3/MM3 (ref 0.1–0.9)
MONOCYTES NFR BLD AUTO: 11.7 % (ref 5–12)
NEUTROPHILS NFR BLD AUTO: 4.9 10*3/MM3 (ref 1.7–7)
NEUTROPHILS NFR BLD AUTO: 77.4 % (ref 42.7–76)
NRBC BLD AUTO-RTO: 0 /100 WBC (ref 0–0.2)
PLATELET # BLD AUTO: 241 10*3/MM3 (ref 140–450)
PMV BLD AUTO: 9 FL (ref 6–12)
POTASSIUM SERPL-SCNC: 3.2 MMOL/L (ref 3.5–5.2)
PROT SERPL-MCNC: 7.3 G/DL (ref 6–8.5)
RBC # BLD AUTO: 4.51 10*6/MM3 (ref 3.77–5.28)
RHINOVIRUS RNA SPEC NAA+PROBE: NOT DETECTED
RSV RNA NPH QL NAA+NON-PROBE: NOT DETECTED
S PYO AG THROAT QL: NEGATIVE
SARS-COV-2 RNA NPH QL NAA+NON-PROBE: DETECTED
SODIUM SERPL-SCNC: 135 MMOL/L (ref 136–145)
WBC NRBC COR # BLD AUTO: 6.3 10*3/MM3 (ref 3.4–10.8)

## 2023-11-24 PROCEDURE — 71045 X-RAY EXAM CHEST 1 VIEW: CPT

## 2023-11-24 PROCEDURE — 94640 AIRWAY INHALATION TREATMENT: CPT

## 2023-11-24 PROCEDURE — 85025 COMPLETE CBC W/AUTO DIFF WBC: CPT

## 2023-11-24 PROCEDURE — 63710000001 INSULIN LISPRO (HUMAN) PER 5 UNITS: Performed by: NURSE PRACTITIONER

## 2023-11-24 PROCEDURE — 94664 DEMO&/EVAL PT USE INHALER: CPT

## 2023-11-24 PROCEDURE — G0378 HOSPITAL OBSERVATION PER HR: HCPCS

## 2023-11-24 PROCEDURE — 25010000002 KETOROLAC TROMETHAMINE PER 15 MG

## 2023-11-24 PROCEDURE — 83735 ASSAY OF MAGNESIUM: CPT

## 2023-11-24 PROCEDURE — 25010000002 ONDANSETRON PER 1 MG

## 2023-11-24 PROCEDURE — 94799 UNLISTED PULMONARY SVC/PX: CPT

## 2023-11-24 PROCEDURE — 82948 REAGENT STRIP/BLOOD GLUCOSE: CPT

## 2023-11-24 PROCEDURE — 80053 COMPREHEN METABOLIC PANEL: CPT

## 2023-11-24 PROCEDURE — 96375 TX/PRO/DX INJ NEW DRUG ADDON: CPT

## 2023-11-24 PROCEDURE — 99284 EMERGENCY DEPT VISIT MOD MDM: CPT

## 2023-11-24 PROCEDURE — 87651 STREP A DNA AMP PROBE: CPT

## 2023-11-24 PROCEDURE — 0202U NFCT DS 22 TRGT SARS-COV-2: CPT

## 2023-11-24 PROCEDURE — 94761 N-INVAS EAR/PLS OXIMETRY MLT: CPT

## 2023-11-24 RX ORDER — DEXTROSE MONOHYDRATE 25 G/50ML
25 INJECTION, SOLUTION INTRAVENOUS
Status: DISCONTINUED | OUTPATIENT
Start: 2023-11-24 | End: 2023-11-27 | Stop reason: HOSPADM

## 2023-11-24 RX ORDER — POTASSIUM CHLORIDE 20 MEQ/1
40 TABLET, EXTENDED RELEASE ORAL ONCE
Status: COMPLETED | OUTPATIENT
Start: 2023-11-24 | End: 2023-11-24

## 2023-11-24 RX ORDER — ALBUTEROL SULFATE 90 UG/1
2 AEROSOL, METERED RESPIRATORY (INHALATION) ONCE
Status: COMPLETED | OUTPATIENT
Start: 2023-11-24 | End: 2023-11-24

## 2023-11-24 RX ORDER — BISACODYL 10 MG
10 SUPPOSITORY, RECTAL RECTAL DAILY PRN
Status: DISCONTINUED | OUTPATIENT
Start: 2023-11-24 | End: 2023-11-27 | Stop reason: HOSPADM

## 2023-11-24 RX ORDER — ACETAMINOPHEN 325 MG/1
650 TABLET ORAL EVERY 4 HOURS PRN
Status: DISCONTINUED | OUTPATIENT
Start: 2023-11-24 | End: 2023-11-27 | Stop reason: HOSPADM

## 2023-11-24 RX ORDER — ACETAMINOPHEN 650 MG/1
650 SUPPOSITORY RECTAL EVERY 4 HOURS PRN
Status: DISCONTINUED | OUTPATIENT
Start: 2023-11-24 | End: 2023-11-27 | Stop reason: HOSPADM

## 2023-11-24 RX ORDER — BUPROPION HYDROCHLORIDE 300 MG/1
1 TABLET ORAL EVERY MORNING
COMMUNITY

## 2023-11-24 RX ORDER — ONDANSETRON 2 MG/ML
4 INJECTION INTRAMUSCULAR; INTRAVENOUS ONCE
Status: COMPLETED | OUTPATIENT
Start: 2023-11-24 | End: 2023-11-24

## 2023-11-24 RX ORDER — BISACODYL 5 MG/1
5 TABLET, DELAYED RELEASE ORAL DAILY PRN
Status: DISCONTINUED | OUTPATIENT
Start: 2023-11-24 | End: 2023-11-27 | Stop reason: HOSPADM

## 2023-11-24 RX ORDER — KETOROLAC TROMETHAMINE 15 MG/ML
15 INJECTION, SOLUTION INTRAMUSCULAR; INTRAVENOUS ONCE
Status: COMPLETED | OUTPATIENT
Start: 2023-11-24 | End: 2023-11-24

## 2023-11-24 RX ORDER — GUAIFENESIN 600 MG/1
1200 TABLET, EXTENDED RELEASE ORAL EVERY 12 HOURS SCHEDULED
Status: DISCONTINUED | OUTPATIENT
Start: 2023-11-24 | End: 2023-11-27 | Stop reason: HOSPADM

## 2023-11-24 RX ORDER — SEMAGLUTIDE 1.34 MG/ML
1 INJECTION, SOLUTION SUBCUTANEOUS WEEKLY
COMMUNITY

## 2023-11-24 RX ORDER — AMOXICILLIN 250 MG
2 CAPSULE ORAL 2 TIMES DAILY
Status: DISCONTINUED | OUTPATIENT
Start: 2023-11-24 | End: 2023-11-27 | Stop reason: HOSPADM

## 2023-11-24 RX ORDER — LEVOCETIRIZINE DIHYDROCHLORIDE 5 MG/1
1 TABLET, FILM COATED ORAL DAILY
COMMUNITY

## 2023-11-24 RX ORDER — IPRATROPIUM BROMIDE AND ALBUTEROL SULFATE 2.5; .5 MG/3ML; MG/3ML
3 SOLUTION RESPIRATORY (INHALATION)
Status: DISCONTINUED | OUTPATIENT
Start: 2023-11-24 | End: 2023-11-25

## 2023-11-24 RX ORDER — CYCLOBENZAPRINE HCL 10 MG
0.5 TABLET ORAL 3 TIMES DAILY PRN
COMMUNITY

## 2023-11-24 RX ORDER — NICOTINE POLACRILEX 4 MG
15 LOZENGE BUCCAL
Status: DISCONTINUED | OUTPATIENT
Start: 2023-11-24 | End: 2023-11-27 | Stop reason: HOSPADM

## 2023-11-24 RX ORDER — NITROGLYCERIN 0.4 MG/1
0.4 TABLET SUBLINGUAL
Status: DISCONTINUED | OUTPATIENT
Start: 2023-11-24 | End: 2023-11-27 | Stop reason: HOSPADM

## 2023-11-24 RX ORDER — CHLORTHALIDONE 25 MG/1
1 TABLET ORAL DAILY
COMMUNITY

## 2023-11-24 RX ORDER — IBUPROFEN 600 MG/1
1 TABLET ORAL
Status: DISCONTINUED | OUTPATIENT
Start: 2023-11-24 | End: 2023-11-27 | Stop reason: HOSPADM

## 2023-11-24 RX ORDER — INSULIN LISPRO 100 [IU]/ML
2-9 INJECTION, SOLUTION INTRAVENOUS; SUBCUTANEOUS
Status: DISCONTINUED | OUTPATIENT
Start: 2023-11-24 | End: 2023-11-27 | Stop reason: HOSPADM

## 2023-11-24 RX ORDER — ACETAMINOPHEN 160 MG/5ML
650 SOLUTION ORAL EVERY 4 HOURS PRN
Status: DISCONTINUED | OUTPATIENT
Start: 2023-11-24 | End: 2023-11-27 | Stop reason: HOSPADM

## 2023-11-24 RX ORDER — SODIUM CHLORIDE 0.9 % (FLUSH) 0.9 %
10 SYRINGE (ML) INJECTION AS NEEDED
Status: DISCONTINUED | OUTPATIENT
Start: 2023-11-24 | End: 2023-11-27 | Stop reason: HOSPADM

## 2023-11-24 RX ORDER — SODIUM CHLORIDE 0.9 % (FLUSH) 0.9 %
10 SYRINGE (ML) INJECTION EVERY 12 HOURS SCHEDULED
Status: DISCONTINUED | OUTPATIENT
Start: 2023-11-24 | End: 2023-11-27 | Stop reason: HOSPADM

## 2023-11-24 RX ORDER — SODIUM CHLORIDE 9 MG/ML
40 INJECTION, SOLUTION INTRAVENOUS AS NEEDED
Status: DISCONTINUED | OUTPATIENT
Start: 2023-11-24 | End: 2023-11-27 | Stop reason: HOSPADM

## 2023-11-24 RX ORDER — CHOLECALCIFEROL (VITAMIN D3) 125 MCG
5 CAPSULE ORAL ONCE
Status: COMPLETED | OUTPATIENT
Start: 2023-11-24 | End: 2023-11-24

## 2023-11-24 RX ORDER — BENZONATATE 100 MG/1
100 CAPSULE ORAL 3 TIMES DAILY PRN
Status: DISCONTINUED | OUTPATIENT
Start: 2023-11-24 | End: 2023-11-27 | Stop reason: HOSPADM

## 2023-11-24 RX ORDER — POLYETHYLENE GLYCOL 3350 17 G/17G
17 POWDER, FOR SOLUTION ORAL DAILY PRN
Status: DISCONTINUED | OUTPATIENT
Start: 2023-11-24 | End: 2023-11-27 | Stop reason: HOSPADM

## 2023-11-24 RX ADMIN — Medication 5 MG: at 22:11

## 2023-11-24 RX ADMIN — POTASSIUM CHLORIDE 40 MEQ: 1500 TABLET, EXTENDED RELEASE ORAL at 15:45

## 2023-11-24 RX ADMIN — Medication 10 ML: at 21:41

## 2023-11-24 RX ADMIN — ALBUTEROL SULFATE 2 PUFF: 108 AEROSOL, METERED RESPIRATORY (INHALATION) at 16:16

## 2023-11-24 RX ADMIN — ONDANSETRON 4 MG: 2 INJECTION INTRAMUSCULAR; INTRAVENOUS at 15:10

## 2023-11-24 RX ADMIN — KETOROLAC TROMETHAMINE 15 MG: 15 INJECTION, SOLUTION INTRAMUSCULAR; INTRAVENOUS at 15:10

## 2023-11-24 RX ADMIN — GUAIFENESIN 1200 MG: 600 TABLET, MULTILAYER, EXTENDED RELEASE ORAL at 21:42

## 2023-11-24 RX ADMIN — INSULIN LISPRO 2 UNITS: 100 INJECTION, SOLUTION INTRAVENOUS; SUBCUTANEOUS at 21:42

## 2023-11-24 NOTE — ED PROVIDER NOTES
Subjective   History of Present Illness  63-year-old female presents the ED via EMS with chief complaint of sore throat onset 4 days ago.  Saw her PCP 4 days ago but was not started on anything.  Today she presents with nausea, an episode of vomiting bile, myalgias, and sinus pressure.  She denies any fever, chest pain, SOA, abdominal pain, and diarrhea.        Review of Systems   Constitutional:  Positive for diaphoresis. Negative for chills and fever.   HENT:  Positive for congestion, sinus pressure and sore throat.    Respiratory:  Negative for chest tightness and shortness of breath.    Cardiovascular:  Negative for chest pain and palpitations.   Gastrointestinal:  Positive for nausea and vomiting. Negative for abdominal pain and diarrhea.   Musculoskeletal:  Positive for myalgias.   All other systems reviewed and are negative.      Past Medical History:   Diagnosis Date    Anxiety and depression     Diabetes     Dry eye     Hyperlipidemia     Hypertension     PONV (postoperative nausea and vomiting)        Allergies   Allergen Reactions    Sulfa Antibiotics Itching, Nausea And Vomiting, Nausea Only and Rash    Tramadol Itching, Nausea Only, Rash and GI Intolerance       Past Surgical History:   Procedure Laterality Date    COLONOSCOPY      ENDOSCOPY      KNEE JOINT MANIPULATION Left 8/16/2022    Procedure: KNEE MANIPULATION;  Surgeon: Hank Coles MD;  Location: [facility] OR;  Service: Orthopedics;  Laterality: Left;    [place]PLAC[place]EE Left 2014    [place]    TOTAL KNEE ARTHROPLASTY Right     TOTAL KNEE ARTHROPLASTY REVISION Left 05/24/2022    Procedure: TOTAL KNEE ARTHROPLASTY REVISION;  Surgeon: Hank Coles MD;  Location: [facility] MAIN OR;  Service: Orthopedics;  Laterality: Left;    WRIST SURGERY Bilateral     plate and screws       Family History   Problem Relation Age of Onset    Diabetes Mother     Heart attack Father     Heart disease Father     Diabetes Father     Alcohol abuse Father      Drug abuse Sister     Alcohol abuse Brother     Diabetes Maternal Grandmother        Social History     Socioeconomic History    Marital status:    Tobacco Use    Smoking status: Never     Passive exposure: Never    Smokeless tobacco: Never   Vaping Use    Vaping Use: Never used   Substance and Sexual Activity    Alcohol use: Never    Drug use: Never    Sexual activity: Defer           Objective   Physical Exam  Vitals and nursing note reviewed.   Constitutional:       General: She is not in acute distress.     Appearance: She is ill-appearing. She is not toxic-appearing or diaphoretic.   HENT:      Head: Normocephalic and atraumatic.      Nose: No congestion or rhinorrhea.      Mouth/Throat:      Mouth: Mucous membranes are dry.      Pharynx: Oropharynx is clear. No oropharyngeal exudate or posterior oropharyngeal erythema.   Eyes:      General: No scleral icterus.     Extraocular Movements: Extraocular movements intact.      Conjunctiva/sclera: Conjunctivae normal.      Pupils: Pupils are equal, round, and reactive to light.   Cardiovascular:      Rate and Rhythm: Regular rhythm. Tachycardia present.      Heart sounds: Normal heart sounds. No murmur heard.     No friction rub. No gallop.   Pulmonary:      Effort: Pulmonary effort is normal. No respiratory distress.      Breath sounds: Normal breath sounds. No stridor. No wheezing, rhonchi or rales.   Chest:      Chest wall: No tenderness.   Abdominal:      General: Abdomen is flat. Bowel sounds are normal. There is no distension.      Palpations: Abdomen is soft. There is no mass.      Tenderness: There is no abdominal tenderness. There is no guarding or rebound.      Hernia: No hernia is present.   Musculoskeletal:         General: Normal range of motion.      Cervical back: Normal range of motion and neck supple. No tenderness.   Skin:     General: Skin is warm.   Neurological:      General: No focal deficit present.      Mental Status: She is alert and  "oriented to person, place, and time. Mental status is at baseline.   Psychiatric:         Mood and Affect: Mood normal.         Behavior: Behavior normal.         Thought Content: Thought content normal.         Judgment: Judgment normal.         Procedures           ED Course  ED Course as of 11/24/23 2116 Fri Nov 24, 2023   1614 Spoke with DEE Otero who agrees with placement in obs at this time. She will place orders.  [MV]      ED Course User Index  [MV] Megan Carrasquillo PA-C      /80 (BP Location: Right arm, Patient Position: Sitting)   Pulse 92   Temp 99.4 °F (37.4 °C) (Oral)   Resp 18   Ht 167.6 cm (66\")   Wt 103 kg (226 lb)   SpO2 94%   BMI 36.48 kg/m²   Labs Reviewed   RESPIRATORY PANEL PCR W/ COVID-19 (SARS-COV-2), NP SWAB IN UTM/VTP, 2 HR TAT - Abnormal; Notable for the following components:       Result Value    COVID19 Detected (*)     All other components within normal limits    Narrative:     In the setting of a positive respiratory panel with a viral infection PLUS a negative procalcitonin without other underlying concern for bacterial infection, consider observing off antibiotics or discontinuation of antibiotics and continue supportive care. If the respiratory panel is positive for atypical bacterial infection (Bordetella pertussis, Chlamydophila pneumoniae, or Mycoplasma pneumoniae), consider antibiotic de-escalation to target atypical bacterial infection.   COMPREHENSIVE METABOLIC PANEL - Abnormal; Notable for the following components:    Glucose 135 (*)     Sodium 135 (*)     Potassium 3.2 (*)     All other components within normal limits    Narrative:     GFR Normal >60  Chronic Kidney Disease <60  Kidney Failure <15     CBC WITH AUTO DIFFERENTIAL - Abnormal; Notable for the following components:    Neutrophil % 77.4 (*)     Lymphocyte % 10.4 (*)     Eosinophil % 0.1 (*)     All other components within normal limits   POCT GLUCOSE FINGERSTICK - Abnormal; Notable for the " following components:    Glucose 189 (*)     All other components within normal limits   RAPID STREP A SCREEN - Normal   MAGNESIUM - Normal   POCT GLUCOSE FINGERSTICK   POCT GLUCOSE FINGERSTICK   POCT GLUCOSE FINGERSTICK   POCT GLUCOSE FINGERSTICK   CBC AND DIFFERENTIAL    Narrative:     The following orders were created for panel order CBC & Differential.  Procedure                               Abnormality         Status                     ---------                               -----------         ------                     CBC Auto Differential[[id number]]        Abnormal            Final result                 Please view results for these tests on the individual orders.     Medications   sodium chloride 0.9 % flush 10 mL (has no administration in time range)   sodium chloride 0.9 % flush 10 mL (has no administration in time range)   sodium chloride 0.9 % flush 10 mL (has no administration in time range)   sodium chloride 0.9 % infusion 40 mL (has no administration in time range)   sennosides-docusate (PERICOLACE) 8.6-50 MG per tablet 2 tablet (has no administration in time range)     And   polyethylene glycol (MIRALAX) packet 17 g (has no administration in time range)     And   bisacodyl (DULCOLAX) EC tablet 5 mg (has no administration in time range)     And   bisacodyl (DULCOLAX) suppository 10 mg (has no administration in time range)   nitroglycerin (NITROSTAT) SL tablet 0.4 mg (has no administration in time range)   Potassium Replacement - Follow Nurse / BPA Driven Protocol (has no administration in time range)   Magnesium Standard Dose Replacement - Follow Nurse / BPA Driven Protocol (has no administration in time range)   Phosphorus Replacement - Follow Nurse / BPA Driven Protocol (has no administration in time range)   Calcium Replacement - Follow Nurse / BPA Driven Protocol (has no administration in time range)   acetaminophen (TYLENOL) tablet 650 mg (has no administration in time range)     Or  "  acetaminophen (TYLENOL) 160 MG/5ML oral solution 650 mg (has no administration in time range)     Or   acetaminophen (TYLENOL) suppository 650 mg (has no administration in time range)   dextrose (GLUTOSE) oral gel 15 g (has no administration in time range)   dextrose (D50W) (25 g/50 mL) IV injection 25 g (has no administration in time range)   glucagon (GLUCAGEN) injection 1 mg (has no administration in time range)   insulin lispro (HUMALOG/ADMELOG) injection 2-9 Units (has no administration in time range)   ipratropium-albuterol (DUO-NEB) nebulizer solution 3 mL (3 mL Nebulization Not Given 11/24/23 2031)   guaiFENesin (MUCINEX) 12 hr tablet 1,200 mg (has no administration in time range)   benzonatate (TESSALON) capsule 100 mg (has no administration in time range)   ondansetron (ZOFRAN) injection 4 mg (4 mg Intravenous Given 11/24/23 1510)   ketorolac (TORADOL) injection 15 mg (15 mg Intravenous Given 11/24/23 1510)   potassium chloride (K-DUR,KLOR-CON) CR tablet 40 mEq (40 mEq Oral Given 11/24/23 1545)   albuterol sulfate HFA (PROVENTIL HFA;VENTOLIN HFA;PROAIR HFA) inhaler 2 puff (2 puffs Inhalation Given 11/24/23 1616)     XR Chest 1 View    Result Date: 11/24/2023  Impression: Cardiomegaly with central pulmonary vascular congestion. There is mild perihilar haziness which may indicate early pulmonary edema/CHF pattern versus atypical infection pattern. Correlate clinically. Electronically Signed: Brennen Chavarria MD  11/24/2023 2:55 PM EST  Workstation ID: [id number]                                        Medical Decision Making  Appropriate PPE worn during exam.    /83   Pulse 113   Temp 98.9 °F (37.2 °C) (Oral)   Resp 18   Ht 167.6 cm (66\")   Wt 103 kg (226 lb)   SpO2 96%   BMI 36.48 kg/m²      Co-morbidities --  has a past medical history of Anxiety and depression, Diabetes, Dry eye, Hyperlipidemia, Hypertension, and PONV (postoperative nausea and vomiting).    Radiology interpretation --  X-rays " reviewed by me and interpreted by radiologist:  No radiology results for the last day    Differentials -- Includes, but not limited to the following: COVID, flu, strep, upper respiratory infection, pneumonia, pneumothorax,    Plan --63-year-old female presents to the ED with chief complaint of sore throat, nausea, vomiting, myalgias, and sinus pressure onset 4 days ago.  On exam vital signs stable, heart rate slightly tachycardic, patient appears like she does not feel well.  Lungs are clear to auscultation throughout with no wheezing, rhonchi, or rales.  Patient was given Zofran and Toradol for pain and nausea With only some improvement.  Respiratory PCR positive for COVID.  X-ray read interpreted by myself and radiology as cardiomegaly with central pulmonary vascular congestion, mild  perihilar haziness- likely related to COVID diagnosis.  Potassium of 3.2, patient was given K-Dur.  Labs otherwise unremarkable.  Spoke with nurse practitioner Shanna who agreed with placement in observation at this time.  Pt was placed in obs at this time.     I discussed the findings and recommendations with the patient who voices understanding. Stable while in the ER.       Problems Addressed:  COVID: complicated acute illness or injury  Dyspnea, unspecified type: complicated acute illness or injury  Hypokalemia: complicated acute illness or injury  Myalgia: complicated acute illness or injury  Nausea and vomiting, unspecified vomiting type: complicated acute illness or injury  Pharyngitis, unspecified etiology: complicated acute illness or injury    Amount and/or Complexity of Data Reviewed  Labs: ordered.  Radiology: ordered.    Risk  Prescription drug management.  Decision regarding hospitalization.        Final diagnoses:   Nausea and vomiting, unspecified vomiting type   Pharyngitis, unspecified etiology   Myalgia   Hypokalemia   COVID   Dyspnea, unspecified type       ED Disposition  ED Disposition       ED Disposition    Decision to Admit    Condition   --    Comment   --               No follow-up provider specified.       Medication List        ASK your doctor about these medications      buPROPion  MG 24 hr tablet  Commonly known as: WELLBUTRIN XL  Ask about: Which instructions should I use?                 Megan Carrasquillo PA-C  11/24/23 7868

## 2023-11-24 NOTE — PLAN OF CARE
Goal Outcome Evaluation:  Plan of Care Reviewed With: patient           Outcome Evaluation: Pt admitted to 229 for complications r/t covid 19.  Currently on room air, VSS.  Pt very congested.  Low grade temp of 99.4.  Will continue to monitor.  PPE worn during assessment and admission process.

## 2023-11-25 LAB
ANION GAP SERPL CALCULATED.3IONS-SCNC: 15 MMOL/L (ref 5–15)
BASOPHILS # BLD AUTO: 0 10*3/MM3 (ref 0–0.2)
BASOPHILS NFR BLD AUTO: 0.7 % (ref 0–1.5)
BUN SERPL-MCNC: 14 MG/DL (ref 8–23)
BUN/CREAT SERPL: 13.7 (ref 7–25)
CALCIUM SPEC-SCNC: 9.3 MG/DL (ref 8.6–10.5)
CHLORIDE SERPL-SCNC: 97 MMOL/L (ref 98–107)
CO2 SERPL-SCNC: 21 MMOL/L (ref 22–29)
CREAT SERPL-MCNC: 1.02 MG/DL (ref 0.57–1)
DEPRECATED RDW RBC AUTO: 42 FL (ref 37–54)
EGFRCR SERPLBLD CKD-EPI 2021: 62.7 ML/MIN/1.73
EOSINOPHIL # BLD AUTO: 0 10*3/MM3 (ref 0–0.4)
EOSINOPHIL NFR BLD AUTO: 0.3 % (ref 0.3–6.2)
ERYTHROCYTE [DISTWIDTH] IN BLOOD BY AUTOMATED COUNT: 14.3 % (ref 12.3–15.4)
GLUCOSE BLDC GLUCOMTR-MCNC: 111 MG/DL (ref 70–105)
GLUCOSE BLDC GLUCOMTR-MCNC: 118 MG/DL (ref 70–105)
GLUCOSE BLDC GLUCOMTR-MCNC: 124 MG/DL (ref 70–105)
GLUCOSE SERPL-MCNC: 131 MG/DL (ref 65–99)
HCT VFR BLD AUTO: 38.1 % (ref 34–46.6)
HGB BLD-MCNC: 12.6 G/DL (ref 12–15.9)
LYMPHOCYTES # BLD AUTO: 1.2 10*3/MM3 (ref 0.7–3.1)
LYMPHOCYTES NFR BLD AUTO: 18.2 % (ref 19.6–45.3)
MCH RBC QN AUTO: 27.9 PG (ref 26.6–33)
MCHC RBC AUTO-ENTMCNC: 33.2 G/DL (ref 31.5–35.7)
MCV RBC AUTO: 84.1 FL (ref 79–97)
MONOCYTES # BLD AUTO: 1 10*3/MM3 (ref 0.1–0.9)
MONOCYTES NFR BLD AUTO: 15.1 % (ref 5–12)
NEUTROPHILS NFR BLD AUTO: 4.3 10*3/MM3 (ref 1.7–7)
NEUTROPHILS NFR BLD AUTO: 65.7 % (ref 42.7–76)
NRBC BLD AUTO-RTO: 0.1 /100 WBC (ref 0–0.2)
PLATELET # BLD AUTO: 286 10*3/MM3 (ref 140–450)
PMV BLD AUTO: 9.5 FL (ref 6–12)
POTASSIUM SERPL-SCNC: 2.9 MMOL/L (ref 3.5–5.2)
POTASSIUM SERPL-SCNC: 3.3 MMOL/L (ref 3.5–5.2)
RBC # BLD AUTO: 4.53 10*6/MM3 (ref 3.77–5.28)
SODIUM SERPL-SCNC: 133 MMOL/L (ref 136–145)
WBC NRBC COR # BLD AUTO: 6.6 10*3/MM3 (ref 3.4–10.8)

## 2023-11-25 PROCEDURE — 94799 UNLISTED PULMONARY SVC/PX: CPT

## 2023-11-25 PROCEDURE — 25010000002 POTASSIUM CHLORIDE 10 MEQ/100ML SOLUTION: Performed by: EMERGENCY MEDICINE

## 2023-11-25 PROCEDURE — 84132 ASSAY OF SERUM POTASSIUM: CPT | Performed by: EMERGENCY MEDICINE

## 2023-11-25 PROCEDURE — 96372 THER/PROPH/DIAG INJ SC/IM: CPT

## 2023-11-25 PROCEDURE — 25810000003 SODIUM CHLORIDE 0.9 % SOLUTION: Performed by: EMERGENCY MEDICINE

## 2023-11-25 PROCEDURE — 82948 REAGENT STRIP/BLOOD GLUCOSE: CPT

## 2023-11-25 PROCEDURE — 96366 THER/PROPH/DIAG IV INF ADDON: CPT

## 2023-11-25 PROCEDURE — 25010000002 ENOXAPARIN PER 10 MG: Performed by: NURSE PRACTITIONER

## 2023-11-25 PROCEDURE — 94664 DEMO&/EVAL PT USE INHALER: CPT

## 2023-11-25 PROCEDURE — 80048 BASIC METABOLIC PNL TOTAL CA: CPT | Performed by: NURSE PRACTITIONER

## 2023-11-25 PROCEDURE — G0378 HOSPITAL OBSERVATION PER HR: HCPCS

## 2023-11-25 PROCEDURE — 96365 THER/PROPH/DIAG IV INF INIT: CPT

## 2023-11-25 PROCEDURE — 85025 COMPLETE CBC W/AUTO DIFF WBC: CPT | Performed by: NURSE PRACTITIONER

## 2023-11-25 RX ORDER — POTASSIUM CHLORIDE 20 MEQ/1
40 TABLET, EXTENDED RELEASE ORAL EVERY 4 HOURS
Status: COMPLETED | OUTPATIENT
Start: 2023-11-25 | End: 2023-11-25

## 2023-11-25 RX ORDER — POTASSIUM CHLORIDE 7.45 MG/ML
10 INJECTION INTRAVENOUS
Status: COMPLETED | OUTPATIENT
Start: 2023-11-25 | End: 2023-11-26

## 2023-11-25 RX ORDER — BUPROPION HYDROCHLORIDE 150 MG/1
300 TABLET ORAL EVERY MORNING
Status: DISCONTINUED | OUTPATIENT
Start: 2023-11-25 | End: 2023-11-27 | Stop reason: HOSPADM

## 2023-11-25 RX ORDER — VILAZODONE HYDROCHLORIDE 40 MG/1
20 TABLET ORAL
Status: DISCONTINUED | OUTPATIENT
Start: 2023-11-25 | End: 2023-11-27 | Stop reason: HOSPADM

## 2023-11-25 RX ORDER — ALBUTEROL SULFATE 90 UG/1
2 AEROSOL, METERED RESPIRATORY (INHALATION)
Status: DISCONTINUED | OUTPATIENT
Start: 2023-11-25 | End: 2023-11-26

## 2023-11-25 RX ORDER — CETIRIZINE HYDROCHLORIDE 10 MG/1
10 TABLET ORAL DAILY
Status: DISCONTINUED | OUTPATIENT
Start: 2023-11-25 | End: 2023-11-27 | Stop reason: HOSPADM

## 2023-11-25 RX ORDER — ATORVASTATIN CALCIUM 20 MG/1
20 TABLET, FILM COATED ORAL NIGHTLY
Status: DISCONTINUED | OUTPATIENT
Start: 2023-11-25 | End: 2023-11-25

## 2023-11-25 RX ORDER — MELOXICAM 15 MG/1
15 TABLET ORAL DAILY
Status: DISCONTINUED | OUTPATIENT
Start: 2023-11-25 | End: 2023-11-25

## 2023-11-25 RX ORDER — LOSARTAN POTASSIUM 50 MG/1
100 TABLET ORAL
Status: DISCONTINUED | OUTPATIENT
Start: 2023-11-25 | End: 2023-11-27 | Stop reason: HOSPADM

## 2023-11-25 RX ORDER — GABAPENTIN 600 MG/1
600 TABLET ORAL 3 TIMES DAILY
Status: DISCONTINUED | OUTPATIENT
Start: 2023-11-25 | End: 2023-11-27 | Stop reason: HOSPADM

## 2023-11-25 RX ORDER — ENOXAPARIN SODIUM 100 MG/ML
40 INJECTION SUBCUTANEOUS DAILY
Status: DISCONTINUED | OUTPATIENT
Start: 2023-11-25 | End: 2023-11-27 | Stop reason: HOSPADM

## 2023-11-25 RX ORDER — VILAZODONE HYDROCHLORIDE 40 MG/1
40 TABLET ORAL
Status: DISCONTINUED | OUTPATIENT
Start: 2023-11-25 | End: 2023-11-25

## 2023-11-25 RX ORDER — HYDROXYZINE HYDROCHLORIDE 25 MG/1
25 TABLET, FILM COATED ORAL EVERY 8 HOURS PRN
Status: DISCONTINUED | OUTPATIENT
Start: 2023-11-25 | End: 2023-11-27 | Stop reason: HOSPADM

## 2023-11-25 RX ADMIN — ALBUTEROL SULFATE 2 PUFF: 108 AEROSOL, METERED RESPIRATORY (INHALATION) at 23:30

## 2023-11-25 RX ADMIN — GUAIFENESIN 1200 MG: 600 TABLET, MULTILAYER, EXTENDED RELEASE ORAL at 08:29

## 2023-11-25 RX ADMIN — POTASSIUM CHLORIDE 40 MEQ: 1500 TABLET, EXTENDED RELEASE ORAL at 11:47

## 2023-11-25 RX ADMIN — GABAPENTIN 600 MG: 600 TABLET, FILM COATED ORAL at 20:40

## 2023-11-25 RX ADMIN — NIRMATRELVIR AND RITONAVIR 3 TABLET: KIT at 11:47

## 2023-11-25 RX ADMIN — POTASSIUM CHLORIDE 40 MEQ: 1500 TABLET, EXTENDED RELEASE ORAL at 08:29

## 2023-11-25 RX ADMIN — CETIRIZINE HYDROCHLORIDE 10 MG: 10 TABLET, FILM COATED ORAL at 08:29

## 2023-11-25 RX ADMIN — POTASSIUM CHLORIDE 10 MEQ: 7.46 INJECTION, SOLUTION INTRAVENOUS at 19:22

## 2023-11-25 RX ADMIN — GUAIFENESIN 1200 MG: 600 TABLET, MULTILAYER, EXTENDED RELEASE ORAL at 20:40

## 2023-11-25 RX ADMIN — GABAPENTIN 600 MG: 600 TABLET, FILM COATED ORAL at 16:51

## 2023-11-25 RX ADMIN — GABAPENTIN 600 MG: 600 TABLET, FILM COATED ORAL at 08:29

## 2023-11-25 RX ADMIN — VILAZODONE HYDROCHLORIDE 20 MG: 40 TABLET, FILM COATED ORAL at 11:47

## 2023-11-25 RX ADMIN — NIRMATRELVIR AND RITONAVIR 3 TABLET: KIT at 20:40

## 2023-11-25 RX ADMIN — ALBUTEROL SULFATE 2 PUFF: 108 AEROSOL, METERED RESPIRATORY (INHALATION) at 19:20

## 2023-11-25 RX ADMIN — MELOXICAM 15 MG: 15 TABLET ORAL at 08:29

## 2023-11-25 RX ADMIN — Medication 10 ML: at 08:30

## 2023-11-25 RX ADMIN — POTASSIUM CHLORIDE 10 MEQ: 7.46 INJECTION, SOLUTION INTRAVENOUS at 21:44

## 2023-11-25 RX ADMIN — SODIUM CHLORIDE 1000 ML: 9 INJECTION, SOLUTION INTRAVENOUS at 19:22

## 2023-11-25 RX ADMIN — POTASSIUM CHLORIDE 10 MEQ: 7.46 INJECTION, SOLUTION INTRAVENOUS at 23:00

## 2023-11-25 RX ADMIN — BUPROPION HYDROCHLORIDE 300 MG: 150 TABLET, EXTENDED RELEASE ORAL at 08:29

## 2023-11-25 RX ADMIN — POTASSIUM CHLORIDE 10 MEQ: 7.46 INJECTION, SOLUTION INTRAVENOUS at 20:42

## 2023-11-25 RX ADMIN — LOSARTAN POTASSIUM 100 MG: 50 TABLET, FILM COATED ORAL at 08:29

## 2023-11-25 RX ADMIN — DOCUSATE SODIUM 50 MG AND SENNOSIDES 8.6 MG 2 TABLET: 8.6; 5 TABLET, FILM COATED ORAL at 08:29

## 2023-11-25 RX ADMIN — ENOXAPARIN SODIUM 40 MG: 100 INJECTION SUBCUTANEOUS at 16:51

## 2023-11-25 NOTE — H&P
[facility] H&P    Patient Name: Monica Fitzpatrick  : 1960  MRN: [id number]  Primary Care Physician: Chaya Dorsey MD  Date of admission: 2023     Patient Care Team:  Chaya Dorsey MD as PCP - General (Family Medicine)          Subjective   History Present Illness     Chief Complaint:   Chief Complaint   Patient presents with    Headache     Headache     Headache    ED 2023  63-year-old female presents the ED via EMS with chief complaint of sore throat onset 4 days ago.  Saw her PCP 4 days ago but was not started on anything.  Today she presents with nausea, an episode of vomiting bile, myalgias, and sinus pressure.  She denies any fever, chest pain, SOA, abdominal pain, and diarrhea.     Observation 2023  Patient agrees with HPI noted above including productive cough and congestion with onset .  Denies any shortness of breath currently but states she does not feel well enough to go home today.  Patient currently room air.  Patient would like to start COVID antiviral.    Review of Systems   Neurological:  Positive for headaches.     Review of Systems   Constitutional:  Positive for diaphoresis. Negative for chills and fever.   HENT:  Positive for congestion, sinus pressure and sore throat.    Respiratory:  Negative for chest tightness and shortness of breath.    Cardiovascular:  Negative for chest pain and palpitations.   Gastrointestinal:  Positive for nausea and vomiting. Negative for abdominal pain and diarrhea.   Musculoskeletal:  Positive for myalgias.   All other systems reviewed and are negative.         Personal History     Past Medical History:   Past Medical History:   Diagnosis Date    Anxiety and depression     Diabetes     Dry eye     Hyperlipidemia     Hypertension     PONV (postoperative nausea and vomiting)        Surgical History:      Past Surgical History:   Procedure Laterality Date    COLONOSCOPY      ENDOSCOPY      KNEE JOINT  MANIPULATION Left 8/16/2022    Procedure: KNEE MANIPULATION;  Surgeon: Hank Coles MD;  Location: [facility] MAIN OR;  Service: Orthopedics;  Laterality: Left;    REPLAC[place]EE [place] 2014    [place]    TOTAL KNEE ARTHROPLASTY Right     TOTAL KNEE ARTHROPLASTY REVISION Left 05/24/2022    Procedure: TOTAL KNEE ARTHROPLASTY REVISION;  Surgeon: Hank Coles MD;  Location: [facility] MAIN OR;  Service: Orthopedics;  Laterality: Left;    WRIST SURGERY Bilateral     plate and screws           Family History: family history includes Alcohol abuse in her brother and father; Diabetes in her father, maternal grandmother, and mother; Drug abuse in her sister; Heart attack in her father; Heart disease in her father. Otherwise pertinent FHx was reviewed and unremarkable.     Social History:  reports that she has never smoked. She has never been exposed to tobacco smoke. She has never used smokeless tobacco. She reports that she does not drink alcohol and does not use drugs.      Medications:  Prior to Admission medications    Medication Sig Start Date End Date Taking? Authorizing Provider   albuterol sulfate  (90 Base) MCG/ACT inhaler Inhale 2 puffs Every 6 (Six) Hours As Needed.   Yes Renetta Rico MD   atorvastatin (LIPITOR) 20 MG tablet Take 1 tablet by mouth Every Night. 2/15/22  Yes Renetta Rico MD   buPROPion XL (WELLBUTRIN XL) 300 MG 24 hr tablet Take 1 tablet by mouth Every Morning.   Yes Renetta Rico MD   chlorthalidone (HYGROTON) 25 MG tablet Take 1 tablet by mouth Daily.   Yes Renetta Rico MD   Cyanocobalamin (B-12) 1000 MCG capsule Take 1,000 mcg by mouth Daily. LD 8/10 4/25/17  Yes Renetta Rico MD   cyclobenzaprine (FLEXERIL) 10 MG tablet Take 0.5 tablets by mouth 3 (Three) Times a Day As Needed for Muscle Spasms.   Yes Renetta Rico MD   cycloSPORINE (RESTASIS) 0.05 % ophthalmic emulsion Administer 1 drop to both eyes Every 12 (Twelve) Hours.   Yes  Renetta Rico MD   fluticasone (FLONASE) 50 MCG/ACT nasal spray 2 sprays into the nostril(s) as directed by provider Daily As Needed for Rhinitis.   Yes Renetta Rico MD   gabapentin (NEURONTIN) 600 MG tablet Take 1 tablet by mouth 3 (Three) Times a Day. 2/15/22  Yes Renetta Rico MD   hydrOXYzine (ATARAX) 25 MG tablet Take 1 tablet by mouth Every 8 (Eight) Hours As Needed.   Yes Renetta Rico MD   levocetirizine (XYZAL) 5 MG tablet Take 1 tablet by mouth Daily.   Yes Renetta Rico MD   losartan (COZAAR) 100 MG tablet Take 1 tablet by mouth every night at bedtime. 10/26/17  Yes Renetta Rico MD   meloxicam (MOBIC) 15 MG tablet TAKE 1 TABLET EVERY DAY 10/11/23  Yes Hank Coles MD   multivitamin with minerals tablet tablet Take 1 tablet by mouth Daily. LD 8/11   Yes Renetta Rico MD   Semaglutide,0.25 or 0.5MG/DOS, (Ozempic, 0.25 or 0.5 MG/DOSE,) 2 MG/1.5ML solution pen-injector Inject 1 mg under the skin into the appropriate area as directed 1 (One) Time Per Week. Wednesdays   Yes Renetta Rico MD   Viibryd 40 MG tablet tablet Take 1 tablet by mouth Every Morning. 2/15/22  Yes Renetta Rico MD   FLUoxetine (PROzac) 60 MG tablet Daily. 11/13/17 5/24/22  Renetta Rico MD       Allergies:    Allergies   Allergen Reactions    Sulfa Antibiotics Itching, Nausea And Vomiting, Nausea Only and Rash    Tramadol Itching, Nausea Only, Rash and GI Intolerance       Objective   Objective     Vital Signs  Temp:  [98.4 °F (36.9 °C)-100.1 °F (37.8 °C)] 98.5 °F (36.9 °C)  Heart Rate:  [] 85  Resp:  [18-21] 21  BP: (102-135)/(63-85) 102/63  SpO2:  [90 %-96 %] 90 %  on   ;   Device (Oxygen Therapy): room air  Body mass index is 36.48 kg/m².    Physical Exam  Vitals and nursing note reviewed.   Constitutional:       Appearance: She is obese. She is ill-appearing.   HENT:      Head: Normocephalic and atraumatic.      Right Ear: External ear normal.       Left Ear: External ear normal.      Nose: Nose normal.      Mouth/Throat:      Mouth: Mucous membranes are moist.      Pharynx: Oropharynx is clear.   Eyes:      Extraocular Movements: Extraocular movements intact.   Cardiovascular:      Rate and Rhythm: Normal rate and regular rhythm.      Pulses: Normal pulses.      Heart sounds: Normal heart sounds.   Pulmonary:      Effort: Pulmonary effort is normal.      Breath sounds: Normal breath sounds.   Abdominal:      General: Abdomen is flat. Bowel sounds are normal.      Palpations: Abdomen is soft.   Musculoskeletal:         General: Normal range of motion.      Cervical back: Normal range of motion.   Skin:     General: Skin is warm.   Neurological:      General: No focal deficit present.      Mental Status: She is alert and oriented to person, place, and time.   Psychiatric:         Mood and Affect: Mood normal.         Behavior: Behavior normal.           Results Review:  I have personally reviewed most recent lab results and radiology images and interpretations and agree with findings, most notably: CMP, CBC, chest x-ray, strep and respiratory panel.    Results from last 7 days   Lab Units 11/25/23  0433   WBC 10*3/mm3 6.60   HEMOGLOBIN g/dL 12.6   HEMATOCRIT % 38.1   PLATELETS 10*3/mm3 286     Results from last 7 days   Lab Units 11/25/23  0433 11/24/23  1436   SODIUM mmol/L 133* 135*   POTASSIUM mmol/L 3.3* 3.2*   CHLORIDE mmol/L 97* 98   CO2 mmol/L 21.0* 24.0   BUN mg/dL 14 11   CREATININE mg/dL 1.02* 0.94   GLUCOSE mg/dL 131* 135*   CALCIUM mg/dL 9.3 9.3   ALK PHOS U/L  --  114   ALT (SGPT) U/L  --  9   AST (SGOT) U/L  --  15     Estimated Creatinine Clearance: 70.2 mL/min (A) (by C-G formula based on SCr of 1.02 mg/dL (H)).  Brief Urine Lab Results       None            Microbiology Results (last 10 days)       Procedure Component Value - Date/Time    Respiratory Panel PCR w/COVID-19(SARS-CoV-2) YAIR/KEVIN/JESSIE/PAD/COR/TANGELA In-House, NP Swab in UTM/VTM, 2 HR  TAT - Swab, Nasopharynx [[id number]]  (Abnormal) Collected: 11/24/23 1445    Lab Status: Final result Specimen: Swab from Nasopharynx Updated: 11/24/23 1601     ADENOVIRUS, PCR Not Detected     Coronavirus 229E Not Detected     Coronavirus HKU1 Not Detected     Coronavirus NL63 Not Detected     Coronavirus OC43 Not Detected     COVID19 Detected     Human Metapneumovirus Not Detected     Human Rhinovirus/Enterovirus Not Detected     Influenza A PCR Not Detected     Influenza B PCR Not Detected     Parainfluenza Virus 1 Not Detected     Parainfluenza Virus 2 Not Detected     Parainfluenza Virus 3 Not Detected     Parainfluenza Virus 4 Not Detected     RSV, PCR Not Detected     Bordetella pertussis pcr Not Detected     Bordetella parapertussis PCR Not Detected     Chlamydophila pneumoniae PCR Not Detected     Mycoplasma pneumo by PCR Not Detected    Narrative:      In the setting of a positive respiratory panel with a viral infection PLUS a negative procalcitonin without other underlying concern for bacterial infection, consider observing off antibiotics or discontinuation of antibiotics and continue supportive care. If the respiratory panel is positive for atypical bacterial infection (Bordetella pertussis, Chlamydophila pneumoniae, or Mycoplasma pneumoniae), consider antibiotic de-escalation to target atypical bacterial infection.    Rapid Strep A Screen - Swab, Throat [[id number]]  (Normal) Collected: 11/24/23 1445    Lab Status: Final result Specimen: Swab from Throat Updated: 11/24/23 1517     Strep A Ag Negative            ECG/EMG Results (most recent)       Procedure Component Value Units Date/Time    SCANNED - TELEMETRY   [[id number]] Resulted: 11/24/23     Updated: 11/25/23 0024    SCANNED - TELEMETRY   [[id number]] Resulted: 11/24/23     Updated: 11/25/23 0024    SCANNED - TELEMETRY   [[id number]] Resulted: 11/24/23     Updated: 11/25/23 0245                Results for orders placed during the hospital encounter  of 05/13/22    Adult Transthoracic Echo Complete W/ Cont if Necessary Per Protocol    Interpretation Summary  · Left ventricular ejection fraction is 55 to 60%  · Left ventricular systolic function is normal.  · Mild dilation of the aortic root is present.  · Left ventricular diastolic function is consistent with (grade II w/high LAP) pseudonormalization.      XR Chest 1 View    Result Date: 11/24/2023  Impression: Cardiomegaly with central pulmonary vascular congestion. There is mild perihilar haziness which may indicate early pulmonary edema/CHF pattern versus atypical infection pattern. Correlate clinically. Electronically Signed: Brennen Chavarria MD  11/24/2023 2:55 PM EST  Workstation ID: [id number]       Estimated Creatinine Clearance: 70.2 mL/min (A) (by C-G formula based on SCr of 1.02 mg/dL (H)).    Assessment & Plan   Assessment/Plan       Active Hospital Problems    Diagnosis  POA    **Dyspnea [R06.00]  Yes      Resolved Hospital Problems   No resolved problems to display.       Dyspnea due to COVID  -CBC generally unremarkable  -Respiratory panel positive for COVID-19  -In the ED patient received albuterol, Toradol and Zofran  -Paxlovid ordered  -O2 as needed  -Lovenox prophylactic ordered  -Continue Wellbutrin    Hypokalemia  -Potassium 3.2 on admission.  -Received 40 mEq of potassium in the ED  -Electrolyte replacement protocol ordered  -Potassium 3.3 today  -Recheck potassium this afternoon  -Magnesium and within normal range  -Sodium mildly decreased at 133 today.  Recheck in a.m.    Hypertension  -Well controlled   BP Readings from Last 1 Encounters:   11/25/23 102/63   - Continue Cozaar  - Monitor while admitted     Hyperlipidemia  -Hold Lipitor due to Paxlovid    Depression/anxiety  -Viibryd ordered at decreased dose due to Paxlovid    Obesity  -BMI 36.48  -Lifestyle modifications    VTE Prophylaxis -   Mechanical Order History:        Ordered        11/24/23 1822  Place Sequential Compression Device   Once            11/24/23 1822  Maintain Sequential Compression Device  Continuous                          Pharmalogical Order History:        Ordered     Dose Route Frequency Stop    11/25/23 0943  Enoxaparin Sodium (LOVENOX) syringe 40 mg         40 mg SC Daily --                    CODE STATUS:    Code Status and Medical Interventions:   Ordered at: 11/24/23 1617     Level Of Support Discussed With:    Patient     Code Status (Patient has no pulse and is not breathing):    CPR (Attempt to Resuscitate)     Medical Interventions (Patient has pulse or is breathing):    Full Support       This patient has been examined wearing personal protective equipment.     I discussed the patient's findings and my recommendations with patient and nursing staff.      Signature:Electronically signed by LISSETTE Orr, 11/25/23, 11:12 AM EST.

## 2023-11-25 NOTE — PLAN OF CARE
Problem: Adult Inpatient Plan of Care  Goal: Plan of Care Review  Outcome: Ongoing, Progressing  Flowsheets (Taken 11/25/2023 0521)  Plan of Care Reviewed With: patient  Goal: Patient-Specific Goal (Individualized)  Outcome: Ongoing, Progressing  Goal: Absence of Hospital-Acquired Illness or Injury  Outcome: Ongoing, Progressing  Intervention: Identify and Manage Fall Risk  Recent Flowsheet Documentation  Taken 11/25/2023 0407 by Lorna Lane RN  Safety Promotion/Fall Prevention:   safety round/check completed   room organization consistent   nonskid shoes/slippers when out of bed   lighting adjusted   clutter free environment maintained   assistive device/personal items within reach   activity supervised  Taken 11/25/2023 0210 by Lorna Lane, RN  Safety Promotion/Fall Prevention:   safety round/check completed   room organization consistent   nonskid shoes/slippers when out of bed   lighting adjusted   clutter free environment maintained   assistive device/personal items within reach   activity supervised  Taken 11/25/2023 0039 by Lorna Lane, RN  Safety Promotion/Fall Prevention:   safety round/check completed   room organization consistent   nonskid shoes/slippers when out of bed   lighting adjusted   clutter free environment maintained   assistive device/personal items within reach   activity supervised  Taken 11/24/2023 2210 by Lorna Lane, RN  Safety Promotion/Fall Prevention:   safety round/check completed   room organization consistent   nonskid shoes/slippers when out of bed   lighting adjusted   clutter free environment maintained   activity supervised   assistive device/personal items within reach  Taken 11/24/2023 2030 by Lorna Lane, RN  Safety Promotion/Fall Prevention:   safety round/check completed   room organization consistent   nonskid shoes/slippers when out of bed   lighting adjusted   clutter free environment maintained   assistive device/personal items  within reach   activity supervised  Intervention: Prevent Skin Injury  Recent Flowsheet Documentation  Taken 11/25/2023 0407 by Lorna Lane RN  Body Position: position changed independently  Taken 11/25/2023 0039 by Lorna Lane RN  Body Position: position changed independently  Taken 11/24/2023 2030 by Lorna Lane RN  Body Position: position changed independently  Intervention: Prevent and Manage VTE (Venous Thromboembolism) Risk  Recent Flowsheet Documentation  Taken 11/25/2023 0407 by Lorna Lane RN  Activity Management: up to bedside commode  Taken 11/25/2023 0039 by Lorna Lane RN  Activity Management: ambulated to bathroom  Taken 11/24/2023 2030 by Lorna Lane RN  Activity Management: ambulated to bathroom  Intervention: Prevent Infection  Recent Flowsheet Documentation  Taken 11/25/2023 0407 by Lorna Lane RN  Infection Prevention:   single patient room provided   rest/sleep promoted   personal protective equipment utilized   hand hygiene promoted  Taken 11/25/2023 0210 by Lorna Lane RN  Infection Prevention:   single patient room provided   rest/sleep promoted   personal protective equipment utilized   hand hygiene promoted  Taken 11/25/2023 0039 by Lorna Lane RN  Infection Prevention:   single patient room provided   rest/sleep promoted   personal protective equipment utilized   hand hygiene promoted  Taken 11/24/2023 2210 by Lorna Lane RN  Infection Prevention:   single patient room provided   rest/sleep promoted   personal protective equipment utilized   hand hygiene promoted  Taken 11/24/2023 2030 by Lorna Lane RN  Infection Prevention:   single patient room provided   personal protective equipment utilized   rest/sleep promoted   hand hygiene promoted  Goal: Optimal Comfort and Wellbeing  Outcome: Ongoing, Progressing  Intervention: Provide Person-Centered Care  Recent Flowsheet Documentation  Taken  11/24/2023 2030 by Lorna Lane, RN  Trust Relationship/Rapport:   care explained   choices provided   questions answered   questions encouraged   reassurance provided   thoughts/feelings acknowledged  Goal: Readiness for Transition of Care  Outcome: Ongoing, Progressing     Problem: Hypertension Comorbidity  Goal: Blood Pressure in Desired Range  Outcome: Ongoing, Progressing  Intervention: Maintain Blood Pressure Management  Recent Flowsheet Documentation  Taken 11/25/2023 0407 by Lorna Lane, RN  Medication Review/Management: medications reviewed  Taken 11/25/2023 0210 by Lorna Lane, RN  Medication Review/Management: medications reviewed  Taken 11/25/2023 0039 by Lorna Lane, RN  Medication Review/Management: medications reviewed  Taken 11/24/2023 2210 by Lorna Lane, RN  Medication Review/Management: medications reviewed  Taken 11/24/2023 2030 by Lorna Lane, RN  Medication Review/Management: medications reviewed   Goal Outcome Evaluation:  Plan of Care Reviewed With: patient

## 2023-11-26 PROBLEM — R06.00 DYSPNEA: Status: RESOLVED | Noted: 2023-11-24 | Resolved: 2023-11-26

## 2023-11-26 LAB
ANION GAP SERPL CALCULATED.3IONS-SCNC: 13 MMOL/L (ref 5–15)
BASOPHILS # BLD AUTO: 0 10*3/MM3 (ref 0–0.2)
BASOPHILS NFR BLD AUTO: 0.6 % (ref 0–1.5)
BUN SERPL-MCNC: 14 MG/DL (ref 8–23)
BUN/CREAT SERPL: 16.3 (ref 7–25)
CALCIUM SPEC-SCNC: 8.5 MG/DL (ref 8.6–10.5)
CHLORIDE SERPL-SCNC: 96 MMOL/L (ref 98–107)
CO2 SERPL-SCNC: 18 MMOL/L (ref 22–29)
CREAT SERPL-MCNC: 0.86 MG/DL (ref 0.57–1)
DEPRECATED RDW RBC AUTO: 44.6 FL (ref 37–54)
EGFRCR SERPLBLD CKD-EPI 2021: 77 ML/MIN/1.73
EOSINOPHIL # BLD AUTO: 0.1 10*3/MM3 (ref 0–0.4)
EOSINOPHIL NFR BLD AUTO: 2.2 % (ref 0.3–6.2)
ERYTHROCYTE [DISTWIDTH] IN BLOOD BY AUTOMATED COUNT: 14.7 % (ref 12.3–15.4)
GLUCOSE BLDC GLUCOMTR-MCNC: 102 MG/DL (ref 70–105)
GLUCOSE BLDC GLUCOMTR-MCNC: 117 MG/DL (ref 70–105)
GLUCOSE BLDC GLUCOMTR-MCNC: 99 MG/DL (ref 70–105)
GLUCOSE SERPL-MCNC: 93 MG/DL (ref 65–99)
HCT VFR BLD AUTO: 32.5 % (ref 34–46.6)
HGB BLD-MCNC: 10.6 G/DL (ref 12–15.9)
LYMPHOCYTES # BLD AUTO: 1.1 10*3/MM3 (ref 0.7–3.1)
LYMPHOCYTES NFR BLD AUTO: 32.1 % (ref 19.6–45.3)
MCH RBC QN AUTO: 28.2 PG (ref 26.6–33)
MCHC RBC AUTO-ENTMCNC: 32.6 G/DL (ref 31.5–35.7)
MCV RBC AUTO: 86.3 FL (ref 79–97)
MONOCYTES # BLD AUTO: 0.6 10*3/MM3 (ref 0.1–0.9)
MONOCYTES NFR BLD AUTO: 17.4 % (ref 5–12)
NEUTROPHILS NFR BLD AUTO: 1.6 10*3/MM3 (ref 1.7–7)
NEUTROPHILS NFR BLD AUTO: 47.7 % (ref 42.7–76)
NRBC BLD AUTO-RTO: 0.1 /100 WBC (ref 0–0.2)
PLATELET # BLD AUTO: 198 10*3/MM3 (ref 140–450)
PMV BLD AUTO: 9.3 FL (ref 6–12)
POTASSIUM SERPL-SCNC: 3.7 MMOL/L (ref 3.5–5.2)
RBC # BLD AUTO: 3.77 10*6/MM3 (ref 3.77–5.28)
SODIUM SERPL-SCNC: 127 MMOL/L (ref 136–145)
WBC NRBC COR # BLD AUTO: 3.4 10*3/MM3 (ref 3.4–10.8)

## 2023-11-26 PROCEDURE — 94664 DEMO&/EVAL PT USE INHALER: CPT

## 2023-11-26 PROCEDURE — 85025 COMPLETE CBC W/AUTO DIFF WBC: CPT | Performed by: NURSE PRACTITIONER

## 2023-11-26 PROCEDURE — 82948 REAGENT STRIP/BLOOD GLUCOSE: CPT

## 2023-11-26 PROCEDURE — 94799 UNLISTED PULMONARY SVC/PX: CPT

## 2023-11-26 PROCEDURE — G0378 HOSPITAL OBSERVATION PER HR: HCPCS

## 2023-11-26 PROCEDURE — 25010000002 ENOXAPARIN PER 10 MG: Performed by: NURSE PRACTITIONER

## 2023-11-26 PROCEDURE — 25010000002 POTASSIUM CHLORIDE 10 MEQ/100ML SOLUTION: Performed by: EMERGENCY MEDICINE

## 2023-11-26 PROCEDURE — 94761 N-INVAS EAR/PLS OXIMETRY MLT: CPT

## 2023-11-26 PROCEDURE — 96372 THER/PROPH/DIAG INJ SC/IM: CPT

## 2023-11-26 PROCEDURE — 80048 BASIC METABOLIC PNL TOTAL CA: CPT | Performed by: EMERGENCY MEDICINE

## 2023-11-26 PROCEDURE — 96366 THER/PROPH/DIAG IV INF ADDON: CPT

## 2023-11-26 RX ORDER — ALBUTEROL SULFATE 90 UG/1
2 AEROSOL, METERED RESPIRATORY (INHALATION) EVERY 6 HOURS PRN
Status: DISCONTINUED | OUTPATIENT
Start: 2023-11-26 | End: 2023-11-27 | Stop reason: HOSPADM

## 2023-11-26 RX ADMIN — NIRMATRELVIR AND RITONAVIR 3 TABLET: KIT at 09:46

## 2023-11-26 RX ADMIN — VILAZODONE HYDROCHLORIDE 20 MG: 40 TABLET, FILM COATED ORAL at 06:04

## 2023-11-26 RX ADMIN — POTASSIUM CHLORIDE 10 MEQ: 7.46 INJECTION, SOLUTION INTRAVENOUS at 00:02

## 2023-11-26 RX ADMIN — DOCUSATE SODIUM 50 MG AND SENNOSIDES 8.6 MG 2 TABLET: 8.6; 5 TABLET, FILM COATED ORAL at 09:46

## 2023-11-26 RX ADMIN — ENOXAPARIN SODIUM 40 MG: 100 INJECTION SUBCUTANEOUS at 16:59

## 2023-11-26 RX ADMIN — ALBUTEROL SULFATE 2 PUFF: 108 AEROSOL, METERED RESPIRATORY (INHALATION) at 06:40

## 2023-11-26 RX ADMIN — GABAPENTIN 600 MG: 600 TABLET, FILM COATED ORAL at 09:46

## 2023-11-26 RX ADMIN — NIRMATRELVIR AND RITONAVIR 3 TABLET: KIT at 21:38

## 2023-11-26 RX ADMIN — CETIRIZINE HYDROCHLORIDE 10 MG: 10 TABLET, FILM COATED ORAL at 09:47

## 2023-11-26 RX ADMIN — GUAIFENESIN 1200 MG: 600 TABLET, MULTILAYER, EXTENDED RELEASE ORAL at 21:38

## 2023-11-26 RX ADMIN — GABAPENTIN 600 MG: 600 TABLET, FILM COATED ORAL at 21:38

## 2023-11-26 RX ADMIN — Medication 10 ML: at 09:47

## 2023-11-26 RX ADMIN — BUPROPION HYDROCHLORIDE 300 MG: 150 TABLET, EXTENDED RELEASE ORAL at 06:04

## 2023-11-26 RX ADMIN — LOSARTAN POTASSIUM 100 MG: 50 TABLET, FILM COATED ORAL at 09:46

## 2023-11-26 RX ADMIN — GUAIFENESIN 1200 MG: 600 TABLET, MULTILAYER, EXTENDED RELEASE ORAL at 09:50

## 2023-11-26 RX ADMIN — ALBUTEROL SULFATE 2 PUFF: 108 AEROSOL, METERED RESPIRATORY (INHALATION) at 15:50

## 2023-11-26 RX ADMIN — Medication 10 ML: at 21:39

## 2023-11-26 RX ADMIN — ALBUTEROL SULFATE 2 PUFF: 108 AEROSOL, METERED RESPIRATORY (INHALATION) at 10:34

## 2023-11-26 RX ADMIN — GABAPENTIN 600 MG: 600 TABLET, FILM COATED ORAL at 16:59

## 2023-11-26 NOTE — PROGRESS NOTES
[facility]ation Unit Progress Note    Patient Name: Monica Fitzpatrick  : 1960  MRN: [id number]  Primary Care Physician: Chaya Dorsey MD  Date of admission: 2023     Patient Care Team:  Chaya Dorsey MD as PCP - General (Family Medicine)          Subjective   History Present Illness     Chief Complaint:   Chief Complaint   Patient presents with    Headache     headache    Headache      ED 2023  63-year-old female presents the ED via EMS with chief complaint of sore throat onset 4 days ago.  Saw her PCP 4 days ago but was not started on anything.  Today she presents with nausea, an episode of vomiting bile, myalgias, and sinus pressure.  She denies any fever, chest pain, SOA, abdominal pain, and diarrhea.      Observation 2023  Patient agrees with HPI noted above including productive cough and congestion with onset .  Denies any shortness of breath currently but states she does not feel well enough to go home today.  Patient currently room air.  Patient would like to start COVID antiviral.     Review of Systems   Neurological:  Positive for headaches.     Review of Systems   Constitutional:  Positive for diaphoresis. Negative for chills and fever.   HENT:  Positive for congestion, sinus pressure and sore throat.    Respiratory:  Negative for chest tightness and shortness of breath.    Cardiovascular:  Negative for chest pain and palpitations.   Gastrointestinal:  Positive for nausea and vomiting. Negative for abdominal pain and diarrhea.   Musculoskeletal:  Positive for myalgias.   All other systems reviewed and are negative.            Personal History     Past Medical History:   Past Medical History:   Diagnosis Date    Anxiety and depression     Diabetes     Dry eye     Hyperlipidemia     Hypertension     PONV (postoperative nausea and vomiting)        Surgical History:      Past Surgical History:   Procedure Laterality Date    COLONOSCOPY      ENDOSCOPY      KNEE  JOINT MANIPULATION Left 8/16/2022    Procedure: KNEE MANIPULATION;  Surgeon: Hank Coles MD;  Location: [facility] MAIN OR;  Service: Orthopedics;  Laterality: Left;    [place]PLAC[place]EE [place]    TOTAL KNEE ARTHROPLASTY Right     TOTAL KNEE ARTHROPLASTY REVISION Left 05/24/2022    Procedure: TOTAL KNEE ARTHROPLASTY REVISION;  Surgeon: Hank Coles MD;  Location: [facility] MAIN OR;  Service: Orthopedics;  Laterality: Left;    WRIST SURGERY Bilateral     plate and screws           Family History: family history includes Alcohol abuse in her brother and father; Diabetes in her father, maternal grandmother, and mother; Drug abuse in her sister; Heart attack in her father; Heart disease in her father. Otherwise pertinent FHx was reviewed and unremarkable.     Social History:  reports that she has never smoked. She has never been exposed to tobacco smoke. She has never used smokeless tobacco. She reports that she does not drink alcohol and does not use drugs.      Medications:  Prior to Admission medications    Medication Sig Start Date End Date Taking? Authorizing Provider   albuterol sulfate  (90 Base) MCG/ACT inhaler Inhale 2 puffs Every 6 (Six) Hours As Needed.   Yes Renetta Rico MD   atorvastatin (LIPITOR) 20 MG tablet Take 1 tablet by mouth Every Night. 2/15/22  Yes Renetta Rico MD   buPROPion XL (WELLBUTRIN XL) 300 MG 24 hr tablet Take 1 tablet by mouth Every Morning.   Yes Renetta Rico MD   chlorthalidone (HYGROTON) 25 MG tablet Take 1 tablet by mouth Daily.   Yes Renetta Rico MD   Cyanocobalamin (B-12) 1000 MCG capsule Take 1,000 mcg by mouth Daily. LD 8/10 4/25/17  Yes Renetta Rico MD   cyclobenzaprine (FLEXERIL) 10 MG tablet Take 0.5 tablets by mouth 3 (Three) Times a Day As Needed for Muscle Spasms.   Yes Renetta Rico MD   cycloSPORINE (RESTASIS) 0.05 % ophthalmic emulsion Administer 1 drop to both eyes Every 12 (Twelve) Hours.    Yes Renetta Rico MD   fluticasone (FLONASE) 50 MCG/ACT nasal spray 2 sprays into the nostril(s) as directed by provider Daily As Needed for Rhinitis.   Yes Renetta Rico MD   gabapentin (NEURONTIN) 600 MG tablet Take 1 tablet by mouth 3 (Three) Times a Day. 2/15/22  Yes Renetta Rico MD   hydrOXYzine (ATARAX) 25 MG tablet Take 1 tablet by mouth Every 8 (Eight) Hours As Needed.   Yes Renetta Rico MD   levocetirizine (XYZAL) 5 MG tablet Take 1 tablet by mouth Daily.   Yes Renetta Rico MD   losartan (COZAAR) 100 MG tablet Take 1 tablet by mouth every night at bedtime. 10/26/17  Yes Renetta Rico MD   meloxicam (MOBIC) 15 MG tablet TAKE 1 TABLET EVERY DAY 10/11/23  Yes Hank Coles MD   multivitamin with minerals tablet tablet Take 1 tablet by mouth Daily. LD 8/11   Yes Renetta Rico MD   Nirmatrelvir&Ritonavir 300/100 (PAXLOVID) 20 x 150 MG & 10 x 100MG tablet therapy pack tablet Take 3 tablets by mouth 2 (Two) Times a Day for 7 doses. 11/26/23 11/30/23 Yes Shanna Martin APRN   Semaglutide,0.25 or 0.5MG/DOS, (Ozempic, 0.25 or 0.5 MG/DOSE,) 2 MG/1.5ML solution pen-injector Inject 1 mg under the skin into the appropriate area as directed 1 (One) Time Per Week. Wednesdays   Yes Renetta Rico MD   Viibryd 40 MG tablet tablet Take 1 tablet by mouth Every Morning. 2/15/22  Yes Renetta Rico MD   FLUoxetine (PROzac) 60 MG tablet Daily. 11/13/17 5/24/22  Renetta Rico MD       Allergies:    Allergies   Allergen Reactions    Sulfa Antibiotics Itching, Nausea And Vomiting, Nausea Only and Rash    Tramadol Itching, Nausea Only, Rash and GI Intolerance       Objective   Objective     Vital Signs  Temp:  [97.1 °F (36.2 °C)-97.7 °F (36.5 °C)] 97.6 °F (36.4 °C)  Heart Rate:  [60-82] 82  Resp:  [17-24] 18  BP: ()/(40-83) 116/83  SpO2:  [90 %-97 %] 97 %  on  Flow (L/min):  [2] 2;   Device (Oxygen Therapy): room air  Body mass index is  36.48 kg/m².    Physical Exam  Vitals and nursing note reviewed.   Constitutional:       Appearance: Normal appearance. She is obese.   HENT:      Head: Normocephalic and atraumatic.      Right Ear: External ear normal.      Left Ear: External ear normal.      Nose: Nose normal.      Mouth/Throat:      Mouth: Mucous membranes are moist.      Pharynx: Oropharynx is clear.   Eyes:      Extraocular Movements: Extraocular movements intact.   Cardiovascular:      Rate and Rhythm: Normal rate and regular rhythm.      Pulses: Normal pulses.      Heart sounds: Normal heart sounds.   Pulmonary:      Effort: Pulmonary effort is normal.      Breath sounds: Normal breath sounds.   Abdominal:      General: Abdomen is flat. Bowel sounds are normal.      Palpations: Abdomen is soft.   Musculoskeletal:         General: Normal range of motion.      Cervical back: Normal range of motion.   Skin:     General: Skin is warm.   Neurological:      General: No focal deficit present.      Mental Status: She is alert and oriented to person, place, and time.      Motor: Weakness present.   Psychiatric:         Mood and Affect: Mood normal.         Behavior: Behavior normal.         Results Review:  I have personally reviewed most recent lab results and radiology images and interpretations and agree with findings, most notably: CMP, CBC, chest x-ray, strep and respiratory panel.     Results from last 7 days   Lab Units 11/26/23  0329   WBC 10*3/mm3 3.40   HEMOGLOBIN g/dL 10.6*   HEMATOCRIT % 32.5*   PLATELETS 10*3/mm3 198     Results from last 7 days   Lab Units 11/26/23  0329 11/25/23  0433 11/24/23  1436   SODIUM mmol/L 127*   < > 135*   POTASSIUM mmol/L 3.7   < > 3.2*   CHLORIDE mmol/L 96*   < > 98   CO2 mmol/L 18.0*   < > 24.0   BUN mg/dL 14   < > 11   CREATININE mg/dL 0.86   < > 0.94   GLUCOSE mg/dL 93   < > 135*   CALCIUM mg/dL 8.5*   < > 9.3   ALK PHOS U/L  --   --  114   ALT (SGPT) U/L  --   --  9   AST (SGOT) U/L  --   --  15    < > =  values in this interval not displayed.     Estimated Creatinine Clearance: 83.3 mL/min (by C-G formula based on SCr of 0.86 mg/dL).  Brief Urine Lab Results       None            Microbiology Results (last 10 days)       Procedure Component Value - Date/Time    Respiratory Panel PCR w/COVID-19(SARS-CoV-2) YAIR/KEVIN/JESSIE/PAD/COR/TANGELA In-House, NP Swab in UTM/VTM, 2 HR TAT - Swab, Nasopharynx [[id number]]  (Abnormal) Collected: 11/24/23 1445    Lab Status: Final result Specimen: Swab from Nasopharynx Updated: 11/24/23 1601     ADENOVIRUS, PCR Not Detected     Coronavirus 229E Not Detected     Coronavirus HKU1 Not Detected     Coronavirus NL63 Not Detected     Coronavirus OC43 Not Detected     COVID19 Detected     Human Metapneumovirus Not Detected     Human Rhinovirus/Enterovirus Not Detected     Influenza A PCR Not Detected     Influenza B PCR Not Detected     Parainfluenza Virus 1 Not Detected     Parainfluenza Virus 2 Not Detected     Parainfluenza Virus 3 Not Detected     Parainfluenza Virus 4 Not Detected     RSV, PCR Not Detected     Bordetella pertussis pcr Not Detected     Bordetella parapertussis PCR Not Detected     Chlamydophila pneumoniae PCR Not Detected     Mycoplasma pneumo by PCR Not Detected    Narrative:      In the setting of a positive respiratory panel with a viral infection PLUS a negative procalcitonin without other underlying concern for bacterial infection, consider observing off antibiotics or discontinuation of antibiotics and continue supportive care. If the respiratory panel is positive for atypical bacterial infection (Bordetella pertussis, Chlamydophila pneumoniae, or Mycoplasma pneumoniae), consider antibiotic de-escalation to target atypical bacterial infection.    Rapid Strep A Screen - Swab, Throat [[id number]]  (Normal) Collected: 11/24/23 1445    Lab Status: Final result Specimen: Swab from Throat Updated: 11/24/23 1517     Strep A Ag Negative            ECG/EMG Results (most recent)        Procedure Component Value Units Date/Time    SCANNED - TELEMETRY   [[id number]] Resulted: 11/24/23     Updated: 11/25/23 0024    SCANNED - TELEMETRY   [[id number]] Resulted: 11/24/23     Updated: 11/25/23 0024    SCANNED - TELEMETRY   [[id number]] Resulted: 11/24/23     Updated: 11/25/23 0245    SCANNED - TELEMETRY   [[id number]] Resulted: 11/24/23     Updated: 11/25/23 1823    SCANNED - TELEMETRY   [[id number]] Resulted: 11/24/23     Updated: 11/25/23 1823    SCANNED - TELEMETRY   [[id number]] Resulted: 11/24/23     Updated: 11/25/23 1823    SCANNED - TELEMETRY   [[id number]] Resulted: 11/24/23     Updated: 11/25/23 1823    SCANNED - TELEMETRY   [[id number]] Resulted: 11/24/23     Updated: 11/25/23 1827    SCANNED - TELEMETRY   [[id number]] Resulted: 11/24/23     Updated: 11/25/23 2006    SCANNED - TELEMETRY   [[id number]] Resulted: 11/24/23     Updated: 11/25/23 2128                Results for orders placed during the hospital encounter of 05/13/22    Adult Transthoracic Echo Complete W/ Cont if Necessary Per Protocol    Interpretation Summary  · Left ventricular ejection fraction is 55 to 60%  · Left ventricular systolic function is normal.  · Mild dilation of the aortic root is present.  · Left ventricular diastolic function is consistent with (grade II w/high LAP) pseudonormalization.      XR Chest 1 View    Result Date: 11/24/2023  Impression: Cardiomegaly with central pulmonary vascular congestion. There is mild perihilar haziness which may indicate early pulmonary edema/CHF pattern versus atypical infection pattern. Correlate clinically. Electronically Signed: Brennen Chavarria MD  11/24/2023 2:55 PM EST  Workstation ID: [id number]       Estimated Creatinine Clearance: 83.3 mL/min (by C-G formula based on SCr of 0.86 mg/dL).    Assessment & Plan   Assessment/Plan       Active Hospital Problems   No active problems to display.      Resolved Hospital Problems    Diagnosis Date Resolved POA    **Dyspnea [R06.00] 11/26/2023  Yes            Dyspnea due to COVID  -CBC generally unremarkable  -Chest x-ray showed cardiomegaly and early pulmonary edema  -Respiratory panel positive for COVID-19  -In the ED patient received albuterol, Toradol and Zofran  -Paxlovid ordered  -O2 as needed  -Lovenox prophylactic ordered  -Continue Wellbutrin  -Patient had been discharged today but RN expressed concerns that patient is from home and lives alone. Per RN, patient appears to be weak today.   -Cancel dc and consult PT  -Intent to dc in am     Hypokalemia  -Potassium 3.2 on admission.  -Received 40 mEq of potassium in the ED  -Electrolyte replacement protocol ordered  -Potassium 3.7 today  -Magnesium and within normal range  -Sodium mildly decreased at 127 today.      Hypertension  -Well controlled       BP Readings from Last 1 Encounters:   11/26/23 95/59   - Continue Cozaar  - Monitor while admitted      Hyperlipidemia  -Hold Lipitor due to Paxlovid  -Restart Lipitor after completing Paxlovid     Depression/anxiety  -Viibryd ordered at decreased dose due to Paxlovid  -Continue Viibryd 20 mg.  Restart 40 mg of Viibryd once Paxlovid is complete     Obesity  -BMI 36.48  -Lifestyle modifications          VTE Prophylaxis -   Mechanical Order History:        Ordered        11/24/23 1822  Place Sequential Compression Device  Once            11/24/23 1822  Maintain Sequential Compression Device  Continuous                          Pharmalogical Order History:        Ordered     Dose Route Frequency Stop    11/25/23 0943  Enoxaparin Sodium (LOVENOX) syringe 40 mg         40 mg SC Daily --                    CODE STATUS:    Code Status and Medical Interventions:   Ordered at: 11/24/23 1617     Level Of Support Discussed With:    Patient     Code Status (Patient has no pulse and is not breathing):    CPR (Attempt to Resuscitate)     Medical Interventions (Patient has pulse or is breathing):    Full Support       This patient has been examined wearing personal  protective equipment.     I discussed the patient's findings and my recommendations with patient and nursing staff.      Signature:Electronically signed by LISSETTE Orr, 11/26/23, 12:25 PM EST.

## 2023-11-26 NOTE — CASE MANAGEMENT/SOCIAL WORK
Continued Stay Note  GAYLE Aragon     Patient Name: Monica Fitzpatrick  MRN: [id number]  Today's Date: 11/26/2023    Admit Date: 11/24/2023        Discharge Plan       Row Name 11/26/23 1604       Plan    Plan Comments DC barriers: PT ordered to eval and treat. Walking oximetry ordered

## 2023-11-26 NOTE — PLAN OF CARE
Problem: Adult Inpatient Plan of Care  Goal: Plan of Care Review  Outcome: Ongoing, Progressing  Flowsheets (Taken 11/26/2023 0246)  Progress: improving  Plan of Care Reviewed With: patient  Goal: Patient-Specific Goal (Individualized)  Outcome: Ongoing, Progressing  Goal: Absence of Hospital-Acquired Illness or Injury  Outcome: Ongoing, Progressing  Intervention: Identify and Manage Fall Risk  Recent Flowsheet Documentation  Taken 11/26/2023 0207 by Lorna Lane, RN  Safety Promotion/Fall Prevention:   safety round/check completed   room organization consistent   nonskid shoes/slippers when out of bed   lighting adjusted   fall prevention program maintained   assistive device/personal items within reach   clutter free environment maintained   activity supervised  Taken 11/26/2023 0008 by Lorna Lane, RN  Safety Promotion/Fall Prevention:   safety round/check completed   room organization consistent   nonskid shoes/slippers when out of bed   lighting adjusted   clutter free environment maintained   assistive device/personal items within reach   activity supervised  Taken 11/25/2023 2205 by Lorna Lane, RN  Safety Promotion/Fall Prevention:   safety round/check completed   room organization consistent   nonskid shoes/slippers when out of bed   lighting adjusted   clutter free environment maintained   assistive device/personal items within reach   activity supervised  Taken 11/25/2023 2020 by Lorna Lane, RN  Safety Promotion/Fall Prevention:   room organization consistent   safety round/check completed   nonskid shoes/slippers when out of bed   lighting adjusted   clutter free environment maintained   assistive device/personal items within reach   activity supervised  Intervention: Prevent Skin Injury  Recent Flowsheet Documentation  Taken 11/26/2023 0008 by Lorna Lane, RN  Body Position: position changed independently  Taken 11/25/2023 2020 by Lorna Lane, RN  Body  Position: position changed independently  Intervention: Prevent and Manage VTE (Venous Thromboembolism) Risk  Recent Flowsheet Documentation  Taken 11/26/2023 0008 by Lorna Lane RN  Activity Management: up to bedside commode  Taken 11/25/2023 2020 by Lorna Lane RN  Activity Management:   up ad galo   up to bedside commode  Intervention: Prevent Infection  Recent Flowsheet Documentation  Taken 11/26/2023 0207 by Lorna Lane RN  Infection Prevention:   single patient room provided   rest/sleep promoted   personal protective equipment utilized   hand hygiene promoted  Taken 11/25/2023 2205 by Lorna Lane RN  Infection Prevention:   single patient room provided   rest/sleep promoted   personal protective equipment utilized   hand hygiene promoted  Taken 11/25/2023 2020 by Lorna Lane RN  Infection Prevention:   single patient room provided   rest/sleep promoted   personal protective equipment utilized   hand hygiene promoted  Goal: Optimal Comfort and Wellbeing  Outcome: Ongoing, Progressing  Intervention: Provide Person-Centered Care  Recent Flowsheet Documentation  Taken 11/25/2023 2020 by Lorna Lane RN  Trust Relationship/Rapport:   care explained   choices provided   questions answered   questions encouraged   reassurance provided   thoughts/feelings acknowledged  Goal: Readiness for Transition of Care  Outcome: Ongoing, Progressing     Problem: Hypertension Comorbidity  Goal: Blood Pressure in Desired Range  Outcome: Ongoing, Progressing  Intervention: Maintain Blood Pressure Management  Recent Flowsheet Documentation  Taken 11/26/2023 0207 by Lorna Lane RN  Medication Review/Management: medications reviewed  Taken 11/26/2023 0008 by Lorna Lane RN  Medication Review/Management: medications reviewed  Taken 11/25/2023 2205 by Lorna Lane RN  Medication Review/Management: medications reviewed  Taken 11/25/2023 2020 by Ariana  Lorna SONG RN  Medication Review/Management: medications reviewed     Problem: Fall Injury Risk  Goal: Absence of Fall and Fall-Related Injury  Outcome: Ongoing, Progressing  Intervention: Identify and Manage Contributors  Recent Flowsheet Documentation  Taken 11/26/2023 0207 by Lorna Lane RN  Medication Review/Management: medications reviewed  Taken 11/26/2023 0008 by Lorna Lane RN  Medication Review/Management: medications reviewed  Self-Care Promotion:   BADL personal routines maintained   BADL personal objects within reach  Taken 11/25/2023 2205 by Lorna Lane RN  Medication Review/Management: medications reviewed  Taken 11/25/2023 2020 by Lorna Lane RN  Medication Review/Management: medications reviewed  Self-Care Promotion:   BADL personal routines maintained   BADL personal objects within reach   independence encouraged  Intervention: Promote Injury-Free Environment  Recent Flowsheet Documentation  Taken 11/26/2023 0207 by Lorna Lane RN  Safety Promotion/Fall Prevention:   safety round/check completed   room organization consistent   nonskid shoes/slippers when out of bed   lighting adjusted   fall prevention program maintained   assistive device/personal items within reach   clutter free environment maintained   activity supervised  Taken 11/26/2023 0008 by Lorna Lane, RN  Safety Promotion/Fall Prevention:   safety round/check completed   room organization consistent   nonskid shoes/slippers when out of bed   lighting adjusted   clutter free environment maintained   assistive device/personal items within reach   activity supervised  Taken 11/25/2023 2205 by Lorna Lane, RN  Safety Promotion/Fall Prevention:   safety round/check completed   room organization consistent   nonskid shoes/slippers when out of bed   lighting adjusted   clutter free environment maintained   assistive device/personal items within reach   activity supervised  Taken  11/25/2023 2020 by Lorna Lane, RN  Safety Promotion/Fall Prevention:   room organization consistent   safety round/check completed   nonskid shoes/slippers when out of bed   lighting adjusted   clutter free environment maintained   assistive device/personal items within reach   activity supervised   Goal Outcome Evaluation:  Plan of Care Reviewed With: patient        Progress: improving

## 2023-11-26 NOTE — PLAN OF CARE
Goal Outcome Evaluation:  Plan of Care Reviewed With: patient           Outcome Evaluation: Pt was going to d/c home today, but when I went in to verify address for ride home pt did looked lethargic.  I asked if she would like to stay one more night and she agreed she was not ready to go home independently.  PT consulted for eval/treat and walking oximetry.  Will reassess for discharge tomorrow.

## 2023-11-26 NOTE — PLAN OF CARE
Goal Outcome Evaluation:  Plan of Care Reviewed With: patient           Outcome Evaluation: Pt discharging home.  Educated on lipitor and vibryd- no to take lipitor and only take .5 tab of vibryd while on paxlovid.

## 2023-11-26 NOTE — DISCHARGE SUMMARY
[facility]    Chaya Dorsey MD    CHIEF COMPLAINT:     Headache    HISTORY OF PRESENT ILLNESS:    Headache         ED 11/24/2023  63-year-old female presents the ED via EMS with chief complaint of sore throat onset 4 days ago.  Saw her PCP 4 days ago but was not started on anything.  Today she presents with nausea, an episode of vomiting bile, myalgias, and sinus pressure.  She denies any fever, chest pain, SOA, abdominal pain, and diarrhea.      Observation 11/25/2023  Patient agrees with HPI noted above including productive cough and congestion with onset Tuesday 11/21.  Denies any shortness of breath currently but states she does not feel well enough to go home today.  Patient currently room air.  Patient would like to start COVID antiviral.       Observation 11/26/2023  Patient feeling better today and comfortable with discharge.    Past Medical History:   Diagnosis Date    Anxiety and depression     Diabetes     Dry eye     Hyperlipidemia     Hypertension     PONV (postoperative nausea and vomiting)      Past Surgical History:   Procedure Laterality Date    COLONOSCOPY      ENDOSCOPY      KNEE JOINT MANIPULATION Left 8/16/2022    Procedure: KNEE MANIPULATION;  Surgeon: Hank Coles MD;  Location: [facility] OR;  Service: Orthopedics;  Laterality: Left;    REPLACEMENT TOTAL KNEE Left 2014    [place]    TOTAL KNEE ARTHROPLASTY Right     TOTAL KNEE ARTHROPLASTY REVISION Left 05/24/2022    Procedure: TOTAL KNEE ARTHROPLASTY REVISION;  Surgeon: Hank Coles MD;  Location: [facility] OR;  Service: Orthopedics;  Laterality: Left;    WRIST SURGERY Bilateral     plate and screws     Family History   Problem Relation Age of Onset    Diabetes Mother     Heart attack Father     Heart disease Father     Diabetes Father     Alcohol abuse Father     Drug abuse Sister     Alcohol abuse Brother     Diabetes Maternal Grandmother      Social History     Tobacco Use    Smoking status: Never      Passive exposure: Never    Smokeless tobacco: Never   Vaping Use    Vaping Use: Never used   Substance Use Topics    Alcohol use: Never    Drug use: Never     Medications Prior to Admission   Medication Sig Dispense Refill Last Dose    albuterol sulfate  (90 Base) MCG/ACT inhaler Inhale 2 puffs Every 6 (Six) Hours As Needed.   11/23/2023    atorvastatin (LIPITOR) 20 MG tablet Take 1 tablet by mouth Every Night.   11/23/2023    buPROPion XL (WELLBUTRIN XL) 300 MG 24 hr tablet Take 1 tablet by mouth Every Morning.   11/24/2023    chlorthalidone (HYGROTON) 25 MG tablet Take 1 tablet by mouth Daily.   11/24/2023    Cyanocobalamin (B-12) 1000 MCG capsule Take 1,000 mcg by mouth Daily. LD 8/10   11/24/2023    cyclobenzaprine (FLEXERIL) 10 MG tablet Take 0.5 tablets by mouth 3 (Three) Times a Day As Needed for Muscle Spasms.   11/23/2023    cycloSPORINE (RESTASIS) 0.05 % ophthalmic emulsion Administer 1 drop to both eyes Every 12 (Twelve) Hours.   11/24/2023    fluticasone (FLONASE) 50 MCG/ACT nasal spray 2 sprays into the nostril(s) as directed by provider Daily As Needed for Rhinitis.   Past Week    gabapentin (NEURONTIN) 600 MG tablet Take 1 tablet by mouth 3 (Three) Times a Day.   11/24/2023    hydrOXYzine (ATARAX) 25 MG tablet Take 1 tablet by mouth Every 8 (Eight) Hours As Needed.   Past Week    levocetirizine (XYZAL) 5 MG tablet Take 1 tablet by mouth Daily.   11/24/2023    losartan (COZAAR) 100 MG tablet Take 1 tablet by mouth every night at bedtime.   11/23/2023    meloxicam (MOBIC) 15 MG tablet TAKE 1 TABLET EVERY DAY 90 tablet 10 11/24/2023    multivitamin with minerals tablet tablet Take 1 tablet by mouth Daily. LD 8/11 11/24/2023    Semaglutide,0.25 or 0.5MG/DOS, (Ozempic, 0.25 or 0.5 MG/DOSE,) 2 MG/1.5ML solution pen-injector Inject 1 mg under the skin into the appropriate area as directed 1 (One) Time Per Week. Wednesdays   Past Month    Viibryd 40 MG tablet tablet Take 1 tablet by mouth Every  Morning.   11/24/2023     Allergies:  Sulfa antibiotics and Tramadol      There is no immunization history on file for this patient.        REVIEW OF SYSTEMS:    Review of Systems   Neurological:  Positive for headaches.     Review of Systems   Constitutional:  Positive for diaphoresis. Negative for chills and fever.   HENT:  Positive for congestion, sinus pressure and sore throat.    Respiratory:  Negative for chest tightness and shortness of breath.    Cardiovascular:  Negative for chest pain and palpitations.   Gastrointestinal:  Positive for nausea and vomiting. Negative for abdominal pain and diarrhea.   Musculoskeletal:  Positive for myalgias.   All other systems reviewed and are negative.       Vital Signs  Temp:  [97.1 °F (36.2 °C)-97.7 °F (36.5 °C)] 97.1 °F (36.2 °C)  Heart Rate:  [60-80] 60  Resp:  [17-24] 18  BP: ()/(40-72) 95/59          Physical Exam:  Physical Exam  Vitals and nursing note reviewed.   Constitutional:       Appearance: Normal appearance. She is obese.   HENT:      Head: Normocephalic and atraumatic.      Right Ear: External ear normal.      Left Ear: External ear normal.      Nose: Nose normal.      Mouth/Throat:      Mouth: Mucous membranes are moist.      Pharynx: Oropharynx is clear.   Eyes:      Extraocular Movements: Extraocular movements intact.   Cardiovascular:      Rate and Rhythm: Normal rate and regular rhythm.      Pulses: Normal pulses.      Heart sounds: Normal heart sounds.   Pulmonary:      Effort: Pulmonary effort is normal.      Breath sounds: Normal breath sounds.   Abdominal:      General: Abdomen is flat. Bowel sounds are normal.      Palpations: Abdomen is soft.   Musculoskeletal:         General: Normal range of motion.      Cervical back: Normal range of motion.   Skin:     General: Skin is warm.   Neurological:      General: No focal deficit present.      Mental Status: She is alert and oriented to person, place, and time.   Psychiatric:         Mood and  Affect: Mood normal.         Behavior: Behavior normal.         Emotional Behavior:   Normal   Debilities:  None  Results Review:    I reviewed the patient's new clinical results.  Lab Results (most recent)       Procedure Component Value Units Date/Time    POC Glucose Once [[id number]]  (Normal) Collected: 11/26/23 0729    Specimen: Blood Updated: 11/26/23 0730     Glucose 99 mg/dL      Comment: Serial Number: [id number]:  [id number]       Basic Metabolic Panel [[id number]]  (Abnormal) Collected: 11/26/23 0329    Specimen: Blood Updated: 11/26/23 0514     Glucose 93 mg/dL      BUN 14 mg/dL      Creatinine 0.86 mg/dL      Sodium 127 mmol/L      Potassium 3.7 mmol/L      Comment: Slight hemolysis detected by analyzer. Result may be falsely elevated.        Chloride 96 mmol/L      CO2 18.0 mmol/L      Calcium 8.5 mg/dL      BUN/Creatinine Ratio 16.3     Anion Gap 13.0 mmol/L      eGFR 77.0 mL/min/1.73     Narrative:      GFR Normal >60  Chronic Kidney Disease <60  Kidney Failure <15      CBC & Differential [[id number]]  (Abnormal) Collected: 11/26/23 0329    Specimen: Blood Updated: 11/26/23 0453    Narrative:      The following orders were created for panel order CBC & Differential.  Procedure                               Abnormality         Status                     ---------                               -----------         ------                     CBC Auto Differential[[id number]]        Abnormal            Final result                 Please view results for these tests on the individual orders.    CBC Auto Differential [[id number]]  (Abnormal) Collected: 11/26/23 0329    Specimen: Blood Updated: 11/26/23 0453     WBC 3.40 10*3/mm3      RBC 3.77 10*6/mm3      Hemoglobin 10.6 g/dL      Hematocrit 32.5 %      MCV 86.3 fL      MCH 28.2 pg      MCHC 32.6 g/dL      RDW 14.7 %      RDW-SD 44.6 fl      MPV 9.3 fL      Platelets 198 10*3/mm3      Neutrophil % 47.7 %      Lymphocyte % 32.1 %      Monocyte % 17.4 %       Eosinophil % 2.2 %      Basophil % 0.6 %      Neutrophils, Absolute 1.60 10*3/mm3      Lymphocytes, Absolute 1.10 10*3/mm3      Monocytes, Absolute 0.60 10*3/mm3      Eosinophils, Absolute 0.10 10*3/mm3      Basophils, Absolute 0.00 10*3/mm3      nRBC 0.1 /100 WBC     POC Glucose Once [[id number]]  (Abnormal) Collected: 11/25/23 2057    Specimen: Blood Updated: 11/25/23 2058     Glucose 124 mg/dL      Comment: Serial Number: [id number]:  [id number]       Potassium [[id number]]  (Abnormal) Collected: 11/25/23 1652    Specimen: Blood Updated: 11/25/23 1723     Potassium 2.9 mmol/L     Basic Metabolic Panel [[id number]]  (Abnormal) Collected: 11/25/23 0433    Specimen: Blood Updated: 11/25/23 0543     Glucose 131 mg/dL      BUN 14 mg/dL      Creatinine 1.02 mg/dL      Sodium 133 mmol/L      Potassium 3.3 mmol/L      Chloride 97 mmol/L      CO2 21.0 mmol/L      Calcium 9.3 mg/dL      BUN/Creatinine Ratio 13.7     Anion Gap 15.0 mmol/L      eGFR 62.7 mL/min/1.73     Narrative:      GFR Normal >60  Chronic Kidney Disease <60  Kidney Failure <15      CBC & Differential [[id number]]  (Abnormal) Collected: 11/25/23 0433    Specimen: Blood Updated: 11/25/23 0530    Narrative:      The following orders were created for panel order CBC & Differential.  Procedure                               Abnormality         Status                     ---------                               -----------         ------                     CBC Auto Differential[[id number]]        Abnormal            Final result                 Please view results for these tests on the individual orders.    CBC Auto Differential [[id number]]  (Abnormal) Collected: 11/25/23 0433    Specimen: Blood Updated: 11/25/23 0530     WBC 6.60 10*3/mm3      RBC 4.53 10*6/mm3      Hemoglobin 12.6 g/dL      Hematocrit 38.1 %      MCV 84.1 fL      MCH 27.9 pg      MCHC 33.2 g/dL      RDW 14.3 %      RDW-SD 42.0 fl      MPV 9.5 fL      Platelets 286 10*3/mm3       Neutrophil % 65.7 %      Lymphocyte % 18.2 %      Monocyte % 15.1 %      Eosinophil % 0.3 %      Basophil % 0.7 %      Neutrophils, Absolute 4.30 10*3/mm3      Lymphocytes, Absolute 1.20 10*3/mm3      Monocytes, Absolute 1.00 10*3/mm3      Eosinophils, Absolute 0.00 10*3/mm3      Basophils, Absolute 0.00 10*3/mm3      nRBC 0.1 /100 WBC     Magnesium [[id number]]  (Normal) Collected: 11/24/23 1436    Specimen: Blood from Cannula Updated: 11/24/23 1613     Magnesium 2.1 mg/dL     Respiratory Panel PCR w/COVID-19(SARS-CoV-2) YAIR/KEVIN/JESSIE/PAD/COR/TANGELA In-House, NP Swab in UTM/VTM, 2 HR TAT - Swab, Nasopharynx [[id number]]  (Abnormal) Collected: 11/24/23 1445    Specimen: Swab from Nasopharynx Updated: 11/24/23 1601     ADENOVIRUS, PCR Not Detected     Coronavirus 229E Not Detected     Coronavirus HKU1 Not Detected     Coronavirus NL63 Not Detected     Coronavirus OC43 Not Detected     COVID19 Detected     Human Metapneumovirus Not Detected     Human Rhinovirus/Enterovirus Not Detected     Influenza A PCR Not Detected     Influenza B PCR Not Detected     Parainfluenza Virus 1 Not Detected     Parainfluenza Virus 2 Not Detected     Parainfluenza Virus 3 Not Detected     Parainfluenza Virus 4 Not Detected     RSV, PCR Not Detected     Bordetella pertussis pcr Not Detected     Bordetella parapertussis PCR Not Detected     Chlamydophila pneumoniae PCR Not Detected     Mycoplasma pneumo by PCR Not Detected    Narrative:      In the setting of a positive respiratory panel with a viral infection PLUS a negative procalcitonin without other underlying concern for bacterial infection, consider observing off antibiotics or discontinuation of antibiotics and continue supportive care. If the respiratory panel is positive for atypical bacterial infection (Bordetella pertussis, Chlamydophila pneumoniae, or Mycoplasma pneumoniae), consider antibiotic de-escalation to target atypical bacterial infection.    Rapid Strep A Screen - Swab,  Throat [[id number]]  (Normal) Collected: 11/24/23 1445    Specimen: Swab from Throat Updated: 11/24/23 1517     Strep A Ag Negative    Comprehensive Metabolic Panel [[id number]]  (Abnormal) Collected: 11/24/23 1436    Specimen: Blood from Cannula Updated: 11/24/23 1508     Glucose 135 mg/dL      BUN 11 mg/dL      Creatinine 0.94 mg/dL      Sodium 135 mmol/L      Potassium 3.2 mmol/L      Chloride 98 mmol/L      CO2 24.0 mmol/L      Calcium 9.3 mg/dL      Total Protein 7.3 g/dL      Albumin 4.1 g/dL      ALT (SGPT) 9 U/L      AST (SGOT) 15 U/L      Alkaline Phosphatase 114 U/L      Total Bilirubin 0.8 mg/dL      Globulin 3.2 gm/dL      A/G Ratio 1.3 g/dL      BUN/Creatinine Ratio 11.7     Anion Gap 13.0 mmol/L      eGFR 69.2 mL/min/1.73     Narrative:      GFR Normal >60  Chronic Kidney Disease <60  Kidney Failure <15              Imaging Results (Most Recent)       Procedure Component Value Units Date/Time    XR Chest 1 View [[id number]] Collected: 11/24/23 1450     Updated: 11/24/23 1457    Narrative:      XR CHEST 1 VW    Date of Exam: 11/24/2023 2:40 PM EST    Indication: soa    Comparison: 5/24/2022    Findings:  There is cardiomegaly. There is central pulmonary vascular congestion. There is slight haziness in the perihilar regions may indicate early pulmonary edema/CHF pattern versus atypical infection pattern. No large effusion identified. No pneumothorax is   seen.      Impression:      Impression:  Cardiomegaly with central pulmonary vascular congestion. There is mild perihilar haziness which may indicate early pulmonary edema/CHF pattern versus atypical infection pattern. Correlate clinically.      Electronically Signed: Brennen Chavarria MD    11/24/2023 2:55 PM EST    Workstation ID: [id number]          reviewed    ECG/EMG Results (most recent)       Procedure Component Value Units Date/Time    SCANNED - TELEMETRY   [[id number]] Resulted: 11/24/23     Updated: 11/25/23 0024    SCANNED - TELEMETRY   [[id number]]  Resulted: 11/24/23     Updated: 11/25/23 0024    SCANNED - TELEMETRY   [[id number]] Resulted: 11/24/23     Updated: 11/25/23 0245    SCANNED - TELEMETRY   [[id number]] Resulted: 11/24/23     Updated: 11/25/23 1823    SCANNED - TELEMETRY   [[id number]] Resulted: 11/24/23     Updated: 11/25/23 1823    SCANNED - TELEMETRY   [[id number]] Resulted: 11/24/23     Updated: 11/25/23 1823    SCANNED - TELEMETRY   [[id number]] Resulted: 11/24/23     Updated: 11/25/23 1823    SCANNED - TELEMETRY   [[id number]] Resulted: 11/24/23     Updated: 11/25/23 1827    SCANNED - TELEMETRY   [[id number]] Resulted: 11/24/23     Updated: 11/25/23 2006    SCANNED - TELEMETRY   [[id number]] Resulted: 11/24/23     Updated: 11/25/23 2128          reviewed        Results for orders placed during the hospital encounter of 05/13/22    Adult Transthoracic Echo Complete W/ Cont if Necessary Per Protocol    Interpretation Summary  · Left ventricular ejection fraction is 55 to 60%  · Left ventricular systolic function is normal.  · Mild dilation of the aortic root is present.  · Left ventricular diastolic function is consistent with (grade II w/high LAP) pseudonormalization.      Microbiology Results (last 10 days)       Procedure Component Value - Date/Time    Respiratory Panel PCR w/COVID-19(SARS-CoV-2) YAIR/KEVIN/JESSIE/PAD/COR/TANGELA In-House, NP Swab in UTM/VTM, 2 HR TAT - Swab, Nasopharynx [[id number]]  (Abnormal) Collected: 11/24/23 1445    Lab Status: Final result Specimen: Swab from Nasopharynx Updated: 11/24/23 1601     ADENOVIRUS, PCR Not Detected     Coronavirus 229E Not Detected     Coronavirus HKU1 Not Detected     Coronavirus NL63 Not Detected     Coronavirus OC43 Not Detected     COVID19 Detected     Human Metapneumovirus Not Detected     Human Rhinovirus/Enterovirus Not Detected     Influenza A PCR Not Detected     Influenza B PCR Not Detected     Parainfluenza Virus 1 Not Detected     Parainfluenza Virus 2 Not Detected     Parainfluenza Virus 3 Not  Detected     Parainfluenza Virus 4 Not Detected     RSV, PCR Not Detected     Bordetella pertussis pcr Not Detected     Bordetella parapertussis PCR Not Detected     Chlamydophila pneumoniae PCR Not Detected     Mycoplasma pneumo by PCR Not Detected    Narrative:      In the setting of a positive respiratory panel with a viral infection PLUS a negative procalcitonin without other underlying concern for bacterial infection, consider observing off antibiotics or discontinuation of antibiotics and continue supportive care. If the respiratory panel is positive for atypical bacterial infection (Bordetella pertussis, Chlamydophila pneumoniae, or Mycoplasma pneumoniae), consider antibiotic de-escalation to target atypical bacterial infection.    Rapid Strep A Screen - Swab, Throat [[id number]]  (Normal) Collected: 11/24/23 1445    Lab Status: Final result Specimen: Swab from Throat Updated: 11/24/23 1517     Strep A Ag Negative            Assessment & Plan     * No active hospital problems. *             Dyspnea due to COVID  -CBC generally unremarkable  -Chest x-ray showed cardiomegaly and early pulmonary edema  -Respiratory panel positive for COVID-19  -In the ED patient received albuterol, Toradol and Zofran  -Paxlovid ordered  -O2 as needed  -Lovenox prophylactic ordered  -Continue Wellbutrin     Hypokalemia  -Potassium 3.2 on admission.  -Received 40 mEq of potassium in the ED  -Electrolyte replacement protocol ordered  -Potassium 3.7 today  -Magnesium and within normal range  -Sodium mildly decreased at 127 today.      Hypertension  -Well controlled   BP Readings from Last 1 Encounters:   11/26/23 95/59   - Continue Cozaar  - Monitor while admitted      Hyperlipidemia  -Hold Lipitor due to Paxlovid  -Restart Lipitor after completing Paxlovid     Depression/anxiety  -Viibryd ordered at decreased dose due to Paxlovid  -Continue Viibryd 20 mg.  Restart 40 mg of Viibryd once Paxlovid is complete     Obesity  -BMI  36.48  -Lifestyle modifications    I discussed the patients findings and my recommendations with patient and nursing staff.     Discharge Diagnosis:      * No active hospital problems. *      Hospital Course  Patient is a 63 y.o. female presented with headache as noted in HPI above.  Respiratory panel positive for COVID.  Labs generally unremarkable.  Patient was kept in the observation unit because she required O2 in the ED.  But she has been in room air during admission.  Potassium mildly decreased and received potassium per protocol.  Patient was also started on Paxlovid and reports feeling better today. At this time, patient felt to be in good condition for discharge with close follow up with PCP. Instructed to take all medications as prescribed and to return to ED if any concerning signs/symptoms. All test/lab results were discussed with patient. All questions were answered and patient verbalizes understanding.   .      Past Medical History:     Past Medical History:   Diagnosis Date    Anxiety and depression     Diabetes     Dry eye     Hyperlipidemia     Hypertension     PONV (postoperative nausea and vomiting)        Past Surgical History:     Past Surgical History:   Procedure Laterality Date    COLONOSCOPY      ENDOSCOPY      KNEE JOINT MANIPULATION Left 8/16/2022    Procedure: KNEE MANIPULATION;  Surgeon: Hank Coles MD;  Location: [facility] OR;  Service: Orthopedics;  Laterality: Left;    REPLACEMENT TOTAL KNEE Left 2014    [place]    TOTAL KNEE ARTHROPLASTY Right     TOTAL KNEE ARTHROPLASTY REVISION Left 05/24/2022    Procedure: TOTAL KNEE ARTHROPLASTY REVISION;  Surgeon: Hank Coles MD;  Location: [facility] OR;  Service: Orthopedics;  Laterality: Left;    WRIST SURGERY Bilateral     plate and screws       Social History:   Social History     Socioeconomic History    Marital status:    Tobacco Use    Smoking status: Never     Passive exposure: Never    Smokeless tobacco: Never   Vaping  Use    Vaping Use: Never used   Substance and Sexual Activity    Alcohol use: Never    Drug use: Never    Sexual activity: Defer       Procedures Performed         Consults:   Consults       No orders found from 10/26/2023 to 11/25/2023.            Condition on Discharge:     Stable    Discharge Disposition  Home or Self Care    Discharge Medications     Discharge Medications        New Medications        Instructions Start Date   Nirmatrelvir&Ritonavir 300/100 20 x 150 MG & 10 x 100MG tablet therapy pack tablet  Commonly known as: PAXLOVID   3 tablets, Oral, 2 Times Daily             Continue These Medications        Instructions Start Date   albuterol sulfate  (90 Base) MCG/ACT inhaler  Commonly known as: PROVENTIL HFA;VENTOLIN HFA;PROAIR HFA   2 puffs, Inhalation, Every 6 Hours PRN      B-12 1000 MCG capsule   1,000 mcg, Oral, Daily, LD 8/10      buPROPion  MG 24 hr tablet  Commonly known as: WELLBUTRIN XL   300 mg, Oral, Every Morning      chlorthalidone 25 MG tablet  Commonly known as: HYGROTON   1 tablet, Oral, Daily      cyclobenzaprine 10 MG tablet  Commonly known as: FLEXERIL   5 mg, Oral, 3 Times Daily PRN      cycloSPORINE 0.05 % ophthalmic emulsion  Commonly known as: RESTASIS   1 drop, Both Eyes, Every 12 Hours      fluticasone 50 MCG/ACT nasal spray  Commonly known as: FLONASE   2 sprays, Nasal, Daily PRN      gabapentin 600 MG tablet  Commonly known as: NEURONTIN   600 mg, Oral, 3 Times Daily      hydrOXYzine 25 MG tablet  Commonly known as: ATARAX   25 mg, Oral, Every 8 Hours PRN      levocetirizine 5 MG tablet  Commonly known as: XYZAL   1 tablet, Oral, Daily      losartan 100 MG tablet  Commonly known as: COZAAR   100 mg, Oral, Every Night at Bedtime      meloxicam 15 MG tablet  Commonly known as: MOBIC   15 mg, Oral, Daily      multivitamin with minerals tablet tablet   1 tablet, Oral, Daily, LD 8/11      Ozempic (0.25 or 0.5 MG/DOSE) 2 MG/1.5ML solution pen-injector  Generic drug:  Semaglutide(0.25 or 0.5MG/DOS)   1 mg, Subcutaneous, Weekly, Wednesdays             Stop These Medications      atorvastatin 20 MG tablet  Commonly known as: LIPITOR     Viibryd 40 MG tablet tablet  Generic drug: vilazodone              Discharge Diet:   Diet Instructions       Diet: Cardiac Diets; Healthy Heart (2-3 Na+); Regular Texture (IDDSI 7); Thin (IDDSI 0)      Discharge Diet: Cardiac Diets    Cardiac Diet: Healthy Heart (2-3 Na+)    Texture: Regular Texture (IDDSI 7)    Fluid Consistency: Thin (IDDSI 0)            Activity at Discharge:     Follow-up Appointments  No future appointments.  Additional Instructions for the Follow-ups that You Need to Schedule       Discharge Follow-up with PCP   As directed       Currently Documented PCP:    Chaya Dorsey MD    PCP Phone Number:    [phone]     Follow Up Details: 5-7 days                Test Results Pending at Discharge       Risk for Readmission (LACE) Score: 3 (11/26/2023  6:00 AM)      Greater than 30 minutes spent in discharge activities for this patient    LISSETTE Orr  11/26/23  10:34 EST

## 2023-11-27 ENCOUNTER — HOME HEALTH ADMISSION (OUTPATIENT)
Dept: HOME HEALTH SERVICES | Facility: HOME HEALTHCARE | Age: 63
End: 2023-11-27
Payer: MEDICARE

## 2023-11-27 ENCOUNTER — DOCUMENTATION (OUTPATIENT)
Dept: HOME HEALTH SERVICES | Facility: HOME HEALTHCARE | Age: 63
End: 2023-11-27
Payer: MEDICARE

## 2023-11-27 VITALS
HEART RATE: 75 BPM | DIASTOLIC BLOOD PRESSURE: 66 MMHG | WEIGHT: 226 LBS | OXYGEN SATURATION: 97 % | RESPIRATION RATE: 18 BRPM | HEIGHT: 66 IN | TEMPERATURE: 95.9 F | BODY MASS INDEX: 36.32 KG/M2 | SYSTOLIC BLOOD PRESSURE: 101 MMHG

## 2023-11-27 LAB
ANION GAP SERPL CALCULATED.3IONS-SCNC: 11 MMOL/L (ref 5–15)
BASOPHILS # BLD AUTO: 0 10*3/MM3 (ref 0–0.2)
BASOPHILS NFR BLD AUTO: 0.8 % (ref 0–1.5)
BUN SERPL-MCNC: 10 MG/DL (ref 8–23)
BUN/CREAT SERPL: 11.4 (ref 7–25)
CALCIUM SPEC-SCNC: 8.1 MG/DL (ref 8.6–10.5)
CHLORIDE SERPL-SCNC: 105 MMOL/L (ref 98–107)
CO2 SERPL-SCNC: 25 MMOL/L (ref 22–29)
CREAT SERPL-MCNC: 0.88 MG/DL (ref 0.57–1)
DEPRECATED RDW RBC AUTO: 43.3 FL (ref 37–54)
EGFRCR SERPLBLD CKD-EPI 2021: 74.9 ML/MIN/1.73
EOSINOPHIL # BLD AUTO: 0.2 10*3/MM3 (ref 0–0.4)
EOSINOPHIL NFR BLD AUTO: 4.9 % (ref 0.3–6.2)
ERYTHROCYTE [DISTWIDTH] IN BLOOD BY AUTOMATED COUNT: 14.6 % (ref 12.3–15.4)
GLUCOSE BLDC GLUCOMTR-MCNC: 112 MG/DL (ref 70–105)
GLUCOSE BLDC GLUCOMTR-MCNC: 93 MG/DL (ref 70–105)
GLUCOSE BLDC GLUCOMTR-MCNC: 95 MG/DL (ref 70–105)
GLUCOSE SERPL-MCNC: 96 MG/DL (ref 65–99)
HCT VFR BLD AUTO: 31.5 % (ref 34–46.6)
HGB BLD-MCNC: 10.5 G/DL (ref 12–15.9)
LYMPHOCYTES # BLD AUTO: 1.1 10*3/MM3 (ref 0.7–3.1)
LYMPHOCYTES NFR BLD AUTO: 30.1 % (ref 19.6–45.3)
MCH RBC QN AUTO: 28.2 PG (ref 26.6–33)
MCHC RBC AUTO-ENTMCNC: 33.2 G/DL (ref 31.5–35.7)
MCV RBC AUTO: 85 FL (ref 79–97)
MONOCYTES # BLD AUTO: 0.4 10*3/MM3 (ref 0.1–0.9)
MONOCYTES NFR BLD AUTO: 12.5 % (ref 5–12)
NEUTROPHILS NFR BLD AUTO: 1.8 10*3/MM3 (ref 1.7–7)
NEUTROPHILS NFR BLD AUTO: 51.7 % (ref 42.7–76)
NRBC BLD AUTO-RTO: 0 /100 WBC (ref 0–0.2)
PLATELET # BLD AUTO: 208 10*3/MM3 (ref 140–450)
PMV BLD AUTO: 9 FL (ref 6–12)
POTASSIUM SERPL-SCNC: 4 MMOL/L (ref 3.5–5.2)
RBC # BLD AUTO: 3.71 10*6/MM3 (ref 3.77–5.28)
SODIUM SERPL-SCNC: 141 MMOL/L (ref 136–145)
WBC NRBC COR # BLD AUTO: 3.5 10*3/MM3 (ref 3.4–10.8)

## 2023-11-27 PROCEDURE — 80048 BASIC METABOLIC PNL TOTAL CA: CPT | Performed by: NURSE PRACTITIONER

## 2023-11-27 PROCEDURE — 94618 PULMONARY STRESS TESTING: CPT

## 2023-11-27 PROCEDURE — 82948 REAGENT STRIP/BLOOD GLUCOSE: CPT

## 2023-11-27 PROCEDURE — G0378 HOSPITAL OBSERVATION PER HR: HCPCS

## 2023-11-27 PROCEDURE — 85025 COMPLETE CBC W/AUTO DIFF WBC: CPT | Performed by: NURSE PRACTITIONER

## 2023-11-27 PROCEDURE — 97162 PT EVAL MOD COMPLEX 30 MIN: CPT

## 2023-11-27 RX ADMIN — LOSARTAN POTASSIUM 100 MG: 50 TABLET, FILM COATED ORAL at 09:42

## 2023-11-27 RX ADMIN — DOCUSATE SODIUM 50 MG AND SENNOSIDES 8.6 MG 2 TABLET: 8.6; 5 TABLET, FILM COATED ORAL at 09:41

## 2023-11-27 RX ADMIN — BUPROPION HYDROCHLORIDE 300 MG: 150 TABLET, EXTENDED RELEASE ORAL at 09:41

## 2023-11-27 RX ADMIN — Medication 10 ML: at 09:41

## 2023-11-27 RX ADMIN — CETIRIZINE HYDROCHLORIDE 10 MG: 10 TABLET, FILM COATED ORAL at 09:42

## 2023-11-27 RX ADMIN — GUAIFENESIN 1200 MG: 600 TABLET, MULTILAYER, EXTENDED RELEASE ORAL at 09:42

## 2023-11-27 RX ADMIN — GABAPENTIN 600 MG: 600 TABLET, FILM COATED ORAL at 17:27

## 2023-11-27 RX ADMIN — VILAZODONE HYDROCHLORIDE 20 MG: 40 TABLET, FILM COATED ORAL at 06:08

## 2023-11-27 RX ADMIN — NIRMATRELVIR AND RITONAVIR 3 TABLET: KIT at 09:43

## 2023-11-27 RX ADMIN — GABAPENTIN 600 MG: 600 TABLET, FILM COATED ORAL at 09:42

## 2023-11-27 NOTE — THERAPY EVALUATION
Patient Name: Monica Fitzpatrick  : 1960    MRN: [id number]                              Today's Date: 2023       Admit Date: 2023    Visit Dx:     ICD-10-CM ICD-9-CM   1. COVID  U07.1 079.89   2. Nausea and vomiting, unspecified vomiting type  R11.2 787.01   3. Pharyngitis, unspecified etiology  J02.9 462   4. Myalgia  M79.10 729.1   5. Hypokalemia  E87.6 276.8   6. Dyspnea, unspecified type  R06.00 786.09   7. Weakness  R53.1 780.79     Patient Active Problem List   Diagnosis    Anxiety state    Chronic low back pain    Degeneration of intervertebral disc of lumbar region    Family history of malignant neoplasm of colon    Fibromyalgia    High alkaline phosphatase    Hives of unknown origin    Hypertension    Hypokalemia    Leukoplakia of oral mucosa    Lumbar spondylosis    Lumbar radiculopathy    MRSA infection    Obesity with body mass index 30 or greater    Presence of right artificial knee joint    Tympanic membrane perforation    Type 2 diabetes mellitus with hyperglycemia    Vitamin D deficiency    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    Morbid obesity    Abnormal EKG     Past Medical History:   Diagnosis Date    Anxiety and depression     Diabetes     Dry eye     Hyperlipidemia     Hypertension     PONV (postoperative nausea and vomiting)      Past Surgical History:   Procedure Laterality Date    COLONOSCOPY      ENDOSCOPY      KNEE JOINT MANIPULATION Left 2022    Procedure: KNEE MANIPULATION;  Surgeon: Hank Coles MD;  Location: [facility] MAIN OR;  Service: Orthopedics;  Laterality: Left;    REPLACEMENT TOTAL KNEE Left     [place]    TOTAL KNEE ARTHROPLASTY Right     TOTAL KNEE ARTHROPLASTY REVISION Left 2022    Procedure: TOTAL KNEE ARTHROPLASTY REVISION;  Surgeon: Hank Coles MD;  Location: [facility] MAIN OR;  Service: Orthopedics;  Laterality: Left;    WRIST SURGERY Bilateral     plate and screws      General Information       Row Name  11/27/23 1504          Physical Therapy Time and Intention    Document Type evaluation  -EL     Mode of Treatment individual therapy;physical therapy  -       Row Name 11/27/23 1504          General Information    Prior Level of Function independent:;all household mobility;ADL's  -EL       Row Name 11/27/23 1504          Living Environment    People in Home alone  -       Row Name 11/27/23 1504          Home Main Entrance    Number of Stairs, Main Entrance none  -EL       Row Name 11/27/23 1504          Stairs Within Home, Primary    Number of Stairs, Within Home, Primary none  -EL       Row Name 11/27/23 1504          Cognition    Orientation Status (Cognition) oriented x 4  -EL       Row Name 11/27/23 1504          Safety Issues, Functional Mobility    Impairments Affecting Function (Mobility) endurance/activity tolerance;strength  -EL               User Key  (r) = Recorded By, (t) = Taken By, (c) = Cosigned By      Initials Name Provider Type    Yasmani Gudino, STAN Physical Therapist                   Mobility       Row Name 11/27/23 1510          Bed-Chair Transfer    Bed-Chair [place] (Transfers) standby assist  -EL       Row Name 11/27/23 1510          Sit-Stand Transfer    Sit-Stand [place] (Transfers) standby assist  -EL       Row Name 11/27/23 1510          Gait/Stairs (Locomotion)    [place] Level (Gait) contact guard  -EL     Distance in Feet (Gait) 50  -EL     Deviations/Abnormal Patterns (Gait) gait speed decreased  -EL     Comment, (Gait/Stairs) Minor SOA following desat to 87% on RA.  -EL               User Key  (r) = Recorded By, (t) = Taken By, (c) = Cosigned By      Initials Name Provider Type    Yasmani Gudino PT Physical Therapist                   Obj/Interventions       Row Name 11/27/23 1511          Range of Motion Comprehensive    General Range of Motion bilateral lower extremity ROM WFL  -EL       Row Name 11/27/23 1511          Strength Comprehensive (MMT)    General  Manual Muscle Testing (MMT) Assessment lower extremity strength deficits identified  -EL     Comment, General Manual Muscle Testing (MMT) Assessment BLE 4-/5 gross  -EL       Row Name 11/27/23 1511          Balance    Balance Assessment sitting static balance;standing static balance;standing dynamic balance  -EL     Static Sitting Balance independent  -EL     Static Standing Balance contact guard  -EL     Dynamic Standing Balance contact guard  -EL       Row Name 11/27/23 1511          Sensory Assessment (Somatosensory)    Sensory Assessment (Somatosensory) sensation intact  -EL               User Key  (r) = Recorded By, (t) = Taken By, (c) = Cosigned By      Initials Name Provider Type    Yasmani Gudino, PT Physical Therapist                   Goals/Plan       Row Name 11/27/23 1514          Bed Mobility Goal 1 (PT)    Activity/Assistive Device (Bed Mobility Goal 1, PT) bed mobility activities, all  -EL     [place] Level/Cues Needed (Bed Mobility Goal 1, PT) modified independence  -EL     Time Frame (Bed Mobility Goal 1, PT) long term goal (LTG);2 weeks  -EL       Row Name 11/27/23 1514          Transfer Goal 1 (PT)    Activity/Assistive Device (Transfer Goal 1, PT) transfers, all;walker, rolling  -EL     [place] Level/Cues Needed (Transfer Goal 1, PT) modified independence  -EL     Time Frame (Transfer Goal 1, PT) long term goal (LTG);2 weeks  -EL       Row Name 11/27/23 1514          Gait Training Goal 1 (PT)    Activity/Assistive Device (Gait Training Goal 1, PT) gait (walking locomotion);walker, rolling  -EL     [place] Level (Gait Training Goal 1, PT) modified independence  -EL     Distance (Gait Training Goal 1, PT) 150  -EL     Time Frame (Gait Training Goal 1, PT) long term goal (LTG);2 weeks  -EL       Row Name 11/27/23 1514          Therapy Assessment/Plan (PT)    Planned Therapy Interventions (PT) balance training;neuromuscular re-education;bed mobility training;transfer training;gait  training;patient/family education;strengthening  -EL               User Key  (r) = Recorded By, (t) = Taken By, (c) = Cosigned By      Initials Name Provider Type    Yasmani Gudino, STAN Physical Therapist                   Clinical Impression       Row Name 11/27/23 1512          Pain    Pretreatment Pain Rating 0/10 - no pain  -EL     Posttreatment Pain Rating 0/10 - no pain  -EL       Row Name 11/27/23 1512          Plan of Care Review    Plan of Care Reviewed With patient  -EL     Outcome Evaluation Pt is a 64 YO F admitted with sore throat, n/v, SOA, COVID (+). Pt reports living home alone, since her  went to memory care. Pt states she typically is independent with all ADLs, ambulation without AD in home and RWx in community. Pt states she has had no recent falls. On RA when PT entered, ambulated in room and desat to 87%. Pt likely safe to return home at d/c, recommendation is HHPT and pt likely to need 6MWT prior to d/c.  -EL       Row Name 11/27/23 1512          Therapy Assessment/Plan (PT)    Rehab Potential (PT) good, to achieve stated therapy goals  -EL     Criteria for Skilled Interventions Met (PT) yes  -EL     Therapy Frequency (PT) 3 times/wk  -EL     Predicted Duration of Therapy Intervention (PT) Until d/c  -EL       Row Name 11/27/23 1512          Vital Signs    O2 Delivery Pre Treatment room air  -EL     O2 Delivery Intra Treatment room air  -EL     O2 Delivery Post Treatment supplemental O2  -EL     Pre Patient Position Standing  -EL     Intra Patient Position Standing  -EL     Post Patient Position Supine  -EL       Row Name 11/27/23 1512          Positioning and Restraints    Pre-Treatment Position in bed  -EL     Post Treatment Position bed  -EL     In Bed notified nsg;supine;call light within reach;encouraged to call for assist;exit alarm on  -EL               User Key  (r) = Recorded By, (t) = Taken By, (c) = Cosigned By      Initials Name Provider Type    Yasmani Gudino, PT Physical  Therapist                   Outcome Measures       Row Name 11/27/23 1515 11/27/23 0800       How much help from another person do you currently need...    Turning from your back to your side while in flat bed without using bedrails? 4  -EL 3  -CW    Moving from lying on back to sitting on the side of a flat bed without bedrails? 3  -EL 3  -CW    Moving to and from a bed to a chair (including a wheelchair)? 3  -EL 3  -CW    Standing up from a chair using your arms (e.g., wheelchair, bedside chair)? 3  -EL 3  -CW    Climbing 3-5 steps with a railing? 3  -EL 3  -CW    To walk in hospital room? 3  -EL 3  -CW    AM-PAC 6 Clicks Score (PT) 19  -EL 18  -CW    Highest Level of Mobility Goal 6 --> Walk 10 steps or more  -EL 6 --> Walk 10 steps or more  -CW      Row Name 11/27/23 1515          Functional Assessment    Outcome Measure Options AM-PAC 6 Clicks Basic Mobility (PT)  -               User Key  (r) = Recorded By, (t) = Taken By, (c) = Cosigned By      Initials Name Provider Type    EL Yasmani Jung, PT Physical Therapist    CW Daryln Lim, RN Registered Nurse                                 Physical Therapy Education       Title: PT OT SLP Therapies (Done)       Topic: Physical Therapy (Done)       Point: Mobility training (Done)       Learning Progress Summary             Patient Acceptance, E,TB, VU by  at 11/27/2023 1516                         Point: Precautions (Done)       Learning Progress Summary             Patient Acceptance, E,TB, VU by  at 11/27/2023 1516                                         User Key       Initials Effective Dates Name Provider Type Discipline     06/23/20 -  Yasmani Jung, PT Physical Therapist PT                  PT Recommendation and Plan  Planned Therapy Interventions (PT): balance training, neuromuscular re-education, bed mobility training, transfer training, gait training, patient/family education, strengthening  Plan of Care Reviewed With: patient  Outcome Evaluation: Pt  is a 64 YO F admitted with sore throat, n/v, SOA, COVID (+). Pt reports living home alone, since her  went to memory care. Pt states she typically is independent with all ADLs, ambulation without AD in home and RWx in community. Pt states she has had no recent falls. On RA when PT entered, ambulated in room and desat to 87%. Pt likely safe to return home at d/c, recommendation is HHPT and pt likely to need 6MWT prior to d/c.     Time Calculation:   PT Evaluation Complexity  History, PT Evaluation Complexity: 1-2 personal factors and/or comorbidities  Examination of Body Systems (PT Eval Complexity): total of 3 or more elements  Clinical Presentation (PT Evaluation Complexity): evolving  Clinical Decision Making (PT Evaluation Complexity): moderate complexity  Overall Complexity (PT Evaluation Complexity): moderate complexity     PT Charges       Row Name 11/27/23 1517             Time Calculation    Start Time 1300  -EL      Stop Time 1320  -EL      Time Calculation (min) 20 min  -EL      PT Received On 11/27/23  -EL      PT - Next Appointment 11/29/23  -EL      PT Goal Re-Cert Due Date 12/11/23  -EL                User Key  (r) = Recorded By, (t) = Taken By, (c) = Cosigned By      Initials Name Provider Type    Yasmani Gudino PT Physical Therapist                  Therapy Charges for Today       Code Description Service Date Service Provider Modifiers Qty    [id number]  PT EVAL MOD COMPLEXITY 4 11/27/2023 Yasmani Jung, PT GP 1            PT G-Codes  Outcome Measure Options: AM-PAC 6 Clicks Basic Mobility (PT)  AM-PAC 6 Clicks Score (PT): 19  PT Discharge Summary  Anticipated Discharge Disposition (PT): home with home health    Yasmani Jung PT  11/27/2023

## 2023-11-27 NOTE — DISCHARGE SUMMARY
[facility] Observation Unit Progress Note    Patient Name: Monica Fitzpatrick  : 1960  MRN: [id number]  Primary Care Physician: Chaya Dorsey MD  Date of admission: 2023     Patient Care Team:  Chaya Dorsey MD as PCP - General (Family Medicine)          Subjective   History Present Illness     Chief Complaint:   Chief Complaint   Patient presents with    Headache     Headache     Headache      ED 2023  63-year-old female presents the ED via EMS with chief complaint of sore throat onset 4 days ago.  Saw her PCP 4 days ago but was not started on anything.  Today she presents with nausea, an episode of vomiting bile, myalgias, and sinus pressure.  She denies any fever, chest pain, SOA, abdominal pain, and diarrhea.      Observation 2023  Patient agrees with HPI noted above including productive cough and congestion with onset .  Denies any shortness of breath currently but states she does not feel well enough to go home today.  Patient currently room air.  Patient would like to start COVID antiviral.        Observation 2023  Patient states she is feeling much better today.  PT evaluated patient and recommended home health PT.  Per walk oximetry patient does not need O2 at discharge.    Review of Systems   Neurological:  Positive for headaches.     Review of Systems   Constitutional:  Positive for diaphoresis. Negative for chills and fever.   HENT:  Positive for congestion, sinus pressure and sore throat.    Respiratory:  Negative for chest tightness and shortness of breath.    Cardiovascular:  Negative for chest pain and palpitations.   Gastrointestinal:  Positive for nausea and vomiting. Negative for abdominal pain and diarrhea.   Musculoskeletal:  Positive for myalgias.   All other systems reviewed and are negative.         Personal History     Past Medical History:   Past Medical History:   Diagnosis Date    Anxiety and depression     Diabetes     Dry eye      Hyperlipidemia     Hypertension     PONV (postoperative nausea and vomiting)        Surgical History:      Past Surgical History:   Procedure Laterality Date    COLONOSCOPY      ENDOSCOPY      KNEE JOINT MANIPULATION Left 8/16/2022    Procedure: KNEE MANIPULATION;  Surgeon: Hank Coles MD;  Location: [facility] MAIN OR;  Service: Orthopedics;  Laterality: Left;    REPLAC[place]EE [place]    TOTAL KNEE ARTHROPLASTY Right     TOTAL KNEE ARTHROPLASTY REVISION Left 05/24/2022    Procedure: TOTAL KNEE ARTHROPLASTY REVISION;  Surgeon: Hank Coles MD;  Location: [facility] MAIN OR;  Service: Orthopedics;  Laterality: Left;    WRIST SURGERY Bilateral     plate and screws           Family History: family history includes Alcohol abuse in her brother and father; Diabetes in her father, maternal grandmother, and mother; Drug abuse in her sister; Heart attack in her father; Heart disease in her father. Otherwise pertinent FHx was reviewed and unremarkable.     Social History:  reports that she has never smoked. She has never been exposed to tobacco smoke. She has never used smokeless tobacco. She reports that she does not drink alcohol and does not use drugs.      Medications:  Prior to Admission medications    Medication Sig Start Date End Date Taking? Authorizing Provider   albuterol sulfate  (90 Base) MCG/ACT inhaler Inhale 2 puffs Every 6 (Six) Hours As Needed.   Yes Renetta Rico MD   atorvastatin (LIPITOR) 20 MG tablet Take 1 tablet by mouth Every Night. 2/15/22  Yes Renetta Rico MD   buPROPion XL (WELLBUTRIN XL) 300 MG 24 hr tablet Take 1 tablet by mouth Every Morning.   Yes Renetta Rico MD   chlorthalidone (HYGROTON) 25 MG tablet Take 1 tablet by mouth Daily.   Yes Renetta Rico MD   Cyanocobalamin (B-12) 1000 MCG capsule Take 1,000 mcg by mouth Daily. LD 8/10 4/25/17  Yes Renetta Rico MD   cyclobenzaprine (FLEXERIL) 10 MG tablet Take 0.5 tablets by  mouth 3 (Three) Times a Day As Needed for Muscle Spasms.   Yes Rneetta Rico MD   cycloSPORINE (RESTASIS) 0.05 % ophthalmic emulsion Administer 1 drop to both eyes Every 12 (Twelve) Hours.   Yes Renetta Rico MD   fluticasone (FLONASE) 50 MCG/ACT nasal spray 2 sprays into the nostril(s) as directed by provider Daily As Needed for Rhinitis.   Yes Renetta Rico MD   gabapentin (NEURONTIN) 600 MG tablet Take 1 tablet by mouth 3 (Three) Times a Day. 2/15/22  Yes Renetta Rico MD   hydrOXYzine (ATARAX) 25 MG tablet Take 1 tablet by mouth Every 8 (Eight) Hours As Needed.   Yes Renetta Rico MD   levocetirizine (XYZAL) 5 MG tablet Take 1 tablet by mouth Daily.   Yes Renetta Rico MD   losartan (COZAAR) 100 MG tablet Take 1 tablet by mouth every night at bedtime. 10/26/17  Yes Renetta Rico MD   meloxicam (MOBIC) 15 MG tablet TAKE 1 TABLET EVERY DAY 10/11/23  Yes Hank Coles MD   multivitamin with minerals tablet tablet Take 1 tablet by mouth Daily. LD 8/11   Yes Renetta Rico MD Nirmatrelvir&Ritonavir 300/100 (PAXLOVID) 20 x 150 MG & 10 x 100MG tablet therapy pack tablet Take 3 tablets by mouth 2 (Two) Times a Day for 7 doses. 11/27/23 12/1/23 Yes Shanna Martin APRN   Semaglutide,0.25 or 0.5MG/DOS, (Ozempic, 0.25 or 0.5 MG/DOSE,) 2 MG/1.5ML solution pen-injector Inject 1 mg under the skin into the appropriate area as directed 1 (One) Time Per Week. Wednesdays   Yes Renetta Rico MD   Viibryd 40 MG tablet tablet Take 1 tablet by mouth Every Morning. 2/15/22  Yes Renetta Rico MD Nirmatrelvir&Ritonavir 300/100 (PAXLOVID) 20 x 150 MG & 10 x 100MG tablet therapy pack tablet Take 3 tablets by mouth 2 (Two) Times a Day for 7 doses. 11/26/23 11/27/23 Yes Shanna Martin APRN   FLUoxetine (PROzac) 60 MG tablet Daily. 11/13/17 5/24/22  Provider, MD Renetta       Allergies:    Allergies   Allergen Reactions    Sulfa Antibiotics  Itching, Nausea And Vomiting, Nausea Only and Rash    Tramadol Itching, Nausea Only, Rash and GI Intolerance       Objective   Objective     Vital Signs  Temp:  [95.9 °F (35.5 °C)-98.1 °F (36.7 °C)] 95.9 °F (35.5 °C)  Heart Rate:  [62-80] 75  Resp:  [13-19] 18  BP: (101-118)/(53-69) 101/66  SpO2:  [92 %-100 %] 97 %  on  Flow (L/min):  [2] 2;   Device (Oxygen Therapy): room air  Body mass index is 36.48 kg/m².    Physical Exam  Vitals and nursing note reviewed.   Constitutional:       Appearance: Normal appearance. She is obese.   HENT:      Head: Normocephalic and atraumatic.      Right Ear: External ear normal.      Left Ear: External ear normal.      Nose: Nose normal.      Mouth/Throat:      Mouth: Mucous membranes are moist.      Pharynx: Oropharynx is clear.   Eyes:      Extraocular Movements: Extraocular movements intact.   Cardiovascular:      Rate and Rhythm: Normal rate and regular rhythm.      Pulses: Normal pulses.      Heart sounds: Normal heart sounds.   Pulmonary:      Effort: Pulmonary effort is normal.      Breath sounds: Normal breath sounds.   Abdominal:      General: Abdomen is flat. Bowel sounds are normal.      Palpations: Abdomen is soft.   Musculoskeletal:         General: Normal range of motion.      Cervical back: Normal range of motion.   Skin:     General: Skin is warm.   Neurological:      General: No focal deficit present.      Mental Status: She is alert and oriented to person, place, and time.   Psychiatric:         Mood and Affect: Mood normal.         Behavior: Behavior normal.           Results Review:  I have personally reviewed most recent lab results and radiology images and interpretations and agree with findings, most notably:CMP, CBC, chest x-ray, strep and respiratory panel.   .    Results from last 7 days   Lab Units 11/27/23  0621   WBC 10*3/mm3 3.50   HEMOGLOBIN g/dL 10.5*   HEMATOCRIT % 31.5*   PLATELETS 10*3/mm3 208     Results from last 7 days   Lab Units 11/27/23  0621  11/25/23  0433 11/24/23  1436   SODIUM mmol/L 141   < > 135*   POTASSIUM mmol/L 4.0   < > 3.2*   CHLORIDE mmol/L 105   < > 98   CO2 mmol/L 25.0   < > 24.0   BUN mg/dL 10   < > 11   CREATININE mg/dL 0.88   < > 0.94   GLUCOSE mg/dL 96   < > 135*   CALCIUM mg/dL 8.1*   < > 9.3   ALK PHOS U/L  --   --  114   ALT (SGPT) U/L  --   --  9   AST (SGOT) U/L  --   --  15    < > = values in this interval not displayed.     Estimated Creatinine Clearance: 81.4 mL/min (by C-G formula based on SCr of 0.88 mg/dL).  Brief Urine Lab Results       None            Microbiology Results (last 10 days)       Procedure Component Value - Date/Time    Respiratory Panel PCR w/COVID-19(SARS-CoV-2) YAIR/KEVIN/JESSIE/PAD/COR/TANGELA In-House, NP Swab in UTM/VTM, 2 HR TAT - Swab, Nasopharynx [[id number]]  (Abnormal) Collected: 11/24/23 1445    Lab Status: Final result Specimen: Swab from Nasopharynx Updated: 11/24/23 1601     ADENOVIRUS, PCR Not Detected     Coronavirus 229E Not Detected     Coronavirus HKU1 Not Detected     Coronavirus NL63 Not Detected     Coronavirus OC43 Not Detected     COVID19 Detected     Human Metapneumovirus Not Detected     Human Rhinovirus/Enterovirus Not Detected     Influenza A PCR Not Detected     Influenza B PCR Not Detected     Parainfluenza Virus 1 Not Detected     Parainfluenza Virus 2 Not Detected     Parainfluenza Virus 3 Not Detected     Parainfluenza Virus 4 Not Detected     RSV, PCR Not Detected     Bordetella pertussis pcr Not Detected     Bordetella parapertussis PCR Not Detected     Chlamydophila pneumoniae PCR Not Detected     Mycoplasma pneumo by PCR Not Detected    Narrative:      In the setting of a positive respiratory panel with a viral infection PLUS a negative procalcitonin without other underlying concern for bacterial infection, consider observing off antibiotics or discontinuation of antibiotics and continue supportive care. If the respiratory panel is positive for atypical bacterial infection  (Bordetella pertussis, Chlamydophila pneumoniae, or Mycoplasma pneumoniae), consider antibiotic de-escalation to target atypical bacterial infection.    Rapid Strep A Screen - Swab, Throat [[id number]]  (Normal) Collected: 11/24/23 1445    Lab Status: Final result Specimen: Swab from Throat Updated: 11/24/23 1517     Strep A Ag Negative            ECG/EMG Results (most recent)       Procedure Component Value Units Date/Time    SCANNED - TELEMETRY   [[id number]] Resulted: 11/24/23     Updated: 11/25/23 0024    SCANNED - TELEMETRY   [[id number]] Resulted: 11/24/23     Updated: 11/25/23 0024    SCANNED - TELEMETRY   [[id number]] Resulted: 11/24/23     Updated: 11/25/23 0245    SCANNED - TELEMETRY   [[id number]] Resulted: 11/24/23     Updated: 11/25/23 1823    SCANNED - TELEMETRY   [[id number]] Resulted: 11/24/23     Updated: 11/25/23 1823    SCANNED - TELEMETRY   [[id number]] Resulted: 11/24/23     Updated: 11/25/23 1823    SCANNED - TELEMETRY   [[id number]] Resulted: 11/24/23     Updated: 11/25/23 1823    SCANNED - TELEMETRY   [[id number]] Resulted: 11/24/23     Updated: 11/25/23 1827    SCANNED - TELEMETRY   [[id number]] Resulted: 11/24/23     Updated: 11/25/23 2006    SCANNED - TELEMETRY   [[id number]] Resulted: 11/24/23     Updated: 11/25/23 2128    SCANNED - TELEMETRY   [[id number]] Resulted: 11/24/23     Updated: 11/27/23 0701    SCANNED - TELEMETRY   [[id number]] Resulted: 11/24/23     Updated: 11/27/23 0747    SCANNED - TELEMETRY   [[id number]] Resulted: 11/24/23     Updated: 11/27/23 0830    SCANNED - TELEMETRY   [[id number]] Resulted: 11/24/23     Updated: 11/27/23 0836    SCANNED - TELEMETRY   [[id number]] Resulted: 11/24/23     Updated: 11/27/23 1159    SCANNED - TELEMETRY   [[id number]] Resulted: 11/24/23     Updated: 11/27/23 1325                Results for orders placed during the hospital encounter of 05/13/22    Adult Transthoracic Echo Complete W/ Cont if Necessary Per Protocol    Interpretation Summary  · Left  ventricular ejection fraction is 55 to 60%  · Left ventricular systolic function is normal.  · Mild dilation of the aortic root is present.  · Left ventricular diastolic function is consistent with (grade II w/high LAP) pseudonormalization.      XR Chest 1 View    Result Date: 11/24/2023  Impression: Cardiomegaly with central pulmonary vascular congestion. There is mild perihilar haziness which may indicate early pulmonary edema/CHF pattern versus atypical infection pattern. Correlate clinically. Electronically Signed: Brennen Chavarria MD  11/24/2023 2:55 PM EST  Workstation ID: [id number]       Estimated Creatinine Clearance: 81.4 mL/min (by C-G formula based on SCr of 0.88 mg/dL).    Assessment & Plan   Assessment/Plan       Active Hospital Problems   No active problems to display.      Resolved Hospital Problems    Diagnosis Date Resolved POA    **Dyspnea [R06.00] 11/26/2023 Yes            Dyspnea due to COVID  -CBC generally unremarkable  -Chest x-ray showed cardiomegaly and early pulmonary edema  -Respiratory panel positive for COVID-19  -In the ED patient received albuterol, Toradol and Zofran  -Paxlovid ordered  -O2 as needed  -Lovenox prophylactic ordered  -Continue Wellbutrin  -PT consulted and recommended home health PT  -Walk oximetry showed that patient does not need oxygen at home.    Hypokalemia  -Potassium 3.2 on admission.  -Received 40 mEq of potassium in the ED  -Electrolyte replacement protocol ordered  -Potassium 4.0 today  -Magnesium and within normal range       Hypertension  -Well controlled   BP Readings from Last 1 Encounters:   11/27/23 101/66   - Continue Cozaar  - Monitor while admitted      Hyperlipidemia  -Hold Lipitor due to Paxlovid  -Restart Lipitor after completing Paxlovid     Depression/anxiety  -Viibryd ordered at decreased dose due to Paxlovid  -Continue Viibryd 20 mg.  Restart 40 mg of Viibryd once Paxlovid is complete     Obesity  -BMI 36.48  -Lifestyle modifications          VTE  Prophylaxis -   Mechanical Order History:        Ordered        11/24/23 1822  Place Sequential Compression Device  Once            11/24/23 1822  Maintain Sequential Compression Device  Continuous                          Pharmalogical Order History:        Ordered     Dose Route Frequency Stop    11/25/23 0943  Enoxaparin Sodium (LOVENOX) syringe 40 mg         40 mg SC Daily --                    CODE STATUS:    Code Status and Medical Interventions:   Ordered at: 11/24/23 1617     Level Of Support Discussed With:    Patient     Code Status (Patient has no pulse and is not breathing):    CPR (Attempt to Resuscitate)     Medical Interventions (Patient has pulse or is breathing):    Full Support       This patient has been examined wearing personal protective equipment.     I discussed the patient's findings and my recommendations with patient and nursing staff.      Signature:Electronically signed by LISSETTE Orr, 11/27/23, 2:51 PM EST.

## 2023-11-27 NOTE — PROGRESS NOTES
Demographics verified. Pt is agreeable to services and has no other agency    Nursing, PT, and Poss OT    Spoke with [facility]. Zarina Askew,NP is agreeable to sign the plan of care and follow for home health orders    Ph[facility]: [facility] [place]

## 2023-11-27 NOTE — PROGRESS NOTES
Exercise Oximetry    Patient Name:Monica Fitzpatrick   MRN: [id number]   Date: 11/27/23             ROOM AIR BASELINE   SpO2% 94% room air   Heart Rate    Blood Pressure      EXERCISE ON ROOM AIR SpO2% EXERCISE ON O2 @  LPM SpO2%   1 MINUTE 94 1 MINUTE    2 MINUTES 94 2 MINUTES    3 MINUTES 95 3 MINUTES    4 MINUTES 96 4 MINUTES    5 MINUTES 96 5 MINUTES    6 MINUTES 94 6 MINUTES               Distance Walked   Distance Walked   Dyspnea (Nathaniel Scale)   Dyspnea (Nathaniel Scale)   Fatigue (Nathaniel Scale)   Fatigue (Nathaniel Scale)   SpO2% Post Exercise  95% room air SpO2% Post Exercise   HR Post Exercise   HR Post Exercise   Time to Recovery   Time to Recovery     Comments:

## 2023-11-27 NOTE — PLAN OF CARE
Goal Outcome Evaluation:  Plan of Care Reviewed With: patient           Outcome Evaluation: Pt is a 62 YO F admitted with sore throat, n/v, SOA, COVID (+). Pt reports living home alone, since her  went to memory care. Pt states she typically is independent with all ADLs, ambulation without AD in home and RWx in community. Pt states she has had no recent falls. On RA when PT entered, ambulated in room and desat to 87%. Pt likely safe to return home at d/c, recommendation is HHPT and pt likely to need 6MWT prior to d/c.      Anticipated Discharge Disposition (PT): home with home health

## 2023-11-27 NOTE — PLAN OF CARE
Problem: Diabetes Comorbidity  Goal: Blood Glucose Level Within Targeted Range  Outcome: Ongoing, Progressing     Problem: Fall Injury Risk  Goal: Absence of Fall and Fall-Related Injury  Outcome: Ongoing, Progressing     Problem: Adult Inpatient Plan of Care  Goal: Plan of Care Review  Outcome: Ongoing, Progressing  Goal: Patient-Specific Goal (Individualized)  Outcome: Ongoing, Progressing  Goal: Absence of Hospital-Acquired Illness or Injury  Outcome: Ongoing, Progressing  Goal: Optimal Comfort and Wellbeing  Outcome: Ongoing, Progressing  Goal: Readiness for Transition of Care  Outcome: Ongoing, Progressing   Goal Outcome Evaluation:

## 2023-11-27 NOTE — DISCHARGE PLACEMENT REQUEST
"Monica Fam (63 y.o. Female)       Date of Birth   1960    Social Security Number       Address   [address] [place] [address] [place] [address]    Home Phone   [phone]    MRN   [id number]       Christianity   [employer]    Marital Status                               Admission Date   11/24/23    Admission Type   Emergency    Admitting Provider   Iam Mancilla MD    Attending Provider   Les Mancilla MD    Department, Room/Bed   [facility] OBSERVATION, 229/1       Discharge Date       Discharge Disposition   Home or Self Care    Discharge Destination                                 Attending Provider: Les Mancilla MD    Allergies: Sulfa Antibiotics, Tramadol    Isolation: Enh Drop/Con   Infection: COVID (confirmed) (11/24/23)   Code Status: CPR    Ht: 167.6 cm (66\")   Wt: 103 kg (226 lb)    Admission Cmt: None   Principal Problem: Dyspnea [R06.00]                   Active Insurance as of 11/24/2023       Primary Coverage       Payor Plan Insurance Group Employer/Plan Group    HUMANA MEDICARE REPLACEMENT HUMANA MEDICARE REPLACEMENT [id number]       Payor Plan Address Payor Plan Phone Number Payor Plan Fax Number Effective Dates    [address] [address] [phone]  5/1/2021 - None Entered    [facility] [address]         Subscriber Name Subscriber Birth Date Member ID       MONICA FAM 1960 [id number]               Secondary Coverage       Payor Plan Insurance Group Employer/Plan Group    [place]        Payor Plan Address Payor Plan Phone Number Payor Plan Fax Number Effective Dates    [address]   3/1/2022 - None Entered    [place] [address]         Subscriber Name Subscriber Birth Date Member ID       MONICA FAM 1960 [id number]                     Emergency Contacts        (Rel.) Home Phone Work Phone Mobile Phone    ZEE KAUFMAN (Son) [phone] -- --              Insurance " Information                  HUMANA MEDICARE REPLACEMENT/HUMANA MEDICARE REPLACEMENT Phone: [phone]    Subscriber: Monica Fam Subscriber#: [id number]    Group#: [id number] Precert#: --        [place] Phone: --    Subscriber: Monica Fam Subscriber#: [id number]    Group#: -- Precert#: --

## 2023-11-28 ENCOUNTER — NURSE TRIAGE (OUTPATIENT)
Dept: CALL CENTER | Facility: HOSPITAL | Age: 63
End: 2023-11-28
Payer: MEDICARE

## 2023-11-28 NOTE — CASE MANAGEMENT/SOCIAL WORK
Case Management Discharge Note      Final Note: Home with [facility]              Home Medical Care Coordination complete.      Service Provider Selected Services Address Phone Fax Patient Preferred    [facility] ,  Home Rehabilitation [address] [facility] [address] [phone] --                 Transportation Services  Private: Car    Final Discharge Disposition Code: 06 - home with home health care

## 2023-11-28 NOTE — TELEPHONE ENCOUNTER
Patient was prescribed Paxlovid at discharge. Prescription not at pharmacy. EMR reviewed. Prescription resent to [facility].    Reason for Disposition   [1] Prescription prescribed recently is not at pharmacy AND [2] triager has access to patient's EMR AND [3] prescription is recorded in the EMR    Additional Information   Negative: [1] Intentional drug overdose AND [2] suicidal thoughts or ideas   Negative: Drug overdose and triager unable to answer question   Negative: Caller requesting a renewal or refill of a medicine patient is currently taking   Negative: Caller requesting information unrelated to medicine   Negative: Caller requesting information about COVID-19 Vaccine   Negative: Caller requesting information about Emergency Contraception   Negative: Caller requesting information about Combined Birth Control Pills   Negative: Caller requesting information about Progestin Birth Control Pills   Negative: Caller requesting information about Post-Op pain or medicines   Negative: Caller requesting a prescription antibiotic (such as Penicillin) for Strep throat and has a positive culture result   Negative: Caller requesting a prescription anti-viral med (such as Tamiflu) and has influenza (flu) symptoms   Negative: Immunization reaction suspected   Negative: Rash while taking a medicine or within 3 days of stopping it   Negative: [1] Asthma and [2] having symptoms of asthma (cough, wheezing, etc.)   Negative: [1] Symptom of illness (e.g., headache, abdominal pain, earache, vomiting) AND [2] more than mild   Negative: Breastfeeding questions about mother's medicines and diet   Negative: MORE THAN A DOUBLE DOSE of a prescription or over-the-counter (OTC) drug   Negative: [1] DOUBLE DOSE (an extra dose or lesser amount) of prescription drug AND [2] any symptoms (e.g., dizziness, nausea, pain, sleepiness)   Negative: [1] DOUBLE DOSE (an extra dose or lesser amount) of over-the-counter (OTC) drug AND [2] any  "symptoms (e.g., dizziness, nausea, pain, sleepiness)   Negative: Took another person's prescription drug   Negative: [1] DOUBLE DOSE (an extra dose or lesser amount) of prescription drug AND [2] NO symptoms  (Exception: A double dose of antibiotics.)   Negative: Diabetes drug error or overdose (e.g., took wrong type of insulin or took extra dose)   Negative: [1] Prescription not at pharmacy AND [2] was prescribed by PCP recently (Exception: Triager has access to EMR and prescription is recorded there. Go to Home Care and confirm for pharmacy.)   Negative: [1] Pharmacy calling with prescription question AND [2] triager unable to answer question   Negative: [1] Caller has URGENT medicine question about med that PCP or specialist prescribed AND [2] triager unable to answer question   Negative: Medicine patch causing local rash or itching   Negative: [1] Caller has medicine question about med NOT prescribed by PCP AND [2] triager unable to answer question (e.g., compatibility with other med, storage)   Negative: Prescription request for new medicine (not a refill)   Negative: [1] Caller has NON-URGENT medicine question about med that PCP prescribed AND [2] triager unable to answer question   Negative: Caller wants to use a complementary or alternative medicine    Answer Assessment - Initial Assessment Questions  1. NAME of MEDICINE: \"What medicine(s) are you calling about?\"      Paxlovid  2. QUESTION: \"What is your question?\" (e.g., double dose of medicine, side effect)      Prescription not at pharmacy  3. PRESCRIBER: \"Who prescribed the medicine?\" Reason: if prescribed by specialist, call should be referred to that group.      Shanna Martin, APRN  4. SYMPTOMS: \"Do you have any symptoms?\" If Yes, ask: \"What symptoms are you having?\"  \"How bad are the symptoms (e.g., mild, moderate, severe)      See chart  5. PREGNANCY:  \"Is there any chance that you are pregnant?\" \"When was your last menstrual period?\"      " No    Protocols used: Medication Question Call-ADULT-

## 2023-11-29 ENCOUNTER — NURSE TRIAGE (OUTPATIENT)
Dept: CALL CENTER | Facility: HOSPITAL | Age: 63
End: 2023-11-29
Payer: MEDICARE

## 2023-11-29 NOTE — TELEPHONE ENCOUNTER
Reason for Disposition  • [1] Caller has medicine question about med NOT prescribed by PCP AND [2] triager unable to answer question (e.g., compatibility with other med, storage)    Additional Information  • Negative: [1] Intentional drug overdose AND [2] suicidal thoughts or ideas  • Negative: Drug overdose and triager unable to answer question  • Negative: Caller requesting a renewal or refill of a medicine patient is currently taking  • Negative: Caller requesting information unrelated to medicine  • Negative: Caller requesting information about COVID-19 Vaccine  • Negative: Caller requesting information about Emergency Contraception  • Negative: Caller requesting information about Combined Birth Control Pills  • Negative: Caller requesting information about Progestin Birth Control Pills  • Negative: Caller requesting information about Post-Op pain or medicines  • Negative: Caller requesting a prescription antibiotic (such as Penicillin) for Strep throat and has a positive culture result  • Negative: Caller requesting a prescription anti-viral med (such as Tamiflu) and has influenza (flu) symptoms  • Negative: Immunization reaction suspected  • Negative: Rash while taking a medicine or within 3 days of stopping it  • Negative: [1] Asthma and [2] having symptoms of asthma (cough, wheezing, etc.)  • Negative: [1] Symptom of illness (e.g., headache, abdominal pain, earache, vomiting) AND [2] more than mild  • Negative: Breastfeeding questions about mother's medicines and diet  • Negative: MORE THAN A DOUBLE DOSE of a prescription or over-the-counter (OTC) drug  • Negative: [1] DOUBLE DOSE (an extra dose or lesser amount) of prescription drug AND [2] any symptoms (e.g., dizziness, nausea, pain, sleepiness)  • Negative: [1] DOUBLE DOSE (an extra dose or lesser amount) of over-the-counter (OTC) drug AND [2] any symptoms (e.g., dizziness, nausea, pain, sleepiness)  • Negative: Took another person's prescription drug  •  "Negative: [1] DOUBLE DOSE (an extra dose or lesser amount) of prescription drug AND [2] NO symptoms  (Exception: A double dose of antibiotics.)  • Negative: Diabetes drug error or overdose (e.g., took wrong type of insulin or took extra dose)  • Negative: [1] Prescription not at pharmacy AND [2] was prescribed by PCP recently (Exception: Triager has access to EMR and prescription is recorded there. Go to Home Care and confirm for pharmacy.)  • Negative: [1] Pharmacy calling with prescription question AND [2] triager unable to answer question  • Negative: [1] Caller has URGENT medicine question about med that PCP or specialist prescribed AND [2] triager unable to answer question  • Negative: Medicine patch causing local rash or itching    Answer Assessment - Initial Assessment Questions  1. NAME of MEDICINE: \"What medicine(s) are you calling about?\"      Paxlovid   2. QUESTION: \"What is your question?\" (e.g., double dose of medicine, side effect)      I did not get this to continue to take and I am supposed to continue it   3. PRESCRIBER: \"Who prescribed the medicine?\" Reason: if prescribed by specialist, call should be referred to that group.      Hospitalist   4. SYMPTOMS: \"Do you have any symptoms?\" If Yes, ask: \"What symptoms are you having?\"  \"How bad are the symptoms (e.g., mild, moderate, severe)      Moderate -covid   5. PREGNANCY:  \"Is there any chance that you are pregnant?\" \"When was your last menstrual period?\"      No    Protocols used: Medication Question Call-ADULT-    "

## 2023-11-29 NOTE — TELEPHONE ENCOUNTER
Pt called because she was d/c'd from hospital with Covid a few days ago and was on Paxlovid at hospital, but she was supposed to get it after leaving hospital and it was not called in or given to her before she left.  I called her PCP office to let them know, and they are putting a note to the patient's MD to see if she needs to continue and have it called in.  Pt notified and will be awaiting call from PCP to see if she needs to continue Paxlovid.

## 2023-11-30 ENCOUNTER — HOME CARE VISIT (OUTPATIENT)
Dept: HOME HEALTH SERVICES | Facility: HOME HEALTHCARE | Age: 63
End: 2023-11-30
Payer: MEDICARE

## 2023-11-30 VITALS
RESPIRATION RATE: 17 BRPM | SYSTOLIC BLOOD PRESSURE: 116 MMHG | TEMPERATURE: 98.3 F | OXYGEN SATURATION: 97 % | HEART RATE: 80 BPM | DIASTOLIC BLOOD PRESSURE: 62 MMHG

## 2023-11-30 PROCEDURE — G0299 HHS/HOSPICE OF RN EA 15 MIN: HCPCS

## 2023-11-30 NOTE — HOME HEALTH
"SOC Note: Pt reports she started to feel sick on Wednesday after she had been outside on Monday and Tuesday in the cold rain. Pt thought it was from this but her symptoms seemed to be getting worse, sore throat, cough, SOA, so she called EMS and was taken to [facility]. Pt tested positive for Covid at that time and was started on paxlovid. Pts symptoms started to improve and she was DC back home with C to follow.     Home Health ordered for: disciplines SN 1w1, 2w1, 1w3, PT and OT evals scheduled    Reason for Hosp/Primary Dx/Co-morbidities: Covid, COPD, depression, HTN, HLD    Focus of Care: Covid-19    Patient's goal(s): \"to get back to normal\"    Current Functional status/mobility/DME: ambulating without assistive device but uses walker as needed when she feels weak    HB status/Living Arrangements: lives alone in 1 story apartment    Skin Integrity/wound status: no issues    Code Status: full    Fall Risk/Safety concerns: high    Medication issues/Concerns: none- pts script for paxlovid was never called in correctly to [facility] so she was unable to finish that treatment and it is too late to finish it now    SDOH Barriers (i.e. caregiver concerns, social isolation, transportation, food insecurity, environment, income etc.)/Need for MSW:  no    Plan for next visit:  CP assess  Covid monitoring and assess  assess pain and falls  assess weakness"

## 2023-11-30 NOTE — Clinical Note
Informational Purposes Only:  As per home health protocol MD must be notified of any MAJOR drug interactions found upon medication reivew. Please advise if any changes should be made to pts medications.    Drug-Drug: FLUoxetine, vilazodone and meloxicam   Toxic effects may be increased with concurrent administration of meloxicam and FLUoxetine, vilazodone. The risk of upper gastrointestinal bleeding may be increased. Patients taking both drugs concurrently should be educated about the signs and symptoms of GI bleeding.    Thank you,   [facility]   phone [phone]  fax [phone]

## 2023-12-01 ENCOUNTER — HOME CARE VISIT (OUTPATIENT)
Dept: HOME HEALTH SERVICES | Facility: HOME HEALTHCARE | Age: 63
End: 2023-12-01
Payer: MEDICARE

## 2023-12-01 PROCEDURE — G0151 HHCP-SERV OF PT,EA 15 MIN: HCPCS

## 2023-12-02 VITALS
HEART RATE: 80 BPM | TEMPERATURE: 98.1 F | DIASTOLIC BLOOD PRESSURE: 80 MMHG | SYSTOLIC BLOOD PRESSURE: 118 MMHG | OXYGEN SATURATION: 97 %

## 2023-12-02 NOTE — HOME HEALTH
"Eval Note Pt referred for home health PT sp hospitalization for COVID 19.    Patient's goal(s):\"to get back to normal\"      Services required to achieve goals: PT    Potential Issues for goal attainment: none    Problems identified: Pt with impaied endurance related to shortness of breath with minimal exertion from COVID 19    Describe the Functional status and safety: I with transfers without AD. Supervision for ambulation without AD x 60. Needed to sit down due to shortness of breath but o2 sats maintained above 90%.    Describe any environmental issues:None    Any equipment needs: None    POC confirmed with patient on 12.1.23    Plan for next visit: thera ex, balance and gait training"

## 2023-12-04 ENCOUNTER — HOME CARE VISIT (OUTPATIENT)
Dept: HOME HEALTH SERVICES | Facility: HOME HEALTHCARE | Age: 63
End: 2023-12-04
Payer: MEDICARE

## 2023-12-04 VITALS
RESPIRATION RATE: 19 BRPM | HEART RATE: 71 BPM | DIASTOLIC BLOOD PRESSURE: 72 MMHG | SYSTOLIC BLOOD PRESSURE: 122 MMHG | OXYGEN SATURATION: 97 % | TEMPERATURE: 97 F

## 2023-12-04 PROCEDURE — G0299 HHS/HOSPICE OF RN EA 15 MIN: HCPCS

## 2023-12-04 NOTE — HOME HEALTH
Routine Visit Note:  pt. sitting in recliner upon SN arrival, states she is feeling better, but still easily fatigued.  Pt. denies falls, medication was reviewed and states she is aware of them, no new medications to discuss.  COVID education performed and reviewed.    Skill/education provided: CP assess, pain assess, falls assess, COVID education, universal precautions discussed    Patient/caregiver response: tolerated well    Plan for next visit: CP assess, pain assess, falls assess, COVID education, DM education    Other pertinent info: n/a

## 2023-12-05 ENCOUNTER — HOME CARE VISIT (OUTPATIENT)
Dept: HOME HEALTH SERVICES | Facility: HOME HEALTHCARE | Age: 63
End: 2023-12-05
Payer: MEDICARE

## 2023-12-05 VITALS — OXYGEN SATURATION: 96 % | HEART RATE: 86 BPM | DIASTOLIC BLOOD PRESSURE: 80 MMHG | SYSTOLIC BLOOD PRESSURE: 120 MMHG

## 2023-12-05 PROCEDURE — G0152 HHCP-SERV OF OT,EA 15 MIN: HCPCS

## 2023-12-06 NOTE — HOME HEALTH
Pt is a 62 yo female who lives in a first floor apt.  Pt recently hospitalized secondary to Covid.      PLOF Pt independent with all ADLs    Currenlty pt independent with all self care.  MAX assist with IADLs      Skilled OT for ther ex/HEP.   Pt and therapist in agreement with goals and poc.      Next visit ther ex    Pt denies any falls or med changes                                  T  T

## 2023-12-07 ENCOUNTER — HOME CARE VISIT (OUTPATIENT)
Dept: HOME HEALTH SERVICES | Facility: HOME HEALTHCARE | Age: 63
End: 2023-12-07
Payer: MEDICARE

## 2023-12-07 VITALS
RESPIRATION RATE: 18 BRPM | SYSTOLIC BLOOD PRESSURE: 124 MMHG | OXYGEN SATURATION: 98 % | TEMPERATURE: 97 F | DIASTOLIC BLOOD PRESSURE: 68 MMHG | HEART RATE: 78 BPM

## 2023-12-07 PROCEDURE — G0299 HHS/HOSPICE OF RN EA 15 MIN: HCPCS

## 2023-12-07 NOTE — HOME HEALTH
Routine Visit Note:  pt. opened door upon SN arrival states states she is feeling ok, pt. has nasal congestion but says she knows she has not been resting enough,  is in a SNF.  Pt. denies pain, denies falls.     Skill/education provided: CP assess, pain assess, falls assess, COVID education    Patient/caregiver response: verbalized education of COVID, med. management    Plan for next visit: CP assess, pain assess, falls assess, COVID education    Other pertinent info: n/a

## 2023-12-08 ENCOUNTER — HOME CARE VISIT (OUTPATIENT)
Dept: HOME HEALTH SERVICES | Facility: HOME HEALTHCARE | Age: 63
End: 2023-12-08
Payer: MEDICARE

## 2023-12-08 VITALS
HEART RATE: 81 BPM | TEMPERATURE: 98 F | OXYGEN SATURATION: 97 % | DIASTOLIC BLOOD PRESSURE: 78 MMHG | SYSTOLIC BLOOD PRESSURE: 110 MMHG

## 2023-12-08 PROCEDURE — G0151 HHCP-SERV OF PT,EA 15 MIN: HCPCS

## 2023-12-08 NOTE — HOME HEALTH
Routine Visit Note:    Skill/education provided: thera ex, balance and gait training.    Patient/caregiver response: Tolerated with fatiue. Voiced understanding of HEP instructions.     Plan for next visit: thera ex, balance and gait training.

## 2023-12-14 ENCOUNTER — HOME CARE VISIT (OUTPATIENT)
Dept: HOME HEALTH SERVICES | Facility: HOME HEALTHCARE | Age: 63
End: 2023-12-14
Payer: MEDICARE

## 2023-12-14 VITALS
DIASTOLIC BLOOD PRESSURE: 84 MMHG | TEMPERATURE: 97.9 F | HEART RATE: 90 BPM | SYSTOLIC BLOOD PRESSURE: 124 MMHG | OXYGEN SATURATION: 96 % | RESPIRATION RATE: 18 BRPM

## 2023-12-14 PROCEDURE — G0299 HHS/HOSPICE OF RN EA 15 MIN: HCPCS

## 2023-12-14 NOTE — HOME HEALTH
Routine Visit Note:  no changes in pt. status since last SN visit, pt. denies falls, denies pain, educated on COVID    Skill/education provided: CP assess, falls assess, pain assess, COVID education    Patient/caregiver response: verbalized understanding of s/s of SOB    Plan for next visit: CP assess, falls assess, discharge from [facility]    Other pertinent info: n/a

## 2023-12-15 ENCOUNTER — HOME CARE VISIT (OUTPATIENT)
Dept: HOME HEALTH SERVICES | Facility: HOME HEALTHCARE | Age: 63
End: 2023-12-15
Payer: MEDICARE

## 2023-12-15 VITALS
SYSTOLIC BLOOD PRESSURE: 100 MMHG | DIASTOLIC BLOOD PRESSURE: 70 MMHG | HEART RATE: 74 BPM | OXYGEN SATURATION: 96 % | TEMPERATURE: 98.5 F

## 2023-12-15 PROCEDURE — G0151 HHCP-SERV OF PT,EA 15 MIN: HCPCS

## 2023-12-18 ENCOUNTER — HOME CARE VISIT (OUTPATIENT)
Dept: HOME HEALTH SERVICES | Facility: HOME HEALTHCARE | Age: 63
End: 2023-12-18
Payer: MEDICARE

## 2023-12-18 PROCEDURE — G0152 HHCP-SERV OF OT,EA 15 MIN: HCPCS

## 2023-12-19 VITALS — DIASTOLIC BLOOD PRESSURE: 82 MMHG | OXYGEN SATURATION: 97 % | HEART RATE: 77 BPM | SYSTOLIC BLOOD PRESSURE: 120 MMHG

## 2023-12-19 NOTE — HOME HEALTH
Pt discharged from OT services as pt has met goals.   Pt and therapist in agreement with dc.   Pt to continue with HEP for maintenance.      Pt denies any falls or med changes

## 2023-12-21 ENCOUNTER — HOME CARE VISIT (OUTPATIENT)
Dept: HOME HEALTH SERVICES | Facility: HOME HEALTHCARE | Age: 63
End: 2023-12-21
Payer: MEDICARE

## 2023-12-21 PROCEDURE — G0299 HHS/HOSPICE OF RN EA 15 MIN: HCPCS

## 2024-11-18 ENCOUNTER — OFFICE (AMBULATORY)
Age: 64
End: 2024-11-18
Payer: MEDICARE

## 2024-11-18 ENCOUNTER — OFFICE (AMBULATORY)
Dept: URBAN - METROPOLITAN AREA CLINIC 64 | Facility: CLINIC | Age: 64
End: 2024-11-18
Payer: MEDICARE

## 2024-11-18 VITALS
HEIGHT: 66 IN | WEIGHT: 226 LBS | HEART RATE: 97 BPM | HEIGHT: 66 IN | HEIGHT: 66 IN | HEIGHT: 66 IN | SYSTOLIC BLOOD PRESSURE: 115 MMHG | HEIGHT: 66 IN | HEART RATE: 97 BPM | HEIGHT: 66 IN | HEART RATE: 97 BPM | SYSTOLIC BLOOD PRESSURE: 115 MMHG | WEIGHT: 226 LBS | DIASTOLIC BLOOD PRESSURE: 77 MMHG | SYSTOLIC BLOOD PRESSURE: 115 MMHG | SYSTOLIC BLOOD PRESSURE: 115 MMHG | WEIGHT: 226 LBS | WEIGHT: 226 LBS | HEART RATE: 97 BPM | DIASTOLIC BLOOD PRESSURE: 77 MMHG | DIASTOLIC BLOOD PRESSURE: 77 MMHG | HEIGHT: 66 IN | SYSTOLIC BLOOD PRESSURE: 115 MMHG | DIASTOLIC BLOOD PRESSURE: 77 MMHG | SYSTOLIC BLOOD PRESSURE: 115 MMHG | HEART RATE: 97 BPM | HEART RATE: 97 BPM | SYSTOLIC BLOOD PRESSURE: 115 MMHG | HEART RATE: 97 BPM | DIASTOLIC BLOOD PRESSURE: 77 MMHG | WEIGHT: 226 LBS | DIASTOLIC BLOOD PRESSURE: 77 MMHG | WEIGHT: 226 LBS | DIASTOLIC BLOOD PRESSURE: 77 MMHG | WEIGHT: 226 LBS

## 2024-11-18 DIAGNOSIS — Z86.0101 PERSONAL HISTORY OF ADENOMATOUS AND SERRATED COLON POLYPS: ICD-10-CM

## 2024-11-18 DIAGNOSIS — K59.09 OTHER CONSTIPATION: ICD-10-CM

## 2024-11-18 DIAGNOSIS — R13.10 DYSPHAGIA, UNSPECIFIED: ICD-10-CM

## 2024-11-18 DIAGNOSIS — K62.5 HEMORRHAGE OF ANUS AND RECTUM: ICD-10-CM

## 2024-11-18 DIAGNOSIS — K92.1 MELENA: ICD-10-CM

## 2024-11-18 PROCEDURE — 99204 OFFICE O/P NEW MOD 45 MIN: CPT | Performed by: INTERNAL MEDICINE

## 2025-05-09 ENCOUNTER — EXTERNAL PBMM DATA (OUTPATIENT)
Dept: PHARMACY | Facility: OTHER | Age: 65
End: 2025-05-09
Payer: MEDICAID

## 2025-08-06 ENCOUNTER — HOSPITAL ENCOUNTER (OUTPATIENT)
Dept: CARDIOLOGY | Facility: HOSPITAL | Age: 65
Discharge: HOME | End: 2025-08-06
Payer: MEDICARE

## 2025-08-06 ENCOUNTER — TRANSCRIBE ORDERS (OUTPATIENT)
Dept: ADMINISTRATIVE | Facility: HOSPITAL | Age: 65
End: 2025-08-06
Payer: MEDICARE

## 2025-08-06 ENCOUNTER — LAB (OUTPATIENT)
Dept: LAB | Facility: HOSPITAL | Age: 65
End: 2025-08-06
Payer: MEDICARE

## 2025-08-06 DIAGNOSIS — Z01.818 PRE-OP TESTING: ICD-10-CM

## 2025-08-06 DIAGNOSIS — Z01.818 PRE-OP TESTING: Primary | ICD-10-CM

## 2025-08-06 LAB
ANION GAP SERPL CALCULATED.3IONS-SCNC: 9.7 MMOL/L (ref 5–15)
BASOPHILS # BLD AUTO: 0.06 10*3/MM3 (ref 0–0.2)
BASOPHILS NFR BLD AUTO: 0.8 % (ref 0–1.5)
BUN SERPL-MCNC: 9.1 MG/DL (ref 8–23)
BUN/CREAT SERPL: 12.1 (ref 7–25)
CALCIUM SPEC-SCNC: 8.5 MG/DL (ref 8.6–10.5)
CHLORIDE SERPL-SCNC: 108 MMOL/L (ref 98–107)
CO2 SERPL-SCNC: 25.3 MMOL/L (ref 22–29)
CREAT SERPL-MCNC: 0.75 MG/DL (ref 0.57–1)
DEPRECATED RDW RBC AUTO: 39.4 FL (ref 37–54)
EGFRCR SERPLBLD CKD-EPI 2021: 90.1 ML/MIN/1.73
EOSINOPHIL # BLD AUTO: 0.21 10*3/MM3 (ref 0–0.4)
EOSINOPHIL NFR BLD AUTO: 2.9 % (ref 0.3–6.2)
ERYTHROCYTE [DISTWIDTH] IN BLOOD BY AUTOMATED COUNT: 12.9 % (ref 12.3–15.4)
GLUCOSE SERPL-MCNC: 117 MG/DL (ref 65–99)
HCT VFR BLD AUTO: 46.5 % (ref 34–46.6)
HGB BLD-MCNC: 15.2 G/DL (ref 12–15.9)
IMM GRANULOCYTES # BLD AUTO: 0.03 10*3/MM3 (ref 0–0.05)
IMM GRANULOCYTES NFR BLD AUTO: 0.4 % (ref 0–0.5)
LYMPHOCYTES # BLD AUTO: 1.81 10*3/MM3 (ref 0.7–3.1)
LYMPHOCYTES NFR BLD AUTO: 24.6 % (ref 19.6–45.3)
MCH RBC QN AUTO: 27.4 PG (ref 26.6–33)
MCHC RBC AUTO-ENTMCNC: 32.7 G/DL (ref 31.5–35.7)
MCV RBC AUTO: 83.8 FL (ref 79–97)
MONOCYTES # BLD AUTO: 0.55 10*3/MM3 (ref 0.1–0.9)
MONOCYTES NFR BLD AUTO: 7.5 % (ref 5–12)
NEUTROPHILS NFR BLD AUTO: 4.69 10*3/MM3 (ref 1.7–7)
NEUTROPHILS NFR BLD AUTO: 63.8 % (ref 42.7–76)
NRBC BLD AUTO-RTO: 0 /100 WBC (ref 0–0.2)
PLATELET # BLD AUTO: 266 10*3/MM3 (ref 140–450)
PMV BLD AUTO: 10.8 FL (ref 6–12)
POTASSIUM SERPL-SCNC: 3.9 MMOL/L (ref 3.5–5.2)
QT INTERVAL: 398 MS
QTC INTERVAL: 472 MS
RBC # BLD AUTO: 5.55 10*6/MM3 (ref 3.77–5.28)
SODIUM SERPL-SCNC: 143 MMOL/L (ref 136–145)
WBC NRBC COR # BLD AUTO: 7.35 10*3/MM3 (ref 3.4–10.8)

## 2025-08-06 PROCEDURE — 36415 COLL VENOUS BLD VENIPUNCTURE: CPT

## 2025-08-06 PROCEDURE — 93005 ELECTROCARDIOGRAM TRACING: CPT | Performed by: OTOLARYNGOLOGY

## 2025-08-06 PROCEDURE — 85025 COMPLETE CBC W/AUTO DIFF WBC: CPT

## 2025-08-06 PROCEDURE — 80048 BASIC METABOLIC PNL TOTAL CA: CPT

## (undated) DEVICE — GLV SURG SENSICARE PI ORTHO SZ8 LF STRL

## (undated) DEVICE — IRRIGATOR BULB ASEPTO 60CC STRL

## (undated) DEVICE — SOLUTION,WATER,IRRIGATION,1000ML,STERILE: Brand: MEDLINE

## (undated) DEVICE — PK TOTL KN 50

## (undated) DEVICE — DUAL CUT SAGITTAL BLADE

## (undated) DEVICE — SOL IRR NACL 0.9PCT ARTHROMATIC 3000ML

## (undated) DEVICE — INTEGUSEAL MICROBIAL SEALANT: Brand: AVANOS

## (undated) DEVICE — SPNG CVR STR 2S 4X4

## (undated) DEVICE — ZIP 24 SURGICAL SKIN CLOSURE DEVICE, PSA: Brand: ZIP 24 SURGICAL SKIN CLOSURE DEVICE

## (undated) DEVICE — MICRO SAGITTAL BLADE (9.5 X 0.4 X 25.5MM)

## (undated) DEVICE — ADHS SKIN PREMIERPRO EXOFIN TOPICAL HI/VISC .5ML

## (undated) DEVICE — DECANTER: Brand: UNBRANDED

## (undated) DEVICE — GLV SURG SENSICARE PI LF PF 8 GRN STRL

## (undated) DEVICE — WRAP KNEE COLD THERAPY

## (undated) DEVICE — UNDYED BRAIDED (POLYGLACTIN 910), SYNTHETIC ABSORBABLE SUTURE: Brand: COATED VICRYL

## (undated) DEVICE — STERILE PATIENT PROTECTIVE PAD FOR IMP® KNEE POSITIONERS & COHESIVE WRAP (10 / CASE): Brand: DE MAYO KNEE POSITIONER®

## (undated) DEVICE — SUT VIC 2/0 CT2 27IN J269H

## (undated) DEVICE — GLV SURG SENSICARE PI MIC PF SZ7.5 LF STRL

## (undated) DEVICE — PAD UNDERCAST WYTEX 4IN 4YD LF STRL

## (undated) DEVICE — ZIPPERED TOGA, 2X LARGE: Brand: FLYTE

## (undated) DEVICE — PIN DRL NOHEAD TROC 3.2X75MM

## (undated) DEVICE — DRAPE,C-SECTION,CLR SCREEN,WIRE: Brand: MEDLINE

## (undated) DEVICE — SOL IRRIG NACL 1000ML

## (undated) DEVICE — KT SURG TURNOVER 050

## (undated) DEVICE — PAD UNDERCAST WYTEX 6IN 4YD LF STRL

## (undated) DEVICE — PICO 7 10CM X 30CM: Brand: PICO™ 7

## (undated) DEVICE — NEEDLE, QUINCKE, 18GX3.5": Brand: MEDLINE

## (undated) DEVICE — THIN OSTEOTOME BLADE 8MM X 3 IN: Brand: RENOVATION

## (undated) DEVICE — RECIPROCATING BLADE, DOUBLE SIDED, OFFSET  (70.0 X 0.64 X 12.6MM)

## (undated) DEVICE — CEMENT MIXING SYSTEM WITH FEMORAL BREAKWAY NOZZLE: Brand: REVOLUTION

## (undated) DEVICE — GLV SURG SIGNATURE ESSENTIAL PF LTX SZ8.5

## (undated) DEVICE — UNDERGLV SURG BIOGEL INDICAT PI SZ8 BLU

## (undated) DEVICE — CUFF TOURNI 1BLADDER 1PRT 30IN STRL